# Patient Record
Sex: MALE | Race: BLACK OR AFRICAN AMERICAN | NOT HISPANIC OR LATINO | Employment: UNEMPLOYED | URBAN - METROPOLITAN AREA
[De-identification: names, ages, dates, MRNs, and addresses within clinical notes are randomized per-mention and may not be internally consistent; named-entity substitution may affect disease eponyms.]

---

## 2017-08-20 ENCOUNTER — HOSPITAL ENCOUNTER (EMERGENCY)
Facility: OTHER | Age: 82
Discharge: HOME | End: 2017-08-20
Attending: EMERGENCY MEDICINE
Payer: MEDICARE

## 2017-08-20 VITALS
OXYGEN SATURATION: 100 % | WEIGHT: 191 LBS | HEART RATE: 54 BPM | RESPIRATION RATE: 16 BRPM | TEMPERATURE: 99 F | HEIGHT: 67 IN | SYSTOLIC BLOOD PRESSURE: 157 MMHG | DIASTOLIC BLOOD PRESSURE: 70 MMHG | BODY MASS INDEX: 29.98 KG/M2

## 2017-08-20 DIAGNOSIS — I10 ESSENTIAL HYPERTENSION: ICD-10-CM

## 2017-08-20 DIAGNOSIS — K40.90 REDUCIBLE RIGHT INGUINAL HERNIA: Primary | ICD-10-CM

## 2017-08-20 PROCEDURE — 99282 EMERGENCY DEPT VISIT SF MDM: CPT

## 2017-08-20 RX ORDER — LISINOPRIL 20 MG/1
40 TABLET ORAL DAILY
Status: ON HOLD | COMMUNITY
End: 2017-09-02 | Stop reason: HOSPADM

## 2017-08-20 RX ORDER — CIPROFLOXACIN 500 MG/1
500 TABLET ORAL 2 TIMES DAILY
Status: ON HOLD | COMMUNITY
End: 2017-09-02

## 2017-08-20 RX ORDER — ERGOCALCIFEROL 1.25 MG/1
50000 CAPSULE ORAL
COMMUNITY

## 2017-08-20 NOTE — ED TRIAGE NOTES
"EMS reports pt has a history of hernia; pt reported to EMS and staff that he lifted up on a case of water Monday; pt reports today is the "first day" that he noticed some swelling and pain to right groin area; denies any dysuria/hematuria; denies any falls/injuries to area  "

## 2017-08-20 NOTE — ED NOTES
"Pt reports "I really don't have any pain right now after they gave me that shot." EMS gave 100 mcg of Fentanyl about 30 minutes PTA  "

## 2017-08-20 NOTE — ED NOTES
Pt waiting for family member to take home; pt resting comfortably with no s/s of distress; respirations regular, even and unlabored; call light within reach; continue to monitor for any changes

## 2017-08-20 NOTE — ED PROVIDER NOTES
"Encounter Date: 8/20/2017    SCRIBE #1 NOTE: I, Steve Ritchie, am scribing for, and in the presence of, Dr. El.       History     Chief Complaint   Patient presents with    Groin Pain     Right sided groin pain with swelling, and tenderness.     10:56 AM    Patient is an 83 year old male who presents to the ED with complaint of right sided groin pain. Patient has a known right groin hernia for four or five months. He reports that he stood up this morning and felt a sudden pain, stating "all at once felt something happen down there." Pain has subsided upon arrival to ED, and he is not currently in pain. He states he can "usually massage it away, but not this morning."      The history is provided by the patient.     Review of patient's allergies indicates:   Allergen Reactions    Penicillins Rash     Past Medical History:   Diagnosis Date    Anticoagulant long-term use     Coronary artery disease     GERD (gastroesophageal reflux disease)     Hypertension      Past Surgical History:   Procedure Laterality Date    HERNIA REPAIR      stents       History reviewed. No pertinent family history.  Social History   Substance Use Topics    Smoking status: Former Smoker     Quit date: 12/26/1995    Smokeless tobacco: Former User      Comment: pt reports stopped smoking 30 years ago    Alcohol use No     Review of Systems   Constitutional: Negative for fever.   HENT: Negative for sore throat.    Respiratory: Negative for shortness of breath.    Cardiovascular: Negative for chest pain.   Gastrointestinal:        Positive for right groin hernia.   Genitourinary: Negative for dysuria.   Musculoskeletal: Negative for back pain.   Skin: Negative for rash.   Neurological: Negative for weakness.   Hematological: Does not bruise/bleed easily.       Physical Exam     Initial Vitals [08/20/17 1021]   BP Pulse Resp Temp SpO2   (!) 140/65 (!) 58 16 98.7 °F (37.1 °C) 99 %      MAP       90         Physical Exam    Nursing " note and vitals reviewed.  Constitutional: He appears well-developed and well-nourished. He is not diaphoretic. No distress.   HENT:   Head: Normocephalic and atraumatic.   Mouth/Throat: Oropharynx is clear and moist.   Eyes: Conjunctivae and EOM are normal. Pupils are equal, round, and reactive to light.   Conjunctiva are pink, clear, and intact.   Neck: Normal range of motion. Neck supple.   Cardiovascular: Normal rate, regular rhythm and normal heart sounds. Exam reveals no gallop and no friction rub.    No murmur heard.  Pulmonary/Chest: Breath sounds normal. No respiratory distress. He has no wheezes. He has no rhonchi. He has no rales.   Abdominal: Soft. He exhibits no distension.   Reducible right groin hernia. No tenderness.   Musculoskeletal: Normal range of motion.   Neurological: He is alert and oriented to person, place, and time. He has normal strength.   Skin: Skin is warm and dry. No abscess noted. No erythema. No pallor.   Psychiatric: He has a normal mood and affect. His behavior is normal. Judgment and thought content normal.         ED Course   Procedures  Labs Reviewed - No data to display                      Scribe Attestation:   Scribe #1: I performed the above scribed service and the documentation accurately describes the services I performed. I attest to the accuracy of the note.    Attending Attestation:           Physician Attestation for Scribe:  Physician Attestation Statement for Scribe #1: I, Dr. El, reviewed documentation, as scribed by Steve Ritchie in my presence, and it is both accurate and complete.         Attending ED Notes:   Urgent evaluation of 83-year-old male with past medical history of inguinal hernia presents to ED with chief complaint of right-sided groin pain and bulging.  Patient is afebrile, nontoxic-appearing with stable vital signs except for elevation in blood pressure.  Patient is found to have a easily reducible right inguinal hernia.  No clinical  evidence of incarceration or strangulation.  The patient is extensive the counseled on his diagnosis and treatment, discharged in good condition and directed follow-up with general surgery in the next 24-48 hours.          ED Course     Clinical Impression:     1. Reducible right inguinal hernia    2. Essential hypertension                                 Edward Neal MD  08/20/17 0798

## 2017-08-28 ENCOUNTER — HOSPITAL ENCOUNTER (INPATIENT)
Facility: HOSPITAL | Age: 82
LOS: 5 days | Discharge: HOME | DRG: 871 | End: 2017-09-02
Attending: EMERGENCY MEDICINE | Admitting: INTERNAL MEDICINE
Payer: MEDICARE

## 2017-08-28 DIAGNOSIS — K80.50 CHOLEDOCHOLITHIASIS: ICD-10-CM

## 2017-08-28 DIAGNOSIS — K85.10 ACUTE BILIARY PANCREATITIS, UNSPECIFIED COMPLICATION STATUS: Primary | ICD-10-CM

## 2017-08-28 DIAGNOSIS — K85.10 GALLSTONE PANCREATITIS: ICD-10-CM

## 2017-08-28 LAB
ALBUMIN SERPL BCP-MCNC: 2.4 G/DL
ALP SERPL-CCNC: 397 U/L
ALT SERPL W/O P-5'-P-CCNC: 173 U/L
ANION GAP SERPL CALC-SCNC: 10 MMOL/L
AST SERPL-CCNC: 231 U/L
BACTERIA #/AREA URNS HPF: NORMAL /HPF
BASOPHILS # BLD AUTO: 0.01 K/UL
BASOPHILS NFR BLD: 0.1 %
BILIRUB SERPL-MCNC: 2.7 MG/DL
BILIRUB UR QL STRIP: NEGATIVE
BUN SERPL-MCNC: 22 MG/DL
CALCIUM SERPL-MCNC: 9.1 MG/DL
CHLORIDE SERPL-SCNC: 108 MMOL/L
CLARITY UR: CLEAR
CO2 SERPL-SCNC: 20 MMOL/L
COLOR UR: YELLOW
CREAT SERPL-MCNC: 2 MG/DL
DIFFERENTIAL METHOD: ABNORMAL
EOSINOPHIL # BLD AUTO: 0 K/UL
EOSINOPHIL NFR BLD: 0.1 %
ERYTHROCYTE [DISTWIDTH] IN BLOOD BY AUTOMATED COUNT: 14.1 %
EST. GFR  (AFRICAN AMERICAN): 35 ML/MIN/1.73 M^2
EST. GFR  (NON AFRICAN AMERICAN): 30 ML/MIN/1.73 M^2
GLUCOSE SERPL-MCNC: 128 MG/DL
GLUCOSE UR QL STRIP: NEGATIVE
HCT VFR BLD AUTO: 37.8 %
HGB BLD-MCNC: 12.9 G/DL
HGB UR QL STRIP: ABNORMAL
INR PPP: 1.1
KETONES UR QL STRIP: NEGATIVE
LACTATE SERPL-SCNC: 1.2 MMOL/L
LDH SERPL L TO P-CCNC: 416 U/L
LEUKOCYTE ESTERASE UR QL STRIP: NEGATIVE
LIPASE SERPL-CCNC: >1000 U/L
LYMPHOCYTES # BLD AUTO: 0.5 K/UL
LYMPHOCYTES NFR BLD: 2.6 %
MCH RBC QN AUTO: 30.9 PG
MCHC RBC AUTO-ENTMCNC: 34.1 G/DL
MCV RBC AUTO: 91 FL
MICROSCOPIC COMMENT: NORMAL
MONOCYTES # BLD AUTO: 0.7 K/UL
MONOCYTES NFR BLD: 4 %
NEUTROPHILS # BLD AUTO: 16.9 K/UL
NEUTROPHILS NFR BLD: 93.2 %
NITRITE UR QL STRIP: NEGATIVE
PH UR STRIP: 5 [PH] (ref 5–8)
PLATELET # BLD AUTO: 425 K/UL
PMV BLD AUTO: 10.7 FL
POTASSIUM SERPL-SCNC: 4.4 MMOL/L
PROT SERPL-MCNC: 6.3 G/DL
PROT UR QL STRIP: NEGATIVE
PROTHROMBIN TIME: 11.4 SEC
RBC # BLD AUTO: 4.17 M/UL
RBC #/AREA URNS HPF: 2 /HPF (ref 0–4)
SODIUM SERPL-SCNC: 138 MMOL/L
SP GR UR STRIP: 1.01 (ref 1–1.03)
SQUAMOUS #/AREA URNS HPF: 2 /HPF
URN SPEC COLLECT METH UR: ABNORMAL
UROBILINOGEN UR STRIP-ACNC: ABNORMAL EU/DL
WBC # BLD AUTO: 18.18 K/UL
WBC #/AREA URNS HPF: 1 /HPF (ref 0–5)

## 2017-08-28 PROCEDURE — 83690 ASSAY OF LIPASE: CPT

## 2017-08-28 PROCEDURE — 83615 LACTATE (LD) (LDH) ENZYME: CPT

## 2017-08-28 PROCEDURE — 99285 EMERGENCY DEPT VISIT HI MDM: CPT | Mod: 25

## 2017-08-28 PROCEDURE — 81000 URINALYSIS NONAUTO W/SCOPE: CPT

## 2017-08-28 PROCEDURE — 63600175 PHARM REV CODE 636 W HCPCS: Performed by: EMERGENCY MEDICINE

## 2017-08-28 PROCEDURE — 96367 TX/PROPH/DG ADDL SEQ IV INF: CPT

## 2017-08-28 PROCEDURE — 87040 BLOOD CULTURE FOR BACTERIA: CPT | Mod: 59

## 2017-08-28 PROCEDURE — 87086 URINE CULTURE/COLONY COUNT: CPT

## 2017-08-28 PROCEDURE — 96365 THER/PROPH/DIAG IV INF INIT: CPT

## 2017-08-28 PROCEDURE — 25000003 PHARM REV CODE 250: Performed by: EMERGENCY MEDICINE

## 2017-08-28 PROCEDURE — 87186 SC STD MICRODIL/AGAR DIL: CPT

## 2017-08-28 PROCEDURE — 96366 THER/PROPH/DIAG IV INF ADDON: CPT

## 2017-08-28 PROCEDURE — 85610 PROTHROMBIN TIME: CPT

## 2017-08-28 PROCEDURE — 86900 BLOOD TYPING SEROLOGIC ABO: CPT

## 2017-08-28 PROCEDURE — 86850 RBC ANTIBODY SCREEN: CPT

## 2017-08-28 PROCEDURE — 80053 COMPREHEN METABOLIC PANEL: CPT

## 2017-08-28 PROCEDURE — 83605 ASSAY OF LACTIC ACID: CPT

## 2017-08-28 PROCEDURE — 12000002 HC ACUTE/MED SURGE SEMI-PRIVATE ROOM

## 2017-08-28 PROCEDURE — 87077 CULTURE AEROBIC IDENTIFY: CPT

## 2017-08-28 PROCEDURE — 96375 TX/PRO/DX INJ NEW DRUG ADDON: CPT

## 2017-08-28 PROCEDURE — 85025 COMPLETE CBC W/AUTO DIFF WBC: CPT

## 2017-08-28 RX ORDER — ONDANSETRON 2 MG/ML
4 INJECTION INTRAMUSCULAR; INTRAVENOUS EVERY 12 HOURS PRN
Status: DISCONTINUED | OUTPATIENT
Start: 2017-08-28 | End: 2017-09-02 | Stop reason: HOSPADM

## 2017-08-28 RX ORDER — METRONIDAZOLE 500 MG/100ML
500 INJECTION, SOLUTION INTRAVENOUS
Status: DISCONTINUED | OUTPATIENT
Start: 2017-08-28 | End: 2017-08-29

## 2017-08-28 RX ORDER — MORPHINE SULFATE 10 MG/ML
6 INJECTION INTRAMUSCULAR; INTRAVENOUS; SUBCUTANEOUS
Status: COMPLETED | OUTPATIENT
Start: 2017-08-28 | End: 2017-08-28

## 2017-08-28 RX ORDER — CIPROFLOXACIN 2 MG/ML
400 INJECTION, SOLUTION INTRAVENOUS
Status: DISCONTINUED | OUTPATIENT
Start: 2017-08-29 | End: 2017-08-29

## 2017-08-28 RX ORDER — ONDANSETRON 2 MG/ML
4 INJECTION INTRAMUSCULAR; INTRAVENOUS
Status: COMPLETED | OUTPATIENT
Start: 2017-08-28 | End: 2017-08-28

## 2017-08-28 RX ORDER — CIPROFLOXACIN 2 MG/ML
400 INJECTION, SOLUTION INTRAVENOUS
Status: COMPLETED | OUTPATIENT
Start: 2017-08-28 | End: 2017-08-28

## 2017-08-28 RX ORDER — HYDROMORPHONE HYDROCHLORIDE 2 MG/ML
1 INJECTION, SOLUTION INTRAMUSCULAR; INTRAVENOUS; SUBCUTANEOUS
Status: COMPLETED | OUTPATIENT
Start: 2017-08-28 | End: 2017-08-28

## 2017-08-28 RX ORDER — FAMOTIDINE 10 MG/ML
20 INJECTION INTRAVENOUS DAILY
Status: DISCONTINUED | OUTPATIENT
Start: 2017-08-29 | End: 2017-09-02 | Stop reason: HOSPADM

## 2017-08-28 RX ORDER — SODIUM CHLORIDE 9 MG/ML
1000 INJECTION, SOLUTION INTRAVENOUS
Status: COMPLETED | OUTPATIENT
Start: 2017-08-28 | End: 2017-08-28

## 2017-08-28 RX ORDER — HYDROMORPHONE HYDROCHLORIDE 2 MG/ML
1 INJECTION, SOLUTION INTRAMUSCULAR; INTRAVENOUS; SUBCUTANEOUS EVERY 4 HOURS PRN
Status: DISCONTINUED | OUTPATIENT
Start: 2017-08-28 | End: 2017-09-02 | Stop reason: HOSPADM

## 2017-08-28 RX ORDER — MEROPENEM AND SODIUM CHLORIDE 500 MG/50ML
500 INJECTION, SOLUTION INTRAVENOUS
Status: DISCONTINUED | OUTPATIENT
Start: 2017-08-29 | End: 2017-08-29

## 2017-08-28 RX ORDER — SODIUM CHLORIDE 9 MG/ML
INJECTION, SOLUTION INTRAVENOUS CONTINUOUS
Status: DISCONTINUED | OUTPATIENT
Start: 2017-08-29 | End: 2017-09-02 | Stop reason: HOSPADM

## 2017-08-28 RX ORDER — MORPHINE SULFATE 10 MG/ML
2 INJECTION INTRAMUSCULAR; INTRAVENOUS; SUBCUTANEOUS EVERY 4 HOURS PRN
Status: DISCONTINUED | OUTPATIENT
Start: 2017-08-28 | End: 2017-09-02 | Stop reason: HOSPADM

## 2017-08-28 RX ORDER — ACETAMINOPHEN 325 MG/1
650 TABLET ORAL EVERY 8 HOURS PRN
Status: DISCONTINUED | OUTPATIENT
Start: 2017-08-28 | End: 2017-09-02 | Stop reason: HOSPADM

## 2017-08-28 RX ORDER — ACETAMINOPHEN 500 MG
1000 TABLET ORAL
Status: COMPLETED | OUTPATIENT
Start: 2017-08-28 | End: 2017-08-28

## 2017-08-28 RX ADMIN — MORPHINE SULFATE 6 MG: 10 INJECTION INTRAVENOUS at 06:08

## 2017-08-28 RX ADMIN — HYDROMORPHONE HYDROCHLORIDE 1 MG: 2 INJECTION INTRAMUSCULAR; INTRAVENOUS; SUBCUTANEOUS at 08:08

## 2017-08-28 RX ADMIN — ACETAMINOPHEN 1000 MG: 500 TABLET ORAL at 08:08

## 2017-08-28 RX ADMIN — SODIUM CHLORIDE 1000 ML: 0.9 INJECTION, SOLUTION INTRAVENOUS at 10:08

## 2017-08-28 RX ADMIN — ONDANSETRON 4 MG: 2 INJECTION INTRAMUSCULAR; INTRAVENOUS at 06:08

## 2017-08-28 RX ADMIN — CIPROFLOXACIN 400 MG: 2 INJECTION, SOLUTION INTRAVENOUS at 10:08

## 2017-08-28 RX ADMIN — SODIUM CHLORIDE 1000 ML: 0.9 INJECTION, SOLUTION INTRAVENOUS at 06:08

## 2017-08-28 NOTE — ED PROVIDER NOTES
Encounter Date: 8/28/2017    SCRIBE #1 NOTE: I, Smiley Le, am scribing for, and in the presence of,  Heath Byrnes MD. I have scribed the following portions of the note - Other sections scribed: HPI and ROS.       History     Chief Complaint   Patient presents with    Abdominal Pain     Pt reports increasing abdominal pain today and dificulty urinating. Pt also scheduled for hernia repair today but could not make it to surgery. Temp on arrival 103.3     CC: Abdominal Pain    HPI: The pt is a 83 y.o. M with a PMHx of anticoagulant long-term use, hernia, coronary artery disease, GERD, and HTN and a PSHx of leg stents and hernia repair who presents to the ED c/o constant and exacerbating periumbilical abdominal pain that started yesterday. Pt states that he was schedule for a hernia repair surgery today but could not make it. Pt rates pain as severe (10/10), states that it radiates from periumbilical region to sides and around back, and says that pain exacerbates with movement,. Pt also reports associated nausea and emesis. Pt says that when he vomits, nothing comes up. Pt reports constipation and diarrhea that started 4 days ago as well as dysuria. Pt denies EtOH consumption. No attempted treatments. No alleviating factors. Pt otherwise denies fever, chest pain, penile swelling, and other associated symptoms.         The history is provided by the patient. No  was used.     Review of patient's allergies indicates:   Allergen Reactions    Penicillins Rash     Past Medical History:   Diagnosis Date    Anticoagulant long-term use     Coronary artery disease     GERD (gastroesophageal reflux disease)     Hypertension      Past Surgical History:   Procedure Laterality Date    HERNIA REPAIR      stents       History reviewed. No pertinent family history.  Social History   Substance Use Topics    Smoking status: Former Smoker     Quit date: 12/26/1995    Smokeless tobacco: Former User       Comment: pt reports stopped smoking 30 years ago    Alcohol use No     Review of Systems   Constitutional: Negative for chills, diaphoresis and fever.   HENT: Negative for ear pain and sore throat.    Eyes: Negative for redness.   Respiratory: Negative for cough and shortness of breath.    Cardiovascular: Negative for chest pain.   Gastrointestinal: Positive for abdominal pain (perimubilical radiating to sides and back), constipation, diarrhea, nausea and vomiting.   Genitourinary: Positive for dysuria. Negative for penile swelling.   Musculoskeletal: Negative for back pain.   Skin: Negative for rash.   Neurological: Negative for headaches.       Physical Exam     Initial Vitals [08/28/17 1755]   BP Pulse Resp Temp SpO2   (!) 140/73 70 18 (!) 103.3 °F (39.6 °C) 97 %      MAP       95.33         Physical Exam    Nursing note and vitals reviewed.  Constitutional: He appears well-developed and well-nourished. He appears distressed.   HENT:   Head: Atraumatic.   Eyes: EOM are normal. Pupils are equal, round, and reactive to light.   Neck: Normal range of motion. Neck supple. No JVD present.   Cardiovascular: Normal rate, regular rhythm, normal heart sounds and intact distal pulses. Exam reveals no gallop and no friction rub.    No murmur heard.  Pulmonary/Chest: Breath sounds normal. No respiratory distress. He has no wheezes. He has no rhonchi. He has no rales.   Abdominal: Soft. Bowel sounds are normal. There is tenderness.   Epigastric right upper quadrant and left upper quadrant tenderness.  However patient states all his pain is in the lower abdomen   Musculoskeletal: Normal range of motion.   Lymphadenopathy:     He has no cervical adenopathy.   Neurological: He is alert and oriented to person, place, and time. He has normal strength.   Skin: Skin is warm and dry.   Psychiatric: He has a normal mood and affect. Thought content normal.         ED Course   Procedures  Labs Reviewed   CBC W/ AUTO DIFFERENTIAL -  Abnormal; Notable for the following:        Result Value    WBC 18.18 (*)     RBC 4.17 (*)     Hemoglobin 12.9 (*)     Hematocrit 37.8 (*)     Platelets 425 (*)     Gran # 16.9 (*)     Lymph # 0.5 (*)     Gran% 93.2 (*)     Lymph% 2.6 (*)     All other components within normal limits   COMPREHENSIVE METABOLIC PANEL - Abnormal; Notable for the following:     CO2 20 (*)     Glucose 128 (*)     Creatinine 2.0 (*)     Albumin 2.4 (*)     Total Bilirubin 2.7 (*)     Alkaline Phosphatase 397 (*)      (*)      (*)     eGFR if  35 (*)     eGFR if non  30 (*)     All other components within normal limits    Narrative:     Recoll. [id number] by KTN at 08/28/2017 19:39, reason: SPECIMEN   GROSSLY HEMOLYZED. NOTIFIED ELISSA   LIPASE - Abnormal; Notable for the following:     Lipase >1000 (*)     All other components within normal limits    Narrative:     Recoll. [id number] by KTN at 08/28/2017 19:39, reason: SPECIMEN   GROSSLY HEMOLYZED. NOTIFIED ELISSA   URINALYSIS - Abnormal; Notable for the following:     Occult Blood UA 1+ (*)     Urobilinogen, UA 4.0-6.0 (*)     All other components within normal limits   LACTATE DEHYDROGENASE - Abnormal; Notable for the following:      (*)     All other components within normal limits   CULTURE, URINE   LACTIC ACID, PLASMA   URINALYSIS MICROSCOPIC   PROTIME-INR   TYPE & SCREEN        Lab work consistent with choledocholithiasis and gallstone pancreatitis  Patient also had fever and leukocytosis concerning for cholecystitis or ascending cholangitis.  Imaging confirms these diagnoses.  I discussed the case with Dr. Lund.    We discussed patient's age and comorbidities and potential for worsening clinical outcome. Dr. Lund instructs me to place the patient on MedSurg.  He also requests Invanz as antibiotic.  Will place consult for gastroenterology for ERCP.                Scribe Attestation:   Scribe #1: I performed the above scribed  service and the documentation accurately describes the services I performed. I attest to the accuracy of the note.    Attending Attestation:           Physician Attestation for Scribe:  Physician Attestation Statement for Scribe #1: I, Heath Byrnes MD, reviewed documentation, as scribed by Smiley Hancock in my presence, and it is both accurate and complete.                 ED Course     Clinical Impression:   The primary encounter diagnosis was Choledocholithiasis. A diagnosis of Gallstone pancreatitis was also pertinent to this visit.                           Heath Byrnes MD  08/29/17 0336

## 2017-08-28 NOTE — ED TRIAGE NOTES
"Pt presents to the emergency department complaining of lower abdominal pain that has been off and on for two weeks. Associated with nausea. States he has had no vomiting or diarrhea, and that "nothing will come up". Doesn't know when his last bowel movement was. Denies chest pain or headache.   "

## 2017-08-29 ENCOUNTER — ANESTHESIA (OUTPATIENT)
Dept: ENDOSCOPY | Facility: HOSPITAL | Age: 82
DRG: 871 | End: 2017-08-29
Payer: MEDICARE

## 2017-08-29 ENCOUNTER — ANESTHESIA EVENT (OUTPATIENT)
Dept: ENDOSCOPY | Facility: HOSPITAL | Age: 82
DRG: 871 | End: 2017-08-29
Payer: MEDICARE

## 2017-08-29 PROBLEM — R65.20 SEVERE SEPSIS: Status: ACTIVE | Noted: 2017-08-29

## 2017-08-29 PROBLEM — A41.9 SEVERE SEPSIS: Status: ACTIVE | Noted: 2017-08-29

## 2017-08-29 PROBLEM — I25.10 CAD (CORONARY ARTERY DISEASE): Status: ACTIVE | Noted: 2017-08-29

## 2017-08-29 PROBLEM — R78.81 GRAM-NEGATIVE BACTEREMIA: Status: ACTIVE | Noted: 2017-08-29

## 2017-08-29 PROBLEM — K85.90 ACUTE PANCREATITIS: Status: ACTIVE | Noted: 2017-08-29

## 2017-08-29 PROBLEM — I25.10 CAD (CORONARY ARTERY DISEASE): Chronic | Status: ACTIVE | Noted: 2017-08-29

## 2017-08-29 PROBLEM — I73.9 PAD (PERIPHERAL ARTERY DISEASE): Chronic | Status: ACTIVE | Noted: 2017-08-29

## 2017-08-29 PROBLEM — N17.9 ACUTE RENAL FAILURE: Status: ACTIVE | Noted: 2017-08-29

## 2017-08-29 PROBLEM — I10 ESSENTIAL HYPERTENSION: Chronic | Status: ACTIVE | Noted: 2017-08-29

## 2017-08-29 PROBLEM — K40.90 UNILATERAL INGUINAL HERNIA: Chronic | Status: ACTIVE | Noted: 2017-08-29

## 2017-08-29 LAB
ABO + RH BLD: NORMAL
ALBUMIN SERPL BCP-MCNC: 2.4 G/DL
ALP SERPL-CCNC: 389 U/L
ALT SERPL W/O P-5'-P-CCNC: 241 U/L
ANION GAP SERPL CALC-SCNC: 10 MMOL/L
AST SERPL-CCNC: 285 U/L
BASOPHILS # BLD AUTO: 0.01 K/UL
BASOPHILS NFR BLD: 0 %
BILIRUB SERPL-MCNC: 3.7 MG/DL
BLD GP AB SCN CELLS X3 SERPL QL: NORMAL
BUN SERPL-MCNC: 22 MG/DL
CALCIUM SERPL-MCNC: 9.7 MG/DL
CHLORIDE SERPL-SCNC: 107 MMOL/L
CO2 SERPL-SCNC: 22 MMOL/L
CREAT SERPL-MCNC: 2 MG/DL
DIFFERENTIAL METHOD: ABNORMAL
EOSINOPHIL # BLD AUTO: 0 K/UL
EOSINOPHIL NFR BLD: 0 %
ERYTHROCYTE [DISTWIDTH] IN BLOOD BY AUTOMATED COUNT: 14.2 %
EST. GFR  (AFRICAN AMERICAN): 35 ML/MIN/1.73 M^2
EST. GFR  (NON AFRICAN AMERICAN): 30 ML/MIN/1.73 M^2
GLUCOSE SERPL-MCNC: 99 MG/DL
HCT VFR BLD AUTO: 35.2 %
HGB BLD-MCNC: 11.8 G/DL
LYMPHOCYTES # BLD AUTO: 1.2 K/UL
LYMPHOCYTES NFR BLD: 5.3 %
MCH RBC QN AUTO: 30.6 PG
MCHC RBC AUTO-ENTMCNC: 33.5 G/DL
MCV RBC AUTO: 91 FL
MONOCYTES # BLD AUTO: 1.2 K/UL
MONOCYTES NFR BLD: 5.3 %
NEUTROPHILS # BLD AUTO: 19.7 K/UL
NEUTROPHILS NFR BLD: 88.9 %
PLATELET # BLD AUTO: 390 K/UL
PMV BLD AUTO: 10.8 FL
POTASSIUM SERPL-SCNC: 4.8 MMOL/L
PROT SERPL-MCNC: 6.4 G/DL
RBC # BLD AUTO: 3.85 M/UL
SODIUM SERPL-SCNC: 139 MMOL/L
WBC # BLD AUTO: 22.18 K/UL

## 2017-08-29 PROCEDURE — 43274 ERCP DUCT STENT PLACEMENT: CPT | Performed by: INTERNAL MEDICINE

## 2017-08-29 PROCEDURE — 25000242 PHARM REV CODE 250 ALT 637 W/ HCPCS: Performed by: ANESTHESIOLOGY

## 2017-08-29 PROCEDURE — 25000003 PHARM REV CODE 250: Performed by: ANESTHESIOLOGY

## 2017-08-29 PROCEDURE — 25000003 PHARM REV CODE 250: Performed by: INTERNAL MEDICINE

## 2017-08-29 PROCEDURE — 12000002 HC ACUTE/MED SURGE SEMI-PRIVATE ROOM

## 2017-08-29 PROCEDURE — 63600175 PHARM REV CODE 636 W HCPCS: Performed by: NURSE ANESTHETIST, CERTIFIED REGISTERED

## 2017-08-29 PROCEDURE — 37000008 HC ANESTHESIA 1ST 15 MINUTES: Performed by: INTERNAL MEDICINE

## 2017-08-29 PROCEDURE — 27000221 HC OXYGEN, UP TO 24 HOURS

## 2017-08-29 PROCEDURE — S0030 INJECTION, METRONIDAZOLE: HCPCS | Performed by: EMERGENCY MEDICINE

## 2017-08-29 PROCEDURE — 94660 CPAP INITIATION&MGMT: CPT

## 2017-08-29 PROCEDURE — D9220A PRA ANESTHESIA: Mod: CRNA,,, | Performed by: NURSE ANESTHETIST, CERTIFIED REGISTERED

## 2017-08-29 PROCEDURE — 63600175 PHARM REV CODE 636 W HCPCS: Performed by: INTERNAL MEDICINE

## 2017-08-29 PROCEDURE — 99900035 HC TECH TIME PER 15 MIN (STAT)

## 2017-08-29 PROCEDURE — 37000009 HC ANESTHESIA EA ADD 15 MINS: Performed by: INTERNAL MEDICINE

## 2017-08-29 PROCEDURE — D9220A PRA ANESTHESIA: Mod: ANES,,, | Performed by: ANESTHESIOLOGY

## 2017-08-29 PROCEDURE — 27800788 HC STENT, BILIARY T.T.S. 4-8 NEW: Performed by: INTERNAL MEDICINE

## 2017-08-29 PROCEDURE — 36415 COLL VENOUS BLD VENIPUNCTURE: CPT

## 2017-08-29 PROCEDURE — 80053 COMPREHEN METABOLIC PANEL: CPT

## 2017-08-29 PROCEDURE — 63600175 PHARM REV CODE 636 W HCPCS: Performed by: EMERGENCY MEDICINE

## 2017-08-29 PROCEDURE — 25000003 PHARM REV CODE 250: Performed by: NURSE ANESTHETIST, CERTIFIED REGISTERED

## 2017-08-29 PROCEDURE — 27200949 HC CANNULATOME: Performed by: INTERNAL MEDICINE

## 2017-08-29 PROCEDURE — 25000003 PHARM REV CODE 250: Performed by: EMERGENCY MEDICINE

## 2017-08-29 PROCEDURE — S0028 INJECTION, FAMOTIDINE, 20 MG: HCPCS | Performed by: EMERGENCY MEDICINE

## 2017-08-29 PROCEDURE — 85025 COMPLETE CBC W/AUTO DIFF WBC: CPT

## 2017-08-29 PROCEDURE — 94761 N-INVAS EAR/PLS OXIMETRY MLT: CPT

## 2017-08-29 PROCEDURE — 94640 AIRWAY INHALATION TREATMENT: CPT

## 2017-08-29 PROCEDURE — 0F798DZ DILATION OF COMMON BILE DUCT WITH INTRALUMINAL DEVICE, VIA NATURAL OR ARTIFICIAL OPENING ENDOSCOPIC: ICD-10-PCS | Performed by: INTERNAL MEDICINE

## 2017-08-29 PROCEDURE — 27000190 HC CPAP FULL FACE MASK W/VALVE

## 2017-08-29 RX ORDER — NEOSTIGMINE METHYLSULFATE 1 MG/ML
INJECTION, SOLUTION INTRAVENOUS
Status: DISCONTINUED | OUTPATIENT
Start: 2017-08-29 | End: 2017-08-29

## 2017-08-29 RX ORDER — LABETALOL HYDROCHLORIDE 5 MG/ML
INJECTION, SOLUTION INTRAVENOUS
Status: DISPENSED
Start: 2017-08-29 | End: 2017-08-30

## 2017-08-29 RX ORDER — LIDOCAINE HCL/PF 100 MG/5ML
SYRINGE (ML) INTRAVENOUS
Status: DISCONTINUED | OUTPATIENT
Start: 2017-08-29 | End: 2017-08-29

## 2017-08-29 RX ORDER — HYDRALAZINE HYDROCHLORIDE 20 MG/ML
INJECTION INTRAMUSCULAR; INTRAVENOUS
Status: DISPENSED
Start: 2017-08-29 | End: 2017-08-30

## 2017-08-29 RX ORDER — INDOMETHACIN 50 MG/1
100 SUPPOSITORY RECTAL ONCE
Status: DISCONTINUED | OUTPATIENT
Start: 2017-08-29 | End: 2017-08-29

## 2017-08-29 RX ORDER — SODIUM CHLORIDE, SODIUM LACTATE, POTASSIUM CHLORIDE, CALCIUM CHLORIDE 600; 310; 30; 20 MG/100ML; MG/100ML; MG/100ML; MG/100ML
INJECTION, SOLUTION INTRAVENOUS CONTINUOUS
Status: DISCONTINUED | OUTPATIENT
Start: 2017-08-29 | End: 2017-08-29

## 2017-08-29 RX ORDER — ROCURONIUM BROMIDE 10 MG/ML
INJECTION, SOLUTION INTRAVENOUS
Status: DISCONTINUED | OUTPATIENT
Start: 2017-08-29 | End: 2017-08-29

## 2017-08-29 RX ORDER — INDOMETHACIN 50 MG/1
SUPPOSITORY RECTAL
Status: DISCONTINUED | OUTPATIENT
Start: 2017-08-29 | End: 2017-08-29

## 2017-08-29 RX ORDER — LABETALOL HYDROCHLORIDE 5 MG/ML
INJECTION, SOLUTION INTRAVENOUS
Status: DISCONTINUED | OUTPATIENT
Start: 2017-08-29 | End: 2017-08-29

## 2017-08-29 RX ORDER — GLUCAGON 1 MG
KIT INJECTION
Status: DISCONTINUED
Start: 2017-08-29 | End: 2017-08-29 | Stop reason: WASHOUT

## 2017-08-29 RX ORDER — PROPOFOL 10 MG/ML
VIAL (ML) INTRAVENOUS
Status: DISCONTINUED | OUTPATIENT
Start: 2017-08-29 | End: 2017-08-29

## 2017-08-29 RX ORDER — FENTANYL CITRATE 50 UG/ML
INJECTION, SOLUTION INTRAMUSCULAR; INTRAVENOUS
Status: DISCONTINUED | OUTPATIENT
Start: 2017-08-29 | End: 2017-08-29

## 2017-08-29 RX ORDER — METOPROLOL TARTRATE 1 MG/ML
INJECTION, SOLUTION INTRAVENOUS
Status: DISCONTINUED | OUTPATIENT
Start: 2017-08-29 | End: 2017-08-29

## 2017-08-29 RX ORDER — HYDRALAZINE HYDROCHLORIDE 20 MG/ML
10 INJECTION INTRAMUSCULAR; INTRAVENOUS EVERY 4 HOURS PRN
Status: DISCONTINUED | OUTPATIENT
Start: 2017-08-29 | End: 2017-09-01

## 2017-08-29 RX ORDER — GLYCOPYRROLATE 0.2 MG/ML
INJECTION INTRAMUSCULAR; INTRAVENOUS
Status: DISCONTINUED | OUTPATIENT
Start: 2017-08-29 | End: 2017-08-29

## 2017-08-29 RX ORDER — INDOMETHACIN 50 MG/1
100 SUPPOSITORY RECTAL ONCE
Status: DISCONTINUED | OUTPATIENT
Start: 2017-08-29 | End: 2017-08-29 | Stop reason: SDUPTHER

## 2017-08-29 RX ORDER — WATER FOR INJECTION,STERILE
VIAL (ML) INJECTION
Status: DISCONTINUED
Start: 2017-08-29 | End: 2017-08-29 | Stop reason: WASHOUT

## 2017-08-29 RX ADMIN — FAMOTIDINE 20 MG: 10 INJECTION, SOLUTION INTRAVENOUS at 08:08

## 2017-08-29 RX ADMIN — NEOSTIGMINE METHYLSULFATE 5 MG: 1 INJECTION INTRAVENOUS at 04:08

## 2017-08-29 RX ADMIN — SODIUM CHLORIDE, SODIUM LACTATE, POTASSIUM CHLORIDE, AND CALCIUM CHLORIDE: .6; .31; .03; .02 INJECTION, SOLUTION INTRAVENOUS at 04:08

## 2017-08-29 RX ADMIN — SODIUM CHLORIDE: 0.9 INJECTION, SOLUTION INTRAVENOUS at 05:08

## 2017-08-29 RX ADMIN — PROPOFOL 130 MG: 10 INJECTION, EMULSION INTRAVENOUS at 03:08

## 2017-08-29 RX ADMIN — LIDOCAINE HYDROCHLORIDE 100 MG: 20 INJECTION, SOLUTION INTRAVENOUS at 03:08

## 2017-08-29 RX ADMIN — GLYCOPYRROLATE 0.6 MG: 0.2 INJECTION, SOLUTION INTRAMUSCULAR; INTRAVENOUS at 04:08

## 2017-08-29 RX ADMIN — SODIUM CHLORIDE: 0.9 INJECTION, SOLUTION INTRAVENOUS at 12:08

## 2017-08-29 RX ADMIN — METOPROLOL TARTRATE 1 MG: 5 INJECTION, SOLUTION INTRAVENOUS at 03:08

## 2017-08-29 RX ADMIN — SODIUM CHLORIDE: 0.9 INJECTION, SOLUTION INTRAVENOUS at 11:08

## 2017-08-29 RX ADMIN — ROCURONIUM BROMIDE 50 MG: 10 INJECTION, SOLUTION INTRAVENOUS at 03:08

## 2017-08-29 RX ADMIN — RACEPINEPHRINE HYDROCHLORIDE 0.5 ML: 11.25 SOLUTION RESPIRATORY (INHALATION) at 05:08

## 2017-08-29 RX ADMIN — METOPROLOL TARTRATE 2 MG: 5 INJECTION, SOLUTION INTRAVENOUS at 04:08

## 2017-08-29 RX ADMIN — HYDRALAZINE HYDROCHLORIDE 10 MG: 20 INJECTION INTRAMUSCULAR; INTRAVENOUS at 07:08

## 2017-08-29 RX ADMIN — PROPOFOL 20 MG: 10 INJECTION, EMULSION INTRAVENOUS at 04:08

## 2017-08-29 RX ADMIN — FENTANYL CITRATE 100 MCG: 50 INJECTION INTRAMUSCULAR; INTRAVENOUS at 03:08

## 2017-08-29 RX ADMIN — SODIUM CHLORIDE: 0.9 INJECTION, SOLUTION INTRAVENOUS at 06:08

## 2017-08-29 RX ADMIN — METRONIDAZOLE 500 MG: 500 INJECTION, SOLUTION INTRAVENOUS at 12:08

## 2017-08-29 RX ADMIN — LABETALOL HYDROCHLORIDE 10 MG: 5 INJECTION, SOLUTION INTRAVENOUS at 04:08

## 2017-08-29 RX ADMIN — ERTAPENEM SODIUM 1 G: 1 INJECTION, POWDER, LYOPHILIZED, FOR SOLUTION INTRAMUSCULAR; INTRAVENOUS at 12:08

## 2017-08-29 RX ADMIN — METOPROLOL TARTRATE 1 MG: 5 INJECTION, SOLUTION INTRAVENOUS at 04:08

## 2017-08-29 RX ADMIN — INDOMETHACIN 100 MG: 50 SUPPOSITORY RECTAL at 03:08

## 2017-08-29 RX ADMIN — HYDRALAZINE HYDROCHLORIDE 5 MG: 20 INJECTION INTRAMUSCULAR; INTRAVENOUS at 04:08

## 2017-08-29 RX ADMIN — ONDANSETRON 4 MG: 2 INJECTION INTRAMUSCULAR; INTRAVENOUS at 03:08

## 2017-08-29 RX ADMIN — HYDRALAZINE HYDROCHLORIDE 10 MG: 20 INJECTION INTRAMUSCULAR; INTRAVENOUS at 05:08

## 2017-08-29 RX ADMIN — LABETALOL HYDROCHLORIDE 10 MG: 5 INJECTION, SOLUTION INTRAVENOUS at 05:08

## 2017-08-29 RX ADMIN — SODIUM CHLORIDE, SODIUM LACTATE, POTASSIUM CHLORIDE, AND CALCIUM CHLORIDE: .6; .31; .03; .02 INJECTION, SOLUTION INTRAVENOUS at 02:08

## 2017-08-29 NOTE — PLAN OF CARE
08/29/17 0915   Discharge Assessment   Assessment Type Discharge Planning Assessment   Confirmed/corrected address and phone number on facesheet? Yes   Assessment information obtained from? Patient   Prior to hospitilization cognitive status: Alert/Oriented   Prior to hospitalization functional status: Independent   Current cognitive status: Alert/Oriented   Current Functional Status: Independent   Lives With alone   Able to Return to Prior Arrangements yes   Is patient able to care for self after discharge? Yes   Who are your caregiver(s) and their phone number(s)? Johanna pt daughter    Patient's perception of discharge disposition admitted as an inpatient   Readmission Within The Last 30 Days no previous admission in last 30 days   Patient currently being followed by outpatient case management? No   Patient currently receives any other outside agency services? No   Equipment Currently Used at Home none   Do you have any problems affording any of your prescribed medications? No   Is the patient taking medications as prescribed? yes   Does the patient have transportation home? No   Does the patient receive services at the [facility]? No   Discharge Plan A Home Health;Home with family   Discharge Plan B Home with family;Home Health   Patient/Family In Agreement With Plan yes   Does the patient have transportation to healthcare appointments? Yes     SW to patient's room to discuss Helping the patient manage care at home.  SW roll explained to pt.  Teach back method used.  TN's name and contact info placed on white board.  Pt/family encouraged to call for any problems/concerns with DC.

## 2017-08-29 NOTE — TRANSFER OF CARE
"Anesthesia Transfer of Care Note    Patient: Mandeep Guy    Procedure(s) Performed: Procedure(s) (LRB):  ERCP (N/A)    Patient location: GI    Anesthesia Type: general    Transport from OR: Transported from OR on 100% O2 by closed face mask with adequate spontaneous ventilation    Post pain: adequate analgesia    Post assessment: no apparent anesthetic complications and tolerated procedure well    Post vital signs: stable    Level of consciousness: awake and alert    Nausea/Vomiting: no nausea/vomiting    Complications: respiratory complications, other (see comments), HTN resolved with treatment    Transfer of care protocol was followed      Last vitals:   Visit Vitals  BP (!) 203/92 (BP Location: Left arm, Patient Position: Lying)   Pulse 89   Temp 36.6 °C (97.8 °F) (Oral)   Resp (!) 22   Ht 5' 8" (1.727 m)   Wt 81.2 kg (179 lb)   SpO2 99%   BMI 27.22 kg/m²     "

## 2017-08-29 NOTE — OR NURSING
ERCP AND RECOVERY PROCESS DISCUSSED WITH VENICE BROOKS. ALL QUESTION ADDRESSED. LR 2ND LITER TO RUN UNTIL COMPLETE.     PRN BIPAP PER DR. MELARA.    MONITOR B/P CLOSELY WITH FLUIDS PER DR. MELARA AND NOTIFY DR. ACKERMAN IF B/P BECOMES UNSTABLE.

## 2017-08-29 NOTE — OR NURSING
BREATHING TREATMENT IN PROGRESS; RESPIRATORY THERAPIST AT BEDSIDE WITH DR. MELARA AND ERENDIRA VERA CRNA.

## 2017-08-29 NOTE — NURSING
Notified Dr. Bates of pt's elevated SBP 170s-190s. Orders for PRN med given. Dr. Bates states she spoke with surgeon and there was no need for us to call.

## 2017-08-29 NOTE — ANESTHESIA PREPROCEDURE EVALUATION
08/29/2017  Mandeep Guy is a 83 y.o., male.    Anesthesia Evaluation    I have reviewed the Patient Summary Reports.    I have reviewed the Nursing Notes.   I have reviewed the Medications.     Review of Systems  Anesthesia Hx:  No problems with previous Anesthesia Denies Hx of Anesthetic complications  Neg history of prior surgery. Denies Family Hx of Anesthesia complications.   Denies Personal Hx of Anesthesia complications.   Social:  Non-Smoker, No Alcohol Use    Hematology/Oncology:  Hematology Normal   Oncology Normal     EENT/Dental:EENT/Dental Normal   Cardiovascular:   Exercise tolerance: good Hypertension CAD      Pulmonary:  Pulmonary Normal    Renal/:   Chronic Renal Disease    Hepatic/GI:   GERD    Musculoskeletal:  Musculoskeletal Normal    Neurological:  Neurology Normal    Endocrine:  Endocrine Normal    Dermatological:  Skin Normal    Psych:  Psychiatric Normal           Physical Exam  General:  Well nourished    Airway/Jaw/Neck:  Airway Findings: Mouth Opening: Normal General Airway Assessment: Adult  Mallampati: II  TM Distance: Normal, at least 6 cm         Dental:  DENTAL FINDINGS: Normal   Chest/Lungs:  Chest/Lungs Clear    Heart/Vascular:  Heart Findings: Normal Heart murmur: negative       Mental Status:  Mental Status Findings:  Cooperative, Alert and Oriented         Anesthesia Plan  Type of Anesthesia, risks & benefits discussed:  Anesthesia Type:  spinal  Patient's Preference:   Intra-op Monitoring Plan:   Intra-op Monitoring Plan Comments:   Post Op Pain Control Plan:   Post Op Pain Control Plan Comments:   Induction:   IV  Beta Blocker:  Patient is not currently on a Beta-Blocker (No further documentation required).       Informed Consent: Patient understands risks and agrees with Anesthesia plan.  Questions answered. Anesthesia consent signed with patient.  ASA Score: 3      Day of Surgery Review of History & Physical:            Ready For Surgery From Anesthesia Perspective.

## 2017-08-29 NOTE — SUBJECTIVE & OBJECTIVE
Past Medical History:   Diagnosis Date    Anticoagulant long-term use     Coronary artery disease     GERD (gastroesophageal reflux disease)     Hypertension     PAD (peripheral artery disease)     s/p stent       Past Surgical History:   Procedure Laterality Date    HERNIA REPAIR      stents         Review of patient's allergies indicates:   Allergen Reactions    Penicillins Rash       No current facility-administered medications on file prior to encounter.      Current Outpatient Prescriptions on File Prior to Encounter   Medication Sig    amlodipine (NORVASC) 10 MG tablet Take 10 mg by mouth once daily.    aspirin 81 MG Chew Take 81 mg by mouth once daily.    clopidogrel (PLAVIX) 75 mg tablet Take 75 mg by mouth once daily.    hydrochlorothiazide (HYDRODIURIL) 12.5 MG Tab Take 12.5 mg by mouth once daily.    lisinopril (PRINIVIL,ZESTRIL) 20 MG tablet Take 40 mg by mouth once daily.    pravastatin (PRAVACHOL) 40 MG tablet Take 40 mg by mouth once daily.    carvedilol (COREG) 6.25 MG tablet Take 1 tablet (6.25 mg total) by mouth 2 (two) times daily with meals.    ciprofloxacin HCl (CIPRO) 500 MG tablet Take 500 mg by mouth 2 (two) times daily.    ergocalciferol (ERGOCALCIFEROL) 50,000 unit Cap Take 50,000 Units by mouth every 7 days.    melatonin 3 mg TbDL Take by mouth.    nitroGLYCERIN (NITROSTAT) 0.4 MG SL tablet Place 1 tablet (0.4 mg total) under the tongue every 5 (five) minutes as needed for Chest pain (if more than three doses needed, seek urgent medical attention).    pantoprazole (PROTONIX) 40 MG tablet Take 40 mg by mouth once daily.    polyethylene glycol (GLYCOLAX) 17 gram/dose powder Take 17 g by mouth daily as needed (constipation).     Family History     None        Social History Main Topics    Smoking status: Former Smoker     Quit date: 12/26/1995    Smokeless tobacco: Former User      Comment: pt reports stopped smoking 30 years ago    Alcohol use No    Drug use: No     Sexual activity: No     Review of Systems   Constitutional: Positive for appetite change and fever.   HENT: Negative for congestion and rhinorrhea.    Eyes: Negative for photophobia and visual disturbance.   Respiratory: Negative for cough and shortness of breath.    Cardiovascular: Negative for chest pain and leg swelling.   Gastrointestinal: Positive for abdominal pain. Negative for nausea and vomiting.   Genitourinary: Positive for difficulty urinating. Negative for hematuria.   Musculoskeletal: Negative for gait problem and joint swelling.   Skin: Negative for pallor and rash.   Neurological: Negative for weakness and numbness.   Psychiatric/Behavioral: Negative for confusion and dysphoric mood.     Objective:     Vital Signs (Most Recent):  Temp: 98.6 °F (37 °C) (08/29/17 0715)  Pulse: 64 (08/29/17 0715)  Resp: 20 (08/29/17 0715)  BP: (!) 152/82 (08/29/17 0823)  SpO2: 97 % (08/29/17 0715) Vital Signs (24h Range):  Temp:  [97.5 °F (36.4 °C)-103.3 °F (39.6 °C)] 98.6 °F (37 °C)  Pulse:  [60-82] 64  Resp:  [18-20] 20  SpO2:  [95 %-100 %] 97 %  BP: (130-221)/(61-88) 152/82     Weight: 81.2 kg (179 lb)  Body mass index is 27.22 kg/m².    Physical Exam   Constitutional: He is oriented to person, place, and time. He appears well-developed and well-nourished. No distress.   HENT:   Right Ear: External ear normal.   Left Ear: External ear normal.   Nose: Nose normal.   Eyes: EOM are normal. Pupils are equal, round, and reactive to light. Scleral icterus is present.   Neck: Normal range of motion. Neck supple. No thyromegaly present.   Cardiovascular: Normal rate and normal heart sounds.  Exam reveals no friction rub.    No murmur heard.  Pulmonary/Chest: Effort normal and breath sounds normal. No respiratory distress. He has no wheezes. He has no rales.   Abdominal: Soft. Bowel sounds are normal. He exhibits no distension and no mass. There is tenderness.   Musculoskeletal: Normal range of motion. He exhibits no edema or  deformity.   Neurological: He is alert and oriented to person, place, and time. No cranial nerve deficit.   Skin: Skin is warm and dry. No pallor.   Psychiatric: He has a normal mood and affect. His behavior is normal. Thought content normal.        Significant Labs: All pertinent labs within the past 24 hours have been reviewed.    Significant Imaging: I have reviewed all pertinent imaging results/findings within the past 24 hours.

## 2017-08-29 NOTE — OR NURSING
DR. MELARA IS SPEAKING TO THE PRIMARY CARE NURSE REGARDING MR. POLO'S CURRENT STATUS AND THE ADDITION OF BIPAP POST EXTUBATION AND THE ABILITY TO HAVE CLIENT GO THE FLOOR  WITH THE BIPAP IN PLACE.     DR. MELARA REQUESTED THAT DR. ACKERMAN CALL HER TO DISCUSS MR. POLO'S OUTCOME AND PROPER BED PLACEMENT IN THE FACILITY; SHE DISCUSSED THIS WITH VENICE WATTS.

## 2017-08-29 NOTE — OR NURSING
"DREAM[employer]: Load [employer]: LOT: [id number] USE BY: 2020-05-30    [facility] LOCATED; WIRE UP; DUCT INTUBATED AND 9cc CONTRAST REVEALS "STONE" PER DR. CARR.   "

## 2017-08-29 NOTE — HOSPITAL COURSE
In the ER he was found to have severe sepsis with leukocytosis and fevers, likely source being intra-abdominal infection, as well as ARF. Empiric abx with ertapenam and IV fluids were started. LFTs were elevated consistent with choledocholithiasis, cholangitis, and biliary pancreatitis. US showed cholelithiasis without e/o gallbladder wall thickening but an enlarged CBD to 1.8cm. GI took for ERCP 8/29/2917 and placed a stent. The plan was for GI to repeat ERCP w/ sphincterotomy and stone removal later that week followed by cholecystectomy thereafter. He went for EGD on Friday 9/1/17 but his blood pressure was elevated so anesthesia deferred the case until his blood pressure was lower. Mr. Guy had been temporized, feeling well, and eating for the previous 3 days. Given that his case was non-emergent and it was a holiday weekend, it made more sense to discharge him and have him follow up for EGD and cholecystectomy on an outpatient bases. This was discussed with him and he agreed. He was asked to immediately return to the ER if abdominal pain returned. He was asked to continue to hold his Plavix which he was on for chronic PAD. He was initially prescribed cipro to complete a course of abx after discharge. I realized the E coli that grew was resistant up writing my discharge summary the following day. I called the patient but had to leave a message informing him that he needs to take bactrim not cipro. I will continue to try to call him. Rx was e-scribed to [facility] preferred [facility].  Of note, he was on multiple BP meds, some of which were increased during admission. His BP did not appear to be related to stress or pain, but I do suspect he has atherosclerosis making his blood pressure appear elevated and difficult to treat.

## 2017-08-29 NOTE — ASSESSMENT & PLAN NOTE
Continue broad spectrum antibiotics and fluids. Follow up blood cultures. Likely 2/2 intra-abdominal source.

## 2017-08-29 NOTE — OR NURSING
CLIENT PLACED IN SUPINE POSITION BY ANESTHESIA AND ENDO STAFF. ANESTHESIA STAFF CONTROLLED THE HEAD.

## 2017-08-29 NOTE — ANESTHESIA POSTPROCEDURE EVALUATION
"Anesthesia Post Evaluation    Patient: Mandeep Guy    Procedure(s) Performed: Procedure(s) (LRB):  ERCP (N/A)    Final Anesthesia Type: general  Patient location during evaluation: GI PACU  Patient participation: Yes- Able to Participate  Level of consciousness: awake and alert and oriented  Post-procedure vital signs: reviewed and stable  Pain management: adequate  Airway patency: patent  PONV status at discharge: No PONV  Anesthetic complications: no      Cardiovascular status: blood pressure returned to baseline and hypertensive  Respiratory status: unassisted, spontaneous ventilation and room air  Hydration status: euvolemic  Follow-up not needed.        Visit Vitals  BP (!) 168/81 (BP Location: Left arm)   Pulse 77   Temp 36.5 °C (97.7 °F) (Axillary)   Resp (!) 28   Ht 5' 8" (1.727 m)   Wt 81.2 kg (179 lb)   SpO2 99%   BMI 27.22 kg/m²       Pain/Simona Score: Pain Assessment Performed: Yes (8/29/2017  2:36 PM)  Presence of Pain: denies (8/29/2017  2:36 PM)  Pain Rating Prior to Med Admin: 10 (8/28/2017  8:31 PM)  Pain Rating Post Med Admin: 0 (8/28/2017  9:31 PM)  Simona Score: 7 (8/29/2017  4:58 PM)      "

## 2017-08-29 NOTE — PROGRESS NOTES
Called to endo for respiratory treatment.Treatment givn.Bipap ordered.Pt placed on Bipap 12/5 with 50% fio2 sats 98%the patient has coarse crackles bilaterally.Pt alarms on and functioning.

## 2017-08-29 NOTE — CONSULTS
Reason for Consult:  Abd. Pain, Abnormal imaging    HPI:  Pt is a 83 y.o. male who presents to hospital with complaints of abd. Pain x 1 week.  Sx's severe enough to warrant ER evaluation.  Abd. Pain located in epigastrium, sharp, with radiation diffusely.  + associated early satiety, no N/V.  + associated fever.  Now alleviated with pain meds, Abx.  No dysphagia, wt. Loss, yellowing of eyes/skin.  No diarrhea/constipation.  No blood in his stool nor black/tarry stools.  Pt. Thinks he has had a C-scope in the past.      Past Medical History:   Diagnosis Date    Anticoagulant long-term use     Coronary artery disease     GERD (gastroesophageal reflux disease)     Hypertension        Past Surgical History:   Procedure Laterality Date    HERNIA REPAIR      stents         History reviewed. No pertinent family history.    Social History     Social History    Marital status: Single     Spouse name: N/A    Number of children: N/A    Years of education: N/A     Occupational History    Not on file.     Social History Main Topics    Smoking status: Former Smoker     Quit date: 12/26/1995    Smokeless tobacco: Former User      Comment: pt reports stopped smoking 30 years ago    Alcohol use No    Drug use: No    Sexual activity: No     Other Topics Concern    Not on file     Social History Narrative    No narrative on file       Endoscopic History:  C-scope, in the past    Review of patient's allergies indicates:   Allergen Reactions    Penicillins Rash       No current facility-administered medications on file prior to encounter.      Current Outpatient Prescriptions on File Prior to Encounter   Medication Sig Dispense Refill    amlodipine (NORVASC) 10 MG tablet Take 10 mg by mouth once daily.      aspirin 81 MG Chew Take 81 mg by mouth once daily.      clopidogrel (PLAVIX) 75 mg tablet Take 75 mg by mouth once daily.      hydrochlorothiazide (HYDRODIURIL) 12.5 MG Tab Take 12.5 mg by mouth once daily.       lisinopril (PRINIVIL,ZESTRIL) 20 MG tablet Take 40 mg by mouth once daily.      pravastatin (PRAVACHOL) 40 MG tablet Take 40 mg by mouth once daily.      carvedilol (COREG) 6.25 MG tablet Take 1 tablet (6.25 mg total) by mouth 2 (two) times daily with meals. 60 tablet 0    ciprofloxacin HCl (CIPRO) 500 MG tablet Take 500 mg by mouth 2 (two) times daily.      ergocalciferol (ERGOCALCIFEROL) 50,000 unit Cap Take 50,000 Units by mouth every 7 days.      melatonin 3 mg TbDL Take by mouth.      nitroGLYCERIN (NITROSTAT) 0.4 MG SL tablet Place 1 tablet (0.4 mg total) under the tongue every 5 (five) minutes as needed for Chest pain (if more than three doses needed, seek urgent medical attention). 30 tablet 0    pantoprazole (PROTONIX) 40 MG tablet Take 40 mg by mouth once daily.      polyethylene glycol (GLYCOLAX) 17 gram/dose powder Take 17 g by mouth daily as needed (constipation). 1 Bottle 0     Scheduled Meds:   [START ON 8/30/2017] ertapenem (INVANZ) IVPB  500 mg Intravenous Q24H    famotidine (PF)  20 mg Intravenous Daily     Continuous Infusions:   sodium chloride 0.9% 150 mL/hr at 08/29/17 0553     PRN Meds:.acetaminophen, HYDROmorphone, morphine, omnipaque 300 iohexol, ondansetron, promethazine (PHENERGAN) IVPB    Review of Systems:  CONSTITUTIONAL: Negative for weakness, fever, weight loss, weight gain.  HEENT: Eyes: Negative for double/blurred vision. Ears: Negative pain or loss of hearing. Nose:Negative for nasal congestion. Negative for rhinorrhea Mouth: Negative for dry mouth/pain Throat: Negative for masses or hoarseness.  CARDIOVASCULAR: Negative for chest pain or palpitations.  RESPIRATORY: Negative for SOB, wheezes  GASTROINTESTINAL: See HPI  GENITOURINARY: Negative for dysuria/hematuria.  MUSCULOSKELETAL: Negative for osteoarthritis, muscle pain.  SKIN: Negative for rashes/lesions.  NEUROLOGIC: Negative for headaches, numbness/tingling.  ENDOCRINE: Negative for diabetes or thyroid  "abnormalities.  HEMATOLOGIC: Negative for anemia or blood dyscrasias.    Patient Vitals for the past 24 hrs:   BP Temp Temp src Pulse Resp SpO2 Height Weight   08/29/17 0500 - - - - - - - 81.2 kg (179 lb)   08/29/17 0400 (!) 165/77 98.2 °F (36.8 °C) Oral 60 18 98 % - -   08/28/17 2351 (!) 185/81 97.5 °F (36.4 °C) Oral 69 - 98 % 5' 8" (1.727 m) 81.5 kg (179 lb 10.8 oz)   08/28/17 2234 - 98.3 °F (36.8 °C) Oral - - - - -   08/28/17 2223 130/61 - - 62 - 98 % - -   08/28/17 2112 - 99 °F (37.2 °C) Oral - - - - -   08/28/17 2037 (!) 159/68 - - 78 18 95 % - -   08/28/17 2031 - (!) 101.2 °F (38.4 °C) - - - - - -   08/28/17 2012 - (!) 101.2 °F (38.4 °C) Oral - - - - -   08/28/17 2011 (!) 158/68 - - 80 18 95 % - -   08/28/17 1915 (!) 192/81 - - 82 - 96 % - -   08/28/17 1843 (!) 221/88 (!) 102.7 °F (39.3 °C) Oral 78 - 100 % - -   08/28/17 1755 (!) 140/73 (!) 103.3 °F (39.6 °C) Oral 70 18 97 % 5' 7" (1.702 m) 86.6 kg (191 lb)       Gen: Well developed, well nourished, no apparent distress, cooperative  HEENT: Anicteric, PERRLA, normocephalic, neck symmetrical  CV: S1, S2, no murmers/rubs, non-displaced PMI  Lungs: CTA-B, normal excursion  Abd: Soft, NT, ND, normal BS's, no HSM  Ext: No c/c/e, 1+ DP pulses to BLE's  Neuro: CN II-XII grossly intact, no asterixis.  Skin: No rashes/lesions, normal texture  Psych: AA&O x 4, normal affect    Labs:    Recent Labs  Lab 08/28/17 1948   CALCIUM 9.1   PROT 6.3      K 4.4   CO2 20*      BUN 22   CREATININE 2.0*   ALKPHOS 397*   *   *   BILITOT 2.7*     Recent Results (from the past 336 hour(s))   CBC auto differential    Collection Time: 08/29/17  4:50 AM   Result Value Ref Range    WBC 22.18 (H) 3.90 - 12.70 K/uL    Hemoglobin 11.8 (L) 14.0 - 18.0 g/dL    Hematocrit 35.2 (L) 40.0 - 54.0 %    Platelets 390 (H) 150 - 350 K/uL   CBC W/ AUTO DIFFERENTIAL    Collection Time: 08/28/17  6:07 PM   Result Value Ref Range    WBC 18.18 (H) 3.90 - 12.70 K/uL    Hemoglobin " 12.9 (L) 14.0 - 18.0 g/dL    Hematocrit 37.8 (L) 40.0 - 54.0 %    Platelets 425 (H) 150 - 350 K/uL       Recent Labs  Lab 08/28/17 1948   INR 1.1     Lipase > 1000    Imaging:  U/S:  1.  Cholelithiasis without evidence of gallbladder wall thickening.  Negative sonographic Mendez's.  The findings findings are indeterminate for acute cholecystitis.  A HIDA scan may be obtained for further evaluation.    2.  Enlarged CBD measuring up to 1.8 cm the soft tissue within the CBD.  ERCP is suggested for further evaluation.    3.  Hepatic and renal cysts as described above.    CT:  1.  Distention of the gallbladder with interval increase in diameter of the common duct measuring up to 2 cm.  There is concern for distal choledocholithiasis as noted on previous exam however evaluation is somewhat limited given the presence of a duodenal diverticulum in the region.  There is questionable mild inflammation about the distal common duct and duodenal C-loop, correlation with pancreatic enzymes is advised to exclude pancreatitis.  Inflammation about the duct may be present, with developing cholecystitis a consideration.  Correlation advised, ERCP as warranted.    2.  Hepatic cysts, noting 1 low attenuating lesion is of attenuation higher than a simple cyst.  MRI can be performed on a non-emergent basis for full characterization of the hepatic parenchyma noting larger low attenuating lesion within the right hepatic dome has resolved.    3.  Hiatal hernia.      Assessment:  Pt. Is a 83 y.o. male with:  1. Choledocholithiasis, per imaging - Concerning for cholangitis, as has fever and elevated WBC, + blood Cx's.  On Abx.  2. Acute pancreatitis, biliary  3. Abnormal LFT's - secondary to #1/2  4. HTN, CAD (On Plavix), PAD.....    Recommendations:  1. Monitor abd. Exam.  2. Follow LFT's.  3. Abx.  4. NPO.  5. ERCP with stent placement today, with anesthesia.  Will have to repeat ERCP once Plavix is on hold for 5 days for definitive  sphincterotomy and stone extraction.  6. Gen Surg evaluation for cholecystectomy.      I would like to take this opportunity to thank you for this consult.  If you have any questions or concerns, please do not hesitate to contact me.

## 2017-08-29 NOTE — ASSESSMENT & PLAN NOTE
Reportedly going to have a repair by Dr. Ashby on the day of presentation. CT without apparent issue at this time. Follow up when acute issue resolved.

## 2017-08-29 NOTE — NURSING
"Pt arrived back to room via stretcher. Assisted pt from stretcher to bed. Pt states " I can't breathe, I cant breathe!''. Pt appears very SOB. Found RT in [place] and he immediately came and placed pt on BIPAP. Notified Dr. Bates of pt's elevated BP(200s systolic) and current respiratory status. MD ordered to leave pt on BIPAP and recheck BP in 30 min or when pt seems calm; if BP is greater than 170 systolic, give pt PRN dose of hydralizine early. Safety maintained. Will continue to monitor pt.   "

## 2017-08-29 NOTE — PLAN OF CARE
Problem: Fall Risk (Adult)  Goal: Absence of Falls  Patient will demonstrate the desired outcomes by discharge/transition of care.   Outcome: Ongoing (interventions implemented as appropriate)   08/29/17 0428   Fall Risk (Adult)   Absence of Falls making progress toward outcome       Problem: Patient Care Overview  Goal: Plan of Care Review  Outcome: Ongoing (interventions implemented as appropriate)   08/29/17 0428   Coping/Psychosocial   Plan Of Care Reviewed With patient       Problem: Pain, Acute (Adult)  Goal: Acceptable Pain Control/Comfort Level  Patient will demonstrate the desired outcomes by discharge/transition of care.  Outcome: Ongoing (interventions implemented as appropriate)   08/29/17 0428   Pain, Acute (Adult)   Acceptable Pain Control/Comfort Level making progress toward outcome       Problem: Nausea/Vomiting (Adult)  Goal: Symptom Relief  Patient will demonstrate the desired outcomes by discharge/transition of care.  Outcome: Ongoing (interventions implemented as appropriate)   08/29/17 0428   Nausea/Vomiting (Adult)   Symptom Relief achieves outcome     Goal: Adequate Hydration  Patient will demonstrate the desired outcomes by discharge/transition of care.  Outcome: Ongoing (interventions implemented as appropriate)   08/29/17 0428   Nausea/Vomiting (Adult)   Adequate Hydration making progress toward outcome

## 2017-08-29 NOTE — OR NURSING
VITAL SIGNS, AIRWAY AND SEDATION PROCESS MONITORED AND MAINTAINED BY ANESTHESIA STAFF THROUGHOUT THE PROCEDURE.     BITE BLOCK IN POSITION.    CLIENT PLACED IN PRONE, INTUBATED PRIOR TO POSITION CHANGE BY ANESTHESIA STAFF.

## 2017-08-29 NOTE — HPI
Mr. Guy is a pleasant 84 yo man with HTN, CAD, PAD s/p stent on chronic anticoagulation, and a right inguinal hernia who presented for 5 days of intermittent abdominal pain. The pain was diffuse but he reported mostly in the lower abdomen. He described it as sharp. Eating made it worse. He denied nausea and vomiting. He also reported difficulty urinating and fevers.

## 2017-08-29 NOTE — H&P
[facility] Hospital Medicine  History & Physical    Patient Name: Mandeep Guy  MRN: [id number]  Admission Date: 8/28/2017  Attending Physician: Ina Bates MD  Primary Care Provider: Giancarlo Teran MD         Patient information was obtained from patient, past medical records and ER records.     Subjective:     Principal Problem:Severe sepsis    Chief Complaint:   Chief Complaint   Patient presents with    Abdominal Pain     Pt reports increasing abdominal pain today and dificulty urinating. Pt also scheduled for hernia repair today but could not make it to surgery. Temp on arrival 103.3        HPI: Mr. Guy is a pleasant 84 yo man with HTN, CAD, PAD s/p stent on chronic anticoagulation, and a right inguinal hernia who presented for 5 days of intermittent abdominal pain. The pain was diffuse but he reported mostly in the lower abdomen. He described it as sharp. Eating made it worse. He denied nausea and vomiting. He also reported difficulty urinating and fevers.    In the ER he was found to have severe sepsis with leukocytosis and fevers, likely source being intra-abdominal infection, as well as ARF. Empiric abx with ertapenam and IV fluids were started. LFTs were elevated consistent with choledocholithiasis. US showed cholelithiasis without e/o gallbladder wall thickening but an enlarged CBD to 1.8cm. GI was consulted for ERCP. CT abdomen/pelvia showed distention of the gallbladder with interval increase of CBD to 2.0cm. Inflammation of the CD and duodenum was also noted and lipase was elevated consistent with pancreatitis.    Past Medical History:   Diagnosis Date    Anticoagulant long-term use     Coronary artery disease     GERD (gastroesophageal reflux disease)     Hypertension     PAD (peripheral artery disease)     s/p stent       Past Surgical History:   Procedure Laterality Date    HERNIA REPAIR      stents         Review of patient's allergies indicates:   Allergen  Reactions    Penicillins Rash       No current facility-administered medications on file prior to encounter.      Current Outpatient Prescriptions on File Prior to Encounter   Medication Sig    amlodipine (NORVASC) 10 MG tablet Take 10 mg by mouth once daily.    aspirin 81 MG Chew Take 81 mg by mouth once daily.    clopidogrel (PLAVIX) 75 mg tablet Take 75 mg by mouth once daily.    hydrochlorothiazide (HYDRODIURIL) 12.5 MG Tab Take 12.5 mg by mouth once daily.    lisinopril (PRINIVIL,ZESTRIL) 20 MG tablet Take 40 mg by mouth once daily.    pravastatin (PRAVACHOL) 40 MG tablet Take 40 mg by mouth once daily.    carvedilol (COREG) 6.25 MG tablet Take 1 tablet (6.25 mg total) by mouth 2 (two) times daily with meals.    ciprofloxacin HCl (CIPRO) 500 MG tablet Take 500 mg by mouth 2 (two) times daily.    ergocalciferol (ERGOCALCIFEROL) 50,000 unit Cap Take 50,000 Units by mouth every 7 days.    melatonin 3 mg TbDL Take by mouth.    nitroGLYCERIN (NITROSTAT) 0.4 MG SL tablet Place 1 tablet (0.4 mg total) under the tongue every 5 (five) minutes as needed for Chest pain (if more than three doses needed, seek urgent medical attention).    pantoprazole (PROTONIX) 40 MG tablet Take 40 mg by mouth once daily.    polyethylene glycol (GLYCOLAX) 17 gram/dose powder Take 17 g by mouth daily as needed (constipation).     Family History     None        Social History Main Topics    Smoking status: Former Smoker     Quit date: 12/26/1995    Smokeless tobacco: Former User      Comment: pt reports stopped smoking 30 years ago    Alcohol use No    Drug use: No    Sexual activity: No     Review of Systems   Constitutional: Positive for appetite change and fever.   HENT: Negative for congestion and rhinorrhea.    Eyes: Negative for photophobia and visual disturbance.   Respiratory: Negative for cough and shortness of breath.    Cardiovascular: Negative for chest pain and leg swelling.   Gastrointestinal: Positive for  abdominal pain. Negative for nausea and vomiting.   Genitourinary: Positive for difficulty urinating. Negative for hematuria.   Musculoskeletal: Negative for gait problem and joint swelling.   Skin: Negative for pallor and rash.   Neurological: Negative for weakness and numbness.   Psychiatric/Behavioral: Negative for confusion and dysphoric mood.     Objective:     Vital Signs (Most Recent):  Temp: 98.6 °F (37 °C) (08/29/17 0715)  Pulse: 64 (08/29/17 0715)  Resp: 20 (08/29/17 0715)  BP: (!) 152/82 (08/29/17 0823)  SpO2: 97 % (08/29/17 0715) Vital Signs (24h Range):  Temp:  [97.5 °F (36.4 °C)-103.3 °F (39.6 °C)] 98.6 °F (37 °C)  Pulse:  [60-82] 64  Resp:  [18-20] 20  SpO2:  [95 %-100 %] 97 %  BP: (130-221)/(61-88) 152/82     Weight: 81.2 kg (179 lb)  Body mass index is 27.22 kg/m².    Physical Exam   Constitutional: He is oriented to person, place, and time. He appears well-developed and well-nourished. No distress.   HENT:   Right Ear: External ear normal.   Left Ear: External ear normal.   Nose: Nose normal.   Eyes: EOM are normal. Pupils are equal, round, and reactive to light. Scleral icterus is present.   Neck: Normal range of motion. Neck supple. No thyromegaly present.   Cardiovascular: Normal rate and normal heart sounds.  Exam reveals no friction rub.    No murmur heard.  Pulmonary/Chest: Effort normal and breath sounds normal. No respiratory distress. He has no wheezes. He has no rales.   Abdominal: Soft. Bowel sounds are normal. He exhibits no distension and no mass. There is tenderness.   Musculoskeletal: Normal range of motion. He exhibits no edema or deformity.   Neurological: He is alert and oriented to person, place, and time. No cranial nerve deficit.   Skin: Skin is warm and dry. No pallor.   Psychiatric: He has a normal mood and affect. His behavior is normal. Thought content normal.        Significant Labs: All pertinent labs within the past 24 hours have been reviewed.    Significant Imaging: I  have reviewed all pertinent imaging results/findings within the past 24 hours.    Assessment/Plan:     * Severe sepsis    Continue broad spectrum antibiotics and fluids. Follow up blood cultures. Likely 2/2 intra-abdominal source.        Gram-negative bacteremia    As above        Choledocholithiasis    Plan for ERCP.        Acute pancreatitis    NPO. IV fluids. Pain improved.        Acute renal failure    Fluids and monitor. 2/2 sepsis.        Unilateral inguinal hernia    Reportedly going to have a repair by Dr. Ashby on the day of presentation. CT without apparent issue at this time. Follow up when acute issue resolved.        PAD (peripheral artery disease)    Hold home meds as NPO. No acute issue.        Essential hypertension    Hold home meds as NPO. No acute issue. PRN hydralazine.        CAD (coronary artery disease)    Hold home meds as NPO. No acute issue.          VTE Risk Mitigation         Ordered     Medium Risk of VTE  Once      08/28/17 2351     Place sequential compression device  Until discontinued      08/28/17 2351     Place PRATEEK hose  Until discontinued      08/28/17 2351     Reason for No Pharmacological VTE Prophylaxis  Once      08/28/17 2351             Ina Bates MD  Department of Hospital Medicine   [facility]

## 2017-08-30 PROBLEM — K85.10 ACUTE BILIARY PANCREATITIS: Status: ACTIVE | Noted: 2017-08-29

## 2017-08-30 LAB
ALBUMIN SERPL BCP-MCNC: 1.7 G/DL
ALBUMIN SERPL BCP-MCNC: 1.7 G/DL
ALP SERPL-CCNC: 273 U/L
ALP SERPL-CCNC: 273 U/L
ALT SERPL W/O P-5'-P-CCNC: 131 U/L
ALT SERPL W/O P-5'-P-CCNC: 131 U/L
ANION GAP SERPL CALC-SCNC: 9 MMOL/L
AST SERPL-CCNC: 63 U/L
AST SERPL-CCNC: 63 U/L
BACTERIA BLD CULT: NORMAL
BACTERIA UR CULT: NO GROWTH
BASOPHILS # BLD AUTO: 0.01 K/UL
BASOPHILS NFR BLD: 0.1 %
BILIRUB DIRECT SERPL-MCNC: 0.6 MG/DL
BILIRUB SERPL-MCNC: 0.8 MG/DL
BILIRUB SERPL-MCNC: 0.8 MG/DL
BUN SERPL-MCNC: 20 MG/DL
CALCIUM SERPL-MCNC: 7.2 MG/DL
CHLORIDE SERPL-SCNC: 117 MMOL/L
CO2 SERPL-SCNC: 17 MMOL/L
CREAT SERPL-MCNC: 1.4 MG/DL
DIFFERENTIAL METHOD: ABNORMAL
EOSINOPHIL # BLD AUTO: 0 K/UL
EOSINOPHIL NFR BLD: 0.2 %
ERYTHROCYTE [DISTWIDTH] IN BLOOD BY AUTOMATED COUNT: 14.1 %
EST. GFR  (AFRICAN AMERICAN): 53 ML/MIN/1.73 M^2
EST. GFR  (NON AFRICAN AMERICAN): 46 ML/MIN/1.73 M^2
GLUCOSE SERPL-MCNC: 81 MG/DL
HCT VFR BLD AUTO: 32 %
HGB BLD-MCNC: 11 G/DL
LYMPHOCYTES # BLD AUTO: 1 K/UL
LYMPHOCYTES NFR BLD: 7.9 %
MCH RBC QN AUTO: 30.6 PG
MCHC RBC AUTO-ENTMCNC: 34.4 G/DL
MCV RBC AUTO: 89 FL
MONOCYTES # BLD AUTO: 1 K/UL
MONOCYTES NFR BLD: 7.3 %
NEUTROPHILS # BLD AUTO: 11.1 K/UL
NEUTROPHILS NFR BLD: 84.5 %
PLATELET # BLD AUTO: 349 K/UL
PMV BLD AUTO: 10.4 FL
POTASSIUM SERPL-SCNC: 3.8 MMOL/L
PROT SERPL-MCNC: 4.4 G/DL
PROT SERPL-MCNC: 4.4 G/DL
RBC # BLD AUTO: 3.59 M/UL
SODIUM SERPL-SCNC: 143 MMOL/L
WBC # BLD AUTO: 13.12 K/UL

## 2017-08-30 PROCEDURE — 80053 COMPREHEN METABOLIC PANEL: CPT

## 2017-08-30 PROCEDURE — 36415 COLL VENOUS BLD VENIPUNCTURE: CPT

## 2017-08-30 PROCEDURE — 85025 COMPLETE CBC W/AUTO DIFF WBC: CPT

## 2017-08-30 PROCEDURE — 25000003 PHARM REV CODE 250: Performed by: INTERNAL MEDICINE

## 2017-08-30 PROCEDURE — 12000002 HC ACUTE/MED SURGE SEMI-PRIVATE ROOM

## 2017-08-30 PROCEDURE — 63600175 PHARM REV CODE 636 W HCPCS: Performed by: INTERNAL MEDICINE

## 2017-08-30 PROCEDURE — 25000003 PHARM REV CODE 250: Performed by: EMERGENCY MEDICINE

## 2017-08-30 PROCEDURE — S0028 INJECTION, FAMOTIDINE, 20 MG: HCPCS | Performed by: EMERGENCY MEDICINE

## 2017-08-30 RX ORDER — HYDROCHLOROTHIAZIDE 12.5 MG/1
12.5 TABLET ORAL DAILY
Status: DISCONTINUED | OUTPATIENT
Start: 2017-08-30 | End: 2017-09-01

## 2017-08-30 RX ORDER — AMLODIPINE BESYLATE 5 MG/1
10 TABLET ORAL DAILY
Status: DISCONTINUED | OUTPATIENT
Start: 2017-08-30 | End: 2017-09-02 | Stop reason: HOSPADM

## 2017-08-30 RX ORDER — LISINOPRIL 20 MG/1
20 TABLET ORAL DAILY
Status: DISCONTINUED | OUTPATIENT
Start: 2017-08-30 | End: 2017-09-02 | Stop reason: HOSPADM

## 2017-08-30 RX ORDER — CARVEDILOL 6.25 MG/1
6.25 TABLET ORAL 2 TIMES DAILY
Status: DISCONTINUED | OUTPATIENT
Start: 2017-08-30 | End: 2017-09-01

## 2017-08-30 RX ADMIN — SODIUM CHLORIDE 0.5 G: 9 INJECTION, SOLUTION INTRAVENOUS at 12:08

## 2017-08-30 RX ADMIN — CARVEDILOL 6.25 MG: 6.25 TABLET, FILM COATED ORAL at 11:08

## 2017-08-30 RX ADMIN — AMLODIPINE BESYLATE 10 MG: 5 TABLET ORAL at 11:08

## 2017-08-30 RX ADMIN — SODIUM CHLORIDE: 0.9 INJECTION, SOLUTION INTRAVENOUS at 07:08

## 2017-08-30 RX ADMIN — FAMOTIDINE 20 MG: 10 INJECTION, SOLUTION INTRAVENOUS at 09:08

## 2017-08-30 RX ADMIN — SODIUM CHLORIDE: 0.9 INJECTION, SOLUTION INTRAVENOUS at 09:08

## 2017-08-30 RX ADMIN — CEFTRIAXONE 2 G: 2 INJECTION, SOLUTION INTRAVENOUS at 10:08

## 2017-08-30 RX ADMIN — HYDRALAZINE HYDROCHLORIDE 10 MG: 20 INJECTION INTRAMUSCULAR; INTRAVENOUS at 11:08

## 2017-08-30 RX ADMIN — CARVEDILOL 6.25 MG: 6.25 TABLET, FILM COATED ORAL at 09:08

## 2017-08-30 RX ADMIN — SODIUM CHLORIDE: 0.9 INJECTION, SOLUTION INTRAVENOUS at 04:08

## 2017-08-30 RX ADMIN — HYDROCHLOROTHIAZIDE 12.5 MG: 12.5 TABLET ORAL at 11:08

## 2017-08-30 RX ADMIN — LISINOPRIL 20 MG: 20 TABLET ORAL at 11:08

## 2017-08-30 NOTE — PROGRESS NOTES
[facility] Hospital Medicine  Progress Note    Patient Name: Mandeep Guy  MRN: [id number]  Patient Class: IP- Inpatient   Admission Date: 8/28/2017  Length of Stay: 2 days  Attending Physician: Ina Bates MD  Primary Care Provider: Giancarlo Teran MD        Subjective:     Principal Problem:Severe sepsis    HPI:  Mr. Guy is a pleasant 84 yo man with HTN, CAD, PAD s/p stent on chronic anticoagulation, and a right inguinal hernia who presented for 5 days of intermittent abdominal pain. The pain was diffuse but he reported mostly in the lower abdomen. He described it as sharp. Eating made it worse. He denied nausea and vomiting. He also reported difficulty urinating and fevers.    Hospital Course:  In the ER he was found to have severe sepsis with leukocytosis and fevers, likely source being intra-abdominal infection, as well as ARF. Empiric abx with ertapenam and IV fluids were started. LFTs were elevated consistent with choledocholithiasis, cholangitis, and biliary pancreatitis. US showed cholelithiasis without e/o gallbladder wall thickening but an enlarged CBD to 1.8cm. GI took for ERCP 8/29/2917 and placed a stent. Plan for GI for repeat ERCP, sphincterotomy and stone removal later this week and then surgery for cholecystectomy thereafter. Continue abx. Plavix held since Monday.    Review of Systems   Constitutional: Negative for appetite change and fever.   HENT: Negative for congestion and rhinorrhea.    Eyes: Negative for photophobia and visual disturbance.   Respiratory: Negative for cough and shortness of breath.    Cardiovascular: Negative for chest pain and leg swelling.   Gastrointestinal: Negative for abdominal pain, nausea and vomiting.   Musculoskeletal: Negative for gait problem and joint swelling.   Skin: Negative for pallor and rash.   Neurological: Negative for weakness and numbness.   Psychiatric/Behavioral: Negative for confusion and dysphoric mood.     Objective:      Vital Signs (Most Recent):  Temp: 98.4 °F (36.9 °C) (08/30/17 1152)  Pulse: 69 (08/30/17 1152)  Resp: 18 (08/30/17 1152)  BP: (!) 149/64 (08/30/17 1152)  SpO2: 100 % (08/30/17 1152) Vital Signs (24h Range):  Temp:  [97.3 °F (36.3 °C)-98.8 °F (37.1 °C)] 98.4 °F (36.9 °C)  Pulse:  [68-89] 69  Resp:  [18-28] 18  SpO2:  [97 %-100 %] 100 %  BP: (142-203)/() 149/64     Weight: 80.8 kg (178 lb 3.2 oz)  Body mass index is 27.1 kg/m².    Physical Exam   Constitutional: He is oriented to person, place, and time. He appears well-developed and well-nourished. No distress.   HENT:   Right Ear: External ear normal.   Left Ear: External ear normal.   Nose: Nose normal.   Eyes: EOM are normal. Pupils are equal, round, and reactive to light. No scleral icterus.   Neck: Normal range of motion. Neck supple. No thyromegaly present.   Cardiovascular: Normal rate and normal heart sounds.  Exam reveals no friction rub.    No murmur heard.  Pulmonary/Chest: Effort normal and breath sounds normal. No respiratory distress. He has no wheezes. He has no rales.   Abdominal: Soft. Bowel sounds are normal. He exhibits no distension and no mass. There is no tenderness.   Musculoskeletal: Normal range of motion. He exhibits no edema or deformity.   Neurological: He is alert and oriented to person, place, and time. No cranial nerve deficit.   Skin: Skin is warm and dry. No pallor.   Psychiatric: He has a normal mood and affect. His behavior is normal. Thought content normal.        Significant Labs: All pertinent labs within the past 24 hours have been reviewed.    Significant Imaging: I have reviewed all pertinent imaging results/findings within the past 24 hours.    Assessment/Plan:      * Severe sepsis    Blood Cx with E coli sensitive to ceftriaxone and ertapenam. Likely 2/2 intra-abdominal source. Improved.        Gram-negative bacteremia    As above        Choledocholithiasis    Plan for ERCP.        Acute biliary pancreatitis    2/2  above. Diet advanced. Pain resolved.        Acute renal failure    Fluids and monitor. 2/2 sepsis. Improved.        Unilateral inguinal hernia    Reportedly going to have a repair by Dr. Ashby on the day of presentation. CT without apparent issue at this time. Follow up when acute issue resolved.        PAD (peripheral artery disease)    Plavix on hold until after procedures. No acute issue.        Essential hypertension    Restarted lisinopril, coreg, and amlodipine. No acute issue. PRN hydralazine.        CAD (coronary artery disease)    Cont BB and ACEi. Hold statin in the setting of elevated LFTs. OAC on hold until post-procedures. No acute issue.          VTE Risk Mitigation         Ordered     Medium Risk of VTE  Once      08/28/17 2351     Place sequential compression device  Until discontinued      08/28/17 2351     Place PRATEEK hose  Until discontinued      08/28/17 2351     Reason for No Pharmacological VTE Prophylaxis  Once      08/28/17 2351              Ina Bates MD  Department of Hospital Medicine   [facility]

## 2017-08-30 NOTE — NURSING
Bedside report given to VENICE Pennington. Pt states he feels like he is breathing better. Pt appears calm and denies any needs. Safety maintained.

## 2017-08-30 NOTE — PLAN OF CARE
Problem: Fall Risk (Adult)  Intervention: Review Medications/Identify Contributors to Fall Risk   08/30/17 1513   Safety Interventions   Medication Review/Management medications reviewed     Intervention: Patient Rounds   08/30/17 1513   Safety Interventions   Patient Rounds bed in low position;bed wheels locked;call light in reach;clutter free environment maintained;ID band on;visualized patient;toileting offered;placement of personal items at bedside     Intervention: Safety Promotion/Fall Prevention   08/30/17 1513   Safety Interventions   Safety Promotion/Fall Prevention side rails raised x 2;bed alarm set;nonskid shoes/socks when out of bed       Goal: Absence of Falls  Patient will demonstrate the desired outcomes by discharge/transition of care.    08/30/17 1513   Fall Risk (Adult)   Absence of Falls making progress toward outcome       Problem: Patient Care Overview  Goal: Plan of Care Review   08/30/17 1513   Coping/Psychosocial   Plan Of Care Reviewed With patient     Goal: Interdisciplinary Rounds/Family Conf   08/30/17 1513   Interdisciplinary Rounds/Family Conf   Participants nursing;patient;physician       Problem: Pain, Acute (Adult)  Intervention: Monitor/Manage Analgesia   08/30/17 1513   Manage Acute Burn Pain   Bowel Intervention adequate fluid intake promoted;privacy promoted     Intervention: Mutually Develop/Implement Acute Pain Management Plan   08/30/17 1513   Cognitive Interventions   Sensory Stimulation Regulation music/television provided for relaxation     Intervention: Support/Optimize Psychosocial Response to Acute Pain   08/30/17 1513   Psychosocial Support   Trust Relationship/Rapport care explained;choices provided;emotional support provided;questions answered;questions encouraged;reassurance provided;thoughts/feelings acknowledged;empathic listening provided       Goal: Identify Related Risk Factors and Signs and Symptoms  Related risk factors and signs and symptoms are identified  upon initiation of Human Response Clinical Practice Guideline (CPG)    08/30/17 1513   Pain, Acute   Signs and Symptoms (Acute Pain) fatigue/weakness     Goal: Acceptable Pain Control/Comfort Level  Patient will demonstrate the desired outcomes by discharge/transition of care.    08/30/17 1513   Pain, Acute (Adult)   Acceptable Pain Control/Comfort Level making progress toward outcome

## 2017-08-30 NOTE — ADDENDUM NOTE
Addendum  created 08/30/17 0719 by Ronal Mendenhall, CRNA    Anesthesia Event edited, Anesthesia Intra Meds edited

## 2017-08-30 NOTE — ASSESSMENT & PLAN NOTE
Blood Cx with E coli sensitive to ceftriaxone and ertapenam. Likely 2/2 intra-abdominal source. Improved.

## 2017-08-30 NOTE — PROGRESS NOTES
"[facility]    CC: Elevated LFTs with pain    HPI 83 y.o. male with no pain this AM.  No bleeding.  No N/V.  Feels great.      Past Medical History:   Diagnosis Date    Anticoagulant long-term use     Coronary artery disease     GERD (gastroesophageal reflux disease)     Hypertension     PAD (peripheral artery disease)     s/p stent         Review of Systems  General ROS: negative for chills, fever   Cardiovascular ROS: no chest pain or dyspnea     Physical Examination  BP (!) 166/77   Pulse 68   Temp 98 °F (36.7 °C) (Oral)   Resp 18   Ht 5' 8" (1.727 m)   Wt 80.8 kg (178 lb 3.2 oz)   SpO2 98%   BMI 27.10 kg/m²   General appearance: alert, cooperative, no distress  HENT: Normocephalic, atraumatic, neck symmetrical, no nasal discharge   Eyes: conjunctivae/corneas clear, no icterus, EOM's intact  Lungs: resp non-labored  Heart: regular rate   Abdomen: soft, non-tender; ND  Extremities: extremities symmetric; no clubbing, cyanosis  Neurologic: Alert and oriented X 3, MAEW    Assessment:     Cholangitis secondary to choledocolithiasis s/p stent placement yest.  Clinically improved.     Plan:   - Cardiac diet  - Plavix on hold - last dose Monday  - Will need repeat ERCP with stent removal and stone extraction later this week/early next week      "

## 2017-08-30 NOTE — PLAN OF CARE
Problem: Breathing Pattern Ineffective (Adult)  Intervention: Optimize Oxygenation/Ventilation/Perfusion   08/30/17 0325   Respiratory Interventions   Airway/Ventilation Management calming measures promoted   Breathing Techniques/Airway Clearance deep/controlled cough encouraged   Positioning   Head of Bed (HOB) HOB elevated     Intervention: Minimize Oxygen Consumption/Demand   08/30/17 0325   Activity   Activity Type activity adjusted per tolerance   Coping/Psychosocial Interventions   Environmental Support calm environment promoted     Intervention: Monitor/Manage Contributing Psychosocial Factors   08/30/17 0325   Coping/Psychosocial Interventions   Supportive Measures active listening utilized;verbalization of feelings encouraged         Comments: Pt was on BiPap at beginning of shift due to severe shortness of breath shortly after returning to the floor from having ERCP procedure; patient is now on Room Air, O2 sat in upper 90s, no s/s distress noted; no complaints of pain or discomfort; will assess resp status on patient rounds and PRN

## 2017-08-30 NOTE — ASSESSMENT & PLAN NOTE
Cont BB and ACEi. Hold statin in the setting of elevated LFTs. OAC on hold until post-procedures. No acute issue.

## 2017-08-30 NOTE — CONSULTS
[facility]  General Surgery  Consult Note    Consults  Subjective:     Chief Complaint/Reason for Admission: cholangitis, cholecdocholithiasis    History of Present Illness: 83M admitted with right upper quadrant abdominal pain, fever, found to have choledocholithiasis, cholangitis, and gallstone pancreatitis.   He is on plavix, last taken on 8/28. ERCP and stent placement was performed 8/29/17, with plans for stent removal, sphincterotomy and stone extraction this week.  Surgery consulted for interval cholecystectomy.     No current facility-administered medications on file prior to encounter.      Current Outpatient Prescriptions on File Prior to Encounter   Medication Sig    amlodipine (NORVASC) 10 MG tablet Take 10 mg by mouth once daily.    aspirin 81 MG Chew Take 81 mg by mouth once daily.    clopidogrel (PLAVIX) 75 mg tablet Take 75 mg by mouth once daily.    hydrochlorothiazide (HYDRODIURIL) 12.5 MG Tab Take 12.5 mg by mouth once daily.    lisinopril (PRINIVIL,ZESTRIL) 20 MG tablet Take 40 mg by mouth once daily.    pravastatin (PRAVACHOL) 40 MG tablet Take 40 mg by mouth once daily.    carvedilol (COREG) 6.25 MG tablet Take 1 tablet (6.25 mg total) by mouth 2 (two) times daily with meals.    ciprofloxacin HCl (CIPRO) 500 MG tablet Take 500 mg by mouth 2 (two) times daily.    ergocalciferol (ERGOCALCIFEROL) 50,000 unit Cap Take 50,000 Units by mouth every 7 days.    melatonin 3 mg TbDL Take by mouth.    nitroGLYCERIN (NITROSTAT) 0.4 MG SL tablet Place 1 tablet (0.4 mg total) under the tongue every 5 (five) minutes as needed for Chest pain (if more than three doses needed, seek urgent medical attention).    pantoprazole (PROTONIX) 40 MG tablet Take 40 mg by mouth once daily.    polyethylene glycol (GLYCOLAX) 17 gram/dose powder Take 17 g by mouth daily as needed (constipation).       Review of patient's allergies indicates:   Allergen Reactions    Penicillins Rash       Past  Medical History:   Diagnosis Date    Anticoagulant long-term use     Coronary artery disease     GERD (gastroesophageal reflux disease)     Hypertension     PAD (peripheral artery disease)     s/p stent     Past Surgical History:   Procedure Laterality Date    HERNIA REPAIR      stents       Family History     None        Social History Main Topics    Smoking status: Former Smoker     Quit date: 12/26/1995    Smokeless tobacco: Former User      Comment: pt reports stopped smoking 30 years ago    Alcohol use No    Drug use: No    Sexual activity: No     Review of Systems   Pertinent per HPI  Objective:     Vital Signs (Most Recent):  Temp: 98.6 °F (37 °C) (08/30/17 0815)  Pulse: 72 (08/30/17 0815)  Resp: 18 (08/30/17 0815)  BP: (!) 185/82 (08/30/17 0815)  SpO2: 98 % (08/30/17 0815) Vital Signs (24h Range):  Temp:  [97.3 °F (36.3 °C)-99.1 °F (37.3 °C)] 98.6 °F (37 °C)  Pulse:  [59-89] 72  Resp:  [18-28] 18  SpO2:  [97 %-100 %] 98 %  BP: (142-203)/() 185/82     Weight: 80.8 kg (178 lb 3.2 oz)  Body mass index is 27.1 kg/m².      Intake/Output Summary (Last 24 hours) at 08/30/17 0840  Last data filed at 08/30/17 0600   Gross per 24 hour   Intake             2330 ml   Output             1000 ml   Net             1330 ml       Physical Exam  No distress, very pleasant  NCAT  EOMI  Moist mucous membranes  No increased work of breathing  Abdomen soft, mildly tender RUQ, no guarding, no distension  No MSK deformity  No rash  No evidence of bleeding or bruising  Normal affect  Alert and oriented    Significant Labs:  CBC:   Recent Labs  Lab 08/30/17  0607   WBC 13.12*   RBC 3.59*   HGB 11.0*   HCT 32.0*      MCV 89   MCH 30.6   MCHC 34.4     CMP:   Recent Labs  Lab 08/29/17  0450   GLU 99   CALCIUM 9.7   ALBUMIN 2.4*   PROT 6.4      K 4.8   CO2 22*      BUN 22   CREATININE 2.0*   ALKPHOS 389*   *   *   BILITOT 3.7*       Significant Diagnostics:  I have reviewed all pertinent  imaging results/findings within the past 24 hours.    Assessment/Plan:   83M with cholangitis, choledocholithiasis, biliary pancreatitis s/p ERCP and stent placement 8/29. Plan per GI for repeat ERCP, sphincterotomy and stone removal later this week.  Plan for surgery to cholecystectomy thereafter, potentially this Friday if ERCP performed prior.  Continue to hold plavix  Continue ABX  Active Diagnoses:    Diagnosis Date Noted POA    PRINCIPAL PROBLEM:  Severe sepsis [A41.9, R65.20] 08/29/2017 Yes    Acute pancreatitis [K85.90] 08/29/2017 Yes    Acute renal failure [N17.9] 08/29/2017 Yes    Gram-negative bacteremia [R78.81] 08/29/2017 Yes    CAD (coronary artery disease) [I25.10] 08/29/2017 Yes     Chronic    Essential hypertension [I10] 08/29/2017 Yes     Chronic    PAD (peripheral artery disease) [I73.9] 08/29/2017 Yes     Chronic    Unilateral inguinal hernia [K40.90] 08/29/2017 Yes     Chronic    Choledocholithiasis [K80.50] 08/28/2017 Yes      Problems Resolved During this Admission:    Diagnosis Date Noted Date Resolved POA       Thank you for your consult. I will follow-up with patient. Please contact us if you have any additional questions.    Marah Rodríguez MD  General Surgery  [facility]

## 2017-08-30 NOTE — NURSING
"Report received; pt awake and alert, on BiPap at this time; pt states that he's "breathing better now"; denies any pain or discomfort at this time; no needs voiced  "

## 2017-08-31 LAB
ALBUMIN SERPL BCP-MCNC: 2.3 G/DL
ALP SERPL-CCNC: 333 U/L
ALT SERPL W/O P-5'-P-CCNC: 121 U/L
ANION GAP SERPL CALC-SCNC: 11 MMOL/L
AST SERPL-CCNC: 33 U/L
BASOPHILS # BLD AUTO: 0.02 K/UL
BASOPHILS NFR BLD: 0.2 %
BILIRUB SERPL-MCNC: 0.8 MG/DL
BUN SERPL-MCNC: 23 MG/DL
CALCIUM SERPL-MCNC: 9.5 MG/DL
CHLORIDE SERPL-SCNC: 113 MMOL/L
CO2 SERPL-SCNC: 19 MMOL/L
CREAT SERPL-MCNC: 1.8 MG/DL
DIFFERENTIAL METHOD: ABNORMAL
EOSINOPHIL # BLD AUTO: 0.1 K/UL
EOSINOPHIL NFR BLD: 1.1 %
ERYTHROCYTE [DISTWIDTH] IN BLOOD BY AUTOMATED COUNT: 14.4 %
EST. GFR  (AFRICAN AMERICAN): 39 ML/MIN/1.73 M^2
EST. GFR  (NON AFRICAN AMERICAN): 34 ML/MIN/1.73 M^2
GLUCOSE SERPL-MCNC: 88 MG/DL
HCT VFR BLD AUTO: 35.8 %
HGB BLD-MCNC: 12.1 G/DL
LYMPHOCYTES # BLD AUTO: 1.4 K/UL
LYMPHOCYTES NFR BLD: 17.1 %
MCH RBC QN AUTO: 30.6 PG
MCHC RBC AUTO-ENTMCNC: 33.8 G/DL
MCV RBC AUTO: 90 FL
MONOCYTES # BLD AUTO: 0.8 K/UL
MONOCYTES NFR BLD: 10 %
NEUTROPHILS # BLD AUTO: 5.8 K/UL
NEUTROPHILS NFR BLD: 71.6 %
PLATELET # BLD AUTO: 399 K/UL
PMV BLD AUTO: 10.4 FL
POTASSIUM SERPL-SCNC: 4.6 MMOL/L
PROT SERPL-MCNC: 6.2 G/DL
RBC # BLD AUTO: 3.96 M/UL
SODIUM SERPL-SCNC: 143 MMOL/L
WBC # BLD AUTO: 8.11 K/UL

## 2017-08-31 PROCEDURE — 25000003 PHARM REV CODE 250: Performed by: EMERGENCY MEDICINE

## 2017-08-31 PROCEDURE — S0028 INJECTION, FAMOTIDINE, 20 MG: HCPCS | Performed by: EMERGENCY MEDICINE

## 2017-08-31 PROCEDURE — 36415 COLL VENOUS BLD VENIPUNCTURE: CPT

## 2017-08-31 PROCEDURE — 12000002 HC ACUTE/MED SURGE SEMI-PRIVATE ROOM

## 2017-08-31 PROCEDURE — 99900035 HC TECH TIME PER 15 MIN (STAT)

## 2017-08-31 PROCEDURE — 63600175 PHARM REV CODE 636 W HCPCS: Performed by: INTERNAL MEDICINE

## 2017-08-31 PROCEDURE — 25000003 PHARM REV CODE 250: Performed by: INTERNAL MEDICINE

## 2017-08-31 PROCEDURE — 80053 COMPREHEN METABOLIC PANEL: CPT

## 2017-08-31 PROCEDURE — 85025 COMPLETE CBC W/AUTO DIFF WBC: CPT

## 2017-08-31 RX ADMIN — LISINOPRIL 20 MG: 20 TABLET ORAL at 07:08

## 2017-08-31 RX ADMIN — SODIUM CHLORIDE: 0.9 INJECTION, SOLUTION INTRAVENOUS at 10:08

## 2017-08-31 RX ADMIN — SODIUM CHLORIDE: 0.9 INJECTION, SOLUTION INTRAVENOUS at 05:08

## 2017-08-31 RX ADMIN — HYDROCHLOROTHIAZIDE 12.5 MG: 12.5 TABLET ORAL at 07:08

## 2017-08-31 RX ADMIN — CARVEDILOL 6.25 MG: 6.25 TABLET, FILM COATED ORAL at 08:08

## 2017-08-31 RX ADMIN — SODIUM CHLORIDE: 0.9 INJECTION, SOLUTION INTRAVENOUS at 02:08

## 2017-08-31 RX ADMIN — AMLODIPINE BESYLATE 10 MG: 5 TABLET ORAL at 07:08

## 2017-08-31 RX ADMIN — CEFTRIAXONE 2 G: 2 INJECTION, SOLUTION INTRAVENOUS at 10:08

## 2017-08-31 RX ADMIN — FAMOTIDINE 20 MG: 10 INJECTION, SOLUTION INTRAVENOUS at 09:08

## 2017-08-31 RX ADMIN — CARVEDILOL 6.25 MG: 6.25 TABLET, FILM COATED ORAL at 07:08

## 2017-08-31 NOTE — SUBJECTIVE & OBJECTIVE
Review of Systems   Constitutional: Negative for appetite change and fever.   HENT: Negative for congestion and rhinorrhea.    Eyes: Negative for photophobia and visual disturbance.   Respiratory: Negative for cough and shortness of breath.    Cardiovascular: Negative for chest pain and leg swelling.   Gastrointestinal: Negative for abdominal pain, nausea and vomiting.   Musculoskeletal: Negative for gait problem and joint swelling.   Skin: Negative for pallor and rash.   Neurological: Negative for weakness and numbness.   Psychiatric/Behavioral: Negative for confusion and dysphoric mood.     Objective:     Vital Signs (Most Recent):  Temp: 98.1 °F (36.7 °C) (08/31/17 1100)  Pulse: 68 (08/31/17 1100)  Resp: 17 (08/31/17 1100)  BP: (!) 140/64 (08/31/17 1100)  SpO2: 97 % (08/31/17 1100) Vital Signs (24h Range):  Temp:  [96.8 °F (36 °C)-98.7 °F (37.1 °C)] 98.1 °F (36.7 °C)  Pulse:  [61-76] 68  Resp:  [17-21] 17  SpO2:  [97 %-98 %] 97 %  BP: (135-198)/(62-97) 140/64     Weight: 79.1 kg (174 lb 6.1 oz)  Body mass index is 26.52 kg/m².    Physical Exam   Constitutional: He is oriented to person, place, and time. He appears well-developed and well-nourished. No distress.   HENT:   Right Ear: External ear normal.   Left Ear: External ear normal.   Nose: Nose normal.   Eyes: EOM are normal. Pupils are equal, round, and reactive to light. No scleral icterus.   Neck: Normal range of motion. Neck supple. No thyromegaly present.   Cardiovascular: Normal rate and normal heart sounds.  Exam reveals no friction rub.    No murmur heard.  Pulmonary/Chest: Effort normal and breath sounds normal. No respiratory distress. He has no wheezes. He has no rales.   Abdominal: Soft. Bowel sounds are normal. He exhibits no distension and no mass. There is no tenderness.   Musculoskeletal: Normal range of motion. He exhibits no edema or deformity.   Neurological: He is alert and oriented to person, place, and time. No cranial nerve deficit.    Skin: Skin is warm and dry. No pallor.   Psychiatric: He has a normal mood and affect. His behavior is normal. Thought content normal.        Significant Labs: All pertinent labs within the past 24 hours have been reviewed.    Significant Imaging: I have reviewed all pertinent imaging results/findings within the past 24 hours.

## 2017-08-31 NOTE — NURSING
Report received; patient is awake and alert resp even and unlabored no acute distress noted. Denies any pain, shortness of breath or discomfort; no needs voiced

## 2017-08-31 NOTE — PROGRESS NOTES
Note that patient inquired about possible GI surgery today.  He was awaiting decision from endoscopy prior to eating. I called Dr. Bates. Also, I left messages for endoscopy x 2. Awaiting communication form endoscopy. Will continue to f/u.

## 2017-08-31 NOTE — PLAN OF CARE
Problem: Infection, Risk/Actual (Adult)  Intervention: Manage Suspected/Actual Infection   08/31/17 0516   Safety Interventions   Isolation Precautions environmental surveillance   Infection Management aseptic technique maintained     Intervention: Prevent Infection/Maximize Resistance   08/31/17 0516   Pain/Comfort/Sleep Interventions   Sleep/Rest Enhancement regular sleep/rest pattern promoted         Comments: Pt admitted for pancreatitis/gallstones; biliary stent placed via ERCP on 8/29; no fever noted this shift; last WBC 13.12; currently receiving Rocephin Iv; denies any pain or discomfort; vital signs checked q 4 hours; s/s infection assessed with patient rounds and PRN

## 2017-08-31 NOTE — PROGRESS NOTES
"Chief Complaint / Reason for Consult: Abd. Pain, Abnormal Imaging    No Abd. Pain, F/C    ROS: No CP, SOB, N/V    Patient Vitals for the past 24 hrs:   BP Temp Temp src Pulse Resp SpO2 Height Weight   08/31/17 1100 (!) 140/64 98.1 °F (36.7 °C) Oral 68 17 97 % - -   08/31/17 0959 (!) 149/67 - - 65 - 98 % - -   08/31/17 0803 (!) 198/97 96.8 °F (36 °C) Oral 67 19 97 % - -   08/31/17 0751 (!) 198/97 - - - - - - -   08/31/17 0715 (!) 198/88 96.8 °F (36 °C) Oral 67 19 97 % 5' 7.99" (1.727 m) 79.1 kg (174 lb 6.1 oz)   08/31/17 0610 (!) 170/80 - - - - - - -   08/31/17 0400 (!) 195/77 98.6 °F (37 °C) Oral 62 (!) 21 - - 79.1 kg (174 lb 6.1 oz)   08/30/17 2334 (!) 187/89 98.5 °F (36.9 °C) Oral 66 19 - - -   08/30/17 2000 (!) 183/77 98.7 °F (37.1 °C) Oral 62 18 97 % - -   08/30/17 1920 - - - 61 - - - -   08/30/17 1658 135/62 97 °F (36.1 °C) Oral 76 18 97 % - -       Physical Exam:  Gen - Well developed, well nourished, no apparent distress  HEENT - Anicteric  CV - S1, S2, no murmurs/rubs  Lungs - CTA-B, normal excursion  Abd - Soft, NT, ND, normal BS's, no HSM.  Ext - No c/c/e  Neuro - No asterixis    Labs:    Recent Labs  Lab 08/31/17  0442   WBC 8.11   RBC 3.96*   HGB 12.1*   HCT 35.8*   *   MCV 90   MCH 30.6   MCHC 33.8       Recent Labs  Lab 08/31/17 0442   CALCIUM 9.5   PROT 6.2      K 4.6   CO2 19*   *   BUN 23   CREATININE 1.8*   ALKPHOS 333*   *   AST 33   BILITOT 0.8       Imaging:  Reviewed CT    Assessment:  This patient is a 83 y.o. male with:   1. Cholangitis / Choledocholithiasis - s/p ERCP with stent placement (On Plavix), improved.    2. Acute pancreatitis, biliary - Clinically improved  3. Abnormal LFT's - secondary to #1/2, improved  4. HTN, CAD (Plavix on hold), PAD.....    Recommendations:  1. Monitor for sx's.  2. NPO p MN.  3. ERCP tomorrow for stent removal, sphincterotomy, stone extraction, balloon sweep.  4. Gen Surg following, to evaluate for cholecystectomy.    "

## 2017-08-31 NOTE — PROGRESS NOTES
Note that patient given BP meds prior to scheduled time as BP elevated at 198/97. Patient denies HA/CP. He is concerned about whether he will have surgery today. Will call M. D.

## 2017-08-31 NOTE — PROGRESS NOTES
[facility] Hospital Medicine  Progress Note    Patient Name: Mandeep Guy  MRN: [id number]  Patient Class: IP- Inpatient   Admission Date: 8/28/2017  Length of Stay: 3 days  Attending Physician: Ian Bates MD  Primary Care Provider: Giancarlo Teran MD        Subjective:     Principal Problem:Severe sepsis    HPI:  Mr. Guy is a pleasant 84 yo man with HTN, CAD, PAD s/p stent on chronic anticoagulation, and a right inguinal hernia who presented for 5 days of intermittent abdominal pain. The pain was diffuse but he reported mostly in the lower abdomen. He described it as sharp. Eating made it worse. He denied nausea and vomiting. He also reported difficulty urinating and fevers.    Hospital Course:  In the ER he was found to have severe sepsis with leukocytosis and fevers, likely source being intra-abdominal infection, as well as ARF. Empiric abx with ertapenam and IV fluids were started. LFTs were elevated consistent with choledocholithiasis, cholangitis, and biliary pancreatitis. US showed cholelithiasis without e/o gallbladder wall thickening but an enlarged CBD to 1.8cm. GI took for ERCP 8/29/2917 and placed a stent. Plan for GI for repeat ERCP, sphincterotomy and stone removal later this week and then surgery for cholecystectomy thereafter. Continue abx. Plavix held since Monday.    Review of Systems   Constitutional: Negative for appetite change and fever.   HENT: Negative for congestion and rhinorrhea.    Eyes: Negative for photophobia and visual disturbance.   Respiratory: Negative for cough and shortness of breath.    Cardiovascular: Negative for chest pain and leg swelling.   Gastrointestinal: Negative for abdominal pain, nausea and vomiting.   Musculoskeletal: Negative for gait problem and joint swelling.   Skin: Negative for pallor and rash.   Neurological: Negative for weakness and numbness.   Psychiatric/Behavioral: Negative for confusion and dysphoric mood.     Objective:      Vital Signs (Most Recent):  Temp: 98.1 °F (36.7 °C) (08/31/17 1100)  Pulse: 68 (08/31/17 1100)  Resp: 17 (08/31/17 1100)  BP: (!) 140/64 (08/31/17 1100)  SpO2: 97 % (08/31/17 1100) Vital Signs (24h Range):  Temp:  [96.8 °F (36 °C)-98.7 °F (37.1 °C)] 98.1 °F (36.7 °C)  Pulse:  [61-76] 68  Resp:  [17-21] 17  SpO2:  [97 %-98 %] 97 %  BP: (135-198)/(62-97) 140/64     Weight: 79.1 kg (174 lb 6.1 oz)  Body mass index is 26.52 kg/m².    Physical Exam   Constitutional: He is oriented to person, place, and time. He appears well-developed and well-nourished. No distress.   HENT:   Right Ear: External ear normal.   Left Ear: External ear normal.   Nose: Nose normal.   Eyes: EOM are normal. Pupils are equal, round, and reactive to light. No scleral icterus.   Neck: Normal range of motion. Neck supple. No thyromegaly present.   Cardiovascular: Normal rate and normal heart sounds.  Exam reveals no friction rub.    No murmur heard.  Pulmonary/Chest: Effort normal and breath sounds normal. No respiratory distress. He has no wheezes. He has no rales.   Abdominal: Soft. Bowel sounds are normal. He exhibits no distension and no mass. There is no tenderness.   Musculoskeletal: Normal range of motion. He exhibits no edema or deformity.   Neurological: He is alert and oriented to person, place, and time. No cranial nerve deficit.   Skin: Skin is warm and dry. No pallor.   Psychiatric: He has a normal mood and affect. His behavior is normal. Thought content normal.        Significant Labs: All pertinent labs within the past 24 hours have been reviewed.    Significant Imaging: I have reviewed all pertinent imaging results/findings within the past 24 hours.    Assessment/Plan:      * Severe sepsis    Blood Cx with E coli sensitive to ceftriaxone and ertapenam. Likely 2/2 intra-abdominal source. Improved.        Gram-negative bacteremia    As above        Choledocholithiasis    US showed cholelithiasis without e/o gallbladder wall  thickening but an enlarged CBD to 1.8cm. GI took for ERCP 8/29/2917 and placed a stent. Plan for GI for repeat ERCP, sphincterotomy and stone removal later this week and then surgery for cholecystectomy thereafter. Plavix held since Monday.        Acute biliary pancreatitis    2/2 above. Diet advanced. Pain resolved.        Acute renal failure    Fluids and monitor. 2/2 sepsis. Improved.        Unilateral inguinal hernia    Reportedly going to have a repair by Dr. Ashby on the day of presentation. CT without apparent issue at this time. Follow up when acute issue resolved.        PAD (peripheral artery disease)    Plavix on hold until after procedures. No acute issue.        Essential hypertension    Restarted lisinopril, coreg, and amlodipine. No acute issue. PRN hydralazine.        CAD (coronary artery disease)    Cont BB and ACEi. Hold statin in the setting of elevated LFTs. OAC on hold until post-procedures. No acute issue.          VTE Risk Mitigation         Ordered     Medium Risk of VTE  Once      08/28/17 2351     Place sequential compression device  Until discontinued      08/28/17 2351     Place PRATEEK hose  Until discontinued      08/28/17 2351     Reason for No Pharmacological VTE Prophylaxis  Once      08/28/17 2351              Ina Bates MD  Department of Hospital Medicine   [facility]

## 2017-08-31 NOTE — PHYSICIAN QUERY
PT Name: Mandeep Guy  MR #: [id number]    Physician Query Form - Cause and Effect Relationship Clarification      CDS/: Sandy Osorio               Contact information: [email or web address]    This form is a permanent document in the medical record.     Query Date: August 31, 2017    By submitting this query, we are merely seeking further clarification of documentation. Please utilize your independent clinical judgment when addressing the question(s) below.    The Medical record contains the following:  Supporting Clinical Findings   Location in record    Found to have severe sepsis with leukocytosis and fevers, likely source being intra-abdominal infection      Severe sepsis Blood Cx with E coli    Gram-negative bacteremia                          Escherichia coli                                                                                                                                                                    [facility] Medicine PN 08/30                Blood Cultures 08/28                                                                                                                                                                                                       Provider, please clarify if there is any correlation between __Severe Sepsis____ and ___Escherichia coli__.           Are the conditions:     [ x ] Due to or associated with each other     [  ] Unrelated to each other     [  ] Other (Please Specify): _________________________     [  ] Clinically Undetermined

## 2017-08-31 NOTE — ASSESSMENT & PLAN NOTE
US showed cholelithiasis without e/o gallbladder wall thickening but an enlarged CBD to 1.8cm. GI took for ERCP 8/29/2917 and placed a stent. Plan for GI for repeat ERCP, sphincterotomy and stone removal later this week and then surgery for cholecystectomy thereafter. Plavix held since Monday.

## 2017-08-31 NOTE — PROGRESS NOTES
[facility]  General Surgery  Progress Note    Subjective:     Interval History: s/p ERCP and stent. Tolerating diet, denies pain, feeling well.     Post-Op Info:  Procedure(s) (LRB):  ERCP (N/A)   2 Days Post-Op      Medications:  Continuous Infusions:   sodium chloride 0.9% 150 mL/hr at 08/31/17 1009     Scheduled Meds:   amlodipine  10 mg Oral Daily    carvedilol  6.25 mg Oral BID    cefTRIAXone (ROCEPHIN) IVPB  2 g Intravenous Q24H    famotidine (PF)  20 mg Intravenous Daily    hydrochlorothiazide  12.5 mg Oral Daily    lisinopril  20 mg Oral Daily     PRN Meds:acetaminophen, hydrALAZINE, HYDROmorphone, morphine, omnipaque 300 iohexol, ondansetron, promethazine (PHENERGAN) IVPB, racepinephrine     Objective:     Vital Signs (Most Recent):  Temp: 98.1 °F (36.7 °C) (08/31/17 1100)  Pulse: 68 (08/31/17 1100)  Resp: 17 (08/31/17 1100)  BP: (!) 140/64 (08/31/17 1100)  SpO2: 97 % (08/31/17 1100) Vital Signs (24h Range):  Temp:  [96.8 °F (36 °C)-98.7 °F (37.1 °C)] 98.1 °F (36.7 °C)  Pulse:  [61-76] 68  Resp:  [17-21] 17  SpO2:  [97 %-98 %] 97 %  BP: (135-198)/(62-97) 140/64       Intake/Output Summary (Last 24 hours) at 08/31/17 1539  Last data filed at 08/31/17 1220   Gross per 24 hour   Intake             1737 ml   Output              726 ml   Net             1011 ml       Physical Exam  No distress  Abdomen soft, non tender, non distended    Significant Labs:  CBC:   Recent Labs  Lab 08/31/17  0442   WBC 8.11   RBC 3.96*   HGB 12.1*   HCT 35.8*   *   MCV 90   MCH 30.6   MCHC 33.8     CMP:   Recent Labs  Lab 08/31/17  0442   GLU 88   CALCIUM 9.5   ALBUMIN 2.3*   PROT 6.2      K 4.6   CO2 19*   *   BUN 23   CREATININE 1.8*   ALKPHOS 333*   *   AST 33   BILITOT 0.8       Significant Diagnostics:  I have reviewed all pertinent imaging results/findings within the past 24 hours.    Assessment/Plan:   83M with cholangitis, choledocholithiasis, biliary pancreatitis s/p ERCP  and stent placement 8/29. Plan per GI for repeat ERCP, sphincterotomy and stone removal tomorrow  Continue to hold plavix  Continue ABX  Possible lap kaushal tomorrow, continue NPO post ERCP, will consent  Active Diagnoses:    Diagnosis Date Noted POA    PRINCIPAL PROBLEM:  Severe sepsis [A41.9, R65.20] 08/29/2017 Yes    Acute biliary pancreatitis [K85.10] 08/29/2017 Yes    Acute renal failure [N17.9] 08/29/2017 Yes    Gram-negative bacteremia [R78.81] 08/29/2017 Yes    CAD (coronary artery disease) [I25.10] 08/29/2017 Yes     Chronic    Essential hypertension [I10] 08/29/2017 Yes     Chronic    PAD (peripheral artery disease) [I73.9] 08/29/2017 Yes     Chronic    Unilateral inguinal hernia [K40.90] 08/29/2017 Yes     Chronic    Choledocholithiasis [K80.50] 08/28/2017 Yes      Problems Resolved During this Admission:    Diagnosis Date Noted Date Resolved POA         Marah Rodríguez MD  General Surgery  [facility]

## 2017-09-01 ENCOUNTER — ANESTHESIA EVENT (OUTPATIENT)
Dept: ENDOSCOPY | Facility: HOSPITAL | Age: 82
DRG: 871 | End: 2017-09-01
Payer: MEDICARE

## 2017-09-01 ENCOUNTER — ANESTHESIA (OUTPATIENT)
Dept: ENDOSCOPY | Facility: HOSPITAL | Age: 82
DRG: 871 | End: 2017-09-01
Payer: MEDICARE

## 2017-09-01 LAB
ALBUMIN SERPL BCP-MCNC: 2.3 G/DL
ALP SERPL-CCNC: 256 U/L
ALT SERPL W/O P-5'-P-CCNC: 74 U/L
ANION GAP SERPL CALC-SCNC: 11 MMOL/L
AST SERPL-CCNC: 21 U/L
BASOPHILS # BLD AUTO: 0.02 K/UL
BASOPHILS NFR BLD: 0.3 %
BILIRUB SERPL-MCNC: 0.7 MG/DL
BUN SERPL-MCNC: 20 MG/DL
CALCIUM SERPL-MCNC: 8.8 MG/DL
CHLORIDE SERPL-SCNC: 117 MMOL/L
CO2 SERPL-SCNC: 15 MMOL/L
CREAT SERPL-MCNC: 1.5 MG/DL
DIFFERENTIAL METHOD: ABNORMAL
EOSINOPHIL # BLD AUTO: 0.1 K/UL
EOSINOPHIL NFR BLD: 1.7 %
ERYTHROCYTE [DISTWIDTH] IN BLOOD BY AUTOMATED COUNT: 14.3 %
EST. GFR  (AFRICAN AMERICAN): 49 ML/MIN/1.73 M^2
EST. GFR  (NON AFRICAN AMERICAN): 42 ML/MIN/1.73 M^2
GLUCOSE SERPL-MCNC: 78 MG/DL
HCT VFR BLD AUTO: 31.1 %
HGB BLD-MCNC: 10.8 G/DL
LYMPHOCYTES # BLD AUTO: 1.3 K/UL
LYMPHOCYTES NFR BLD: 18.4 %
MCH RBC QN AUTO: 30.6 PG
MCHC RBC AUTO-ENTMCNC: 34.7 G/DL
MCV RBC AUTO: 88 FL
MONOCYTES # BLD AUTO: 0.8 K/UL
MONOCYTES NFR BLD: 10.5 %
NEUTROPHILS # BLD AUTO: 5 K/UL
NEUTROPHILS NFR BLD: 69.1 %
PLATELET # BLD AUTO: 353 K/UL
PMV BLD AUTO: 10.5 FL
POTASSIUM SERPL-SCNC: 4.5 MMOL/L
PROT SERPL-MCNC: 5.3 G/DL
RBC # BLD AUTO: 3.53 M/UL
SODIUM SERPL-SCNC: 143 MMOL/L
WBC # BLD AUTO: 7.21 K/UL

## 2017-09-01 PROCEDURE — 25000003 PHARM REV CODE 250: Performed by: EMERGENCY MEDICINE

## 2017-09-01 PROCEDURE — 37000009 HC ANESTHESIA EA ADD 15 MINS: Performed by: INTERNAL MEDICINE

## 2017-09-01 PROCEDURE — 25000003 PHARM REV CODE 250: Performed by: INTERNAL MEDICINE

## 2017-09-01 PROCEDURE — 94761 N-INVAS EAR/PLS OXIMETRY MLT: CPT

## 2017-09-01 PROCEDURE — G8989 SELF CARE D/C STATUS: HCPCS | Mod: CI

## 2017-09-01 PROCEDURE — 37000008 HC ANESTHESIA 1ST 15 MINUTES: Performed by: INTERNAL MEDICINE

## 2017-09-01 PROCEDURE — S0028 INJECTION, FAMOTIDINE, 20 MG: HCPCS | Performed by: EMERGENCY MEDICINE

## 2017-09-01 PROCEDURE — 36415 COLL VENOUS BLD VENIPUNCTURE: CPT

## 2017-09-01 PROCEDURE — 12000002 HC ACUTE/MED SURGE SEMI-PRIVATE ROOM

## 2017-09-01 PROCEDURE — 63600175 PHARM REV CODE 636 W HCPCS: Performed by: EMERGENCY MEDICINE

## 2017-09-01 PROCEDURE — 85025 COMPLETE CBC W/AUTO DIFF WBC: CPT

## 2017-09-01 PROCEDURE — 63600175 PHARM REV CODE 636 W HCPCS: Performed by: INTERNAL MEDICINE

## 2017-09-01 PROCEDURE — 97165 OT EVAL LOW COMPLEX 30 MIN: CPT

## 2017-09-01 PROCEDURE — 63600175 PHARM REV CODE 636 W HCPCS: Performed by: NURSE ANESTHETIST, CERTIFIED REGISTERED

## 2017-09-01 PROCEDURE — G8987 SELF CARE CURRENT STATUS: HCPCS | Mod: CI

## 2017-09-01 PROCEDURE — 80053 COMPREHEN METABOLIC PANEL: CPT

## 2017-09-01 PROCEDURE — G8988 SELF CARE GOAL STATUS: HCPCS | Mod: CI

## 2017-09-01 PROCEDURE — 97535 SELF CARE MNGMENT TRAINING: CPT

## 2017-09-01 RX ORDER — GLUCAGON 1 MG
KIT INJECTION
Status: DISCONTINUED
Start: 2017-09-01 | End: 2017-09-01 | Stop reason: WASHOUT

## 2017-09-01 RX ORDER — HYDROCHLOROTHIAZIDE 25 MG/1
25 TABLET ORAL DAILY
Status: DISCONTINUED | OUTPATIENT
Start: 2017-09-02 | End: 2017-09-02 | Stop reason: HOSPADM

## 2017-09-01 RX ORDER — WATER FOR INJECTION,STERILE
VIAL (ML) INJECTION
Status: DISCONTINUED
Start: 2017-09-01 | End: 2017-09-01 | Stop reason: WASHOUT

## 2017-09-01 RX ORDER — CARVEDILOL 6.25 MG/1
12.5 TABLET ORAL 2 TIMES DAILY
Status: DISCONTINUED | OUTPATIENT
Start: 2017-09-01 | End: 2017-09-02 | Stop reason: HOSPADM

## 2017-09-01 RX ORDER — SUCCINYLCHOLINE CHLORIDE 20 MG/ML
INJECTION INTRAMUSCULAR; INTRAVENOUS
Status: DISCONTINUED
Start: 2017-09-01 | End: 2017-09-01 | Stop reason: WASHOUT

## 2017-09-01 RX ORDER — INDOMETHACIN 50 MG/1
100 SUPPOSITORY RECTAL EVERY 12 HOURS PRN
Status: DISCONTINUED | OUTPATIENT
Start: 2017-09-01 | End: 2017-09-01 | Stop reason: HOSPADM

## 2017-09-01 RX ORDER — PROPOFOL 10 MG/ML
VIAL (ML) INTRAVENOUS
Status: DISCONTINUED
Start: 2017-09-01 | End: 2017-09-01 | Stop reason: WASHOUT

## 2017-09-01 RX ORDER — HYDRALAZINE HYDROCHLORIDE 20 MG/ML
INJECTION INTRAMUSCULAR; INTRAVENOUS
Status: DISCONTINUED
Start: 2017-09-01 | End: 2017-09-01 | Stop reason: WASHOUT

## 2017-09-01 RX ORDER — METOPROLOL TARTRATE 1 MG/ML
INJECTION, SOLUTION INTRAVENOUS
Status: DISCONTINUED
Start: 2017-09-01 | End: 2017-09-01 | Stop reason: WASHOUT

## 2017-09-01 RX ORDER — SODIUM CHLORIDE, SODIUM LACTATE, POTASSIUM CHLORIDE, CALCIUM CHLORIDE 600; 310; 30; 20 MG/100ML; MG/100ML; MG/100ML; MG/100ML
INJECTION, SOLUTION INTRAVENOUS
Status: COMPLETED | OUTPATIENT
Start: 2017-09-01 | End: 2017-09-01

## 2017-09-01 RX ORDER — NEOSTIGMINE METHYLSULFATE 1 MG/ML
INJECTION, SOLUTION INTRAVENOUS
Status: DISCONTINUED
Start: 2017-09-01 | End: 2017-09-01 | Stop reason: WASHOUT

## 2017-09-01 RX ORDER — GLYCOPYRROLATE 0.2 MG/ML
INJECTION INTRAMUSCULAR; INTRAVENOUS
Status: DISCONTINUED
Start: 2017-09-01 | End: 2017-09-01 | Stop reason: WASHOUT

## 2017-09-01 RX ORDER — LIDOCAINE HYDROCHLORIDE 20 MG/ML
INJECTION, SOLUTION EPIDURAL; INFILTRATION; INTRACAUDAL; PERINEURAL
Status: DISCONTINUED
Start: 2017-09-01 | End: 2017-09-01 | Stop reason: WASHOUT

## 2017-09-01 RX ORDER — ROCURONIUM BROMIDE 10 MG/ML
INJECTION, SOLUTION INTRAVENOUS
Status: DISCONTINUED
Start: 2017-09-01 | End: 2017-09-01 | Stop reason: WASHOUT

## 2017-09-01 RX ORDER — ONDANSETRON 2 MG/ML
INJECTION INTRAMUSCULAR; INTRAVENOUS
Status: DISCONTINUED
Start: 2017-09-01 | End: 2017-09-01 | Stop reason: WASHOUT

## 2017-09-01 RX ORDER — LABETALOL HYDROCHLORIDE 5 MG/ML
INJECTION, SOLUTION INTRAVENOUS
Status: DISCONTINUED
Start: 2017-09-01 | End: 2017-09-01 | Stop reason: WASHOUT

## 2017-09-01 RX ORDER — FENTANYL CITRATE 50 UG/ML
INJECTION, SOLUTION INTRAMUSCULAR; INTRAVENOUS
Status: DISCONTINUED | OUTPATIENT
Start: 2017-09-01 | End: 2017-09-01

## 2017-09-01 RX ORDER — HYDRALAZINE HYDROCHLORIDE 20 MG/ML
10 INJECTION INTRAMUSCULAR; INTRAVENOUS EVERY 4 HOURS PRN
Status: DISCONTINUED | OUTPATIENT
Start: 2017-09-01 | End: 2017-09-02 | Stop reason: HOSPADM

## 2017-09-01 RX ORDER — ESMOLOL HYDROCHLORIDE 10 MG/ML
INJECTION INTRAVENOUS
Status: DISCONTINUED
Start: 2017-09-01 | End: 2017-09-01 | Stop reason: WASHOUT

## 2017-09-01 RX ORDER — FENTANYL CITRATE 50 UG/ML
INJECTION, SOLUTION INTRAMUSCULAR; INTRAVENOUS
Status: DISPENSED
Start: 2017-09-01 | End: 2017-09-02

## 2017-09-01 RX ADMIN — SODIUM CHLORIDE, SODIUM LACTATE, POTASSIUM CHLORIDE, AND CALCIUM CHLORIDE: .6; .31; .03; .02 INJECTION, SOLUTION INTRAVENOUS at 02:09

## 2017-09-01 RX ADMIN — CARVEDILOL 12.5 MG: 6.25 TABLET, FILM COATED ORAL at 10:09

## 2017-09-01 RX ADMIN — ONDANSETRON 4 MG: 2 INJECTION INTRAMUSCULAR; INTRAVENOUS at 07:09

## 2017-09-01 RX ADMIN — AMLODIPINE BESYLATE 10 MG: 5 TABLET ORAL at 07:09

## 2017-09-01 RX ADMIN — SODIUM CHLORIDE: 0.9 INJECTION, SOLUTION INTRAVENOUS at 05:09

## 2017-09-01 RX ADMIN — HYDRALAZINE HYDROCHLORIDE 10 MG: 20 INJECTION INTRAMUSCULAR; INTRAVENOUS at 08:09

## 2017-09-01 RX ADMIN — FAMOTIDINE 20 MG: 10 INJECTION, SOLUTION INTRAVENOUS at 07:09

## 2017-09-01 RX ADMIN — SODIUM CHLORIDE: 0.9 INJECTION, SOLUTION INTRAVENOUS at 11:09

## 2017-09-01 RX ADMIN — LISINOPRIL 20 MG: 20 TABLET ORAL at 07:09

## 2017-09-01 RX ADMIN — FENTANYL CITRATE 100 MCG: 50 INJECTION INTRAMUSCULAR; INTRAVENOUS at 03:09

## 2017-09-01 RX ADMIN — HYDROCHLOROTHIAZIDE 12.5 MG: 12.5 TABLET ORAL at 07:09

## 2017-09-01 RX ADMIN — CEFTRIAXONE 2 G: 2 INJECTION, SOLUTION INTRAVENOUS at 09:09

## 2017-09-01 RX ADMIN — SODIUM CHLORIDE: 0.9 INJECTION, SOLUTION INTRAVENOUS at 08:09

## 2017-09-01 RX ADMIN — CARVEDILOL 6.25 MG: 6.25 TABLET, FILM COATED ORAL at 07:09

## 2017-09-01 NOTE — PROGRESS NOTES
"Chief Complaint / Reason for Consult: Abd. Pain, Abnormal Imaging    No acute events overnight    ROS: No CP, SOB, F/C    Patient Vitals for the past 24 hrs:   BP Temp Temp src Pulse Resp SpO2 Height Weight   09/01/17 1433 117/82 - - - - - - -   09/01/17 1430 (!) 177/82 - - - - - - -   09/01/17 1423 (!) 177/82 98.1 °F (36.7 °C) Oral 63 18 99 % - -   09/01/17 1313 136/65 - - 63 - - - -   09/01/17 1100 (!) 188/82 98.2 °F (36.8 °C) Oral 68 20 98 % - -   09/01/17 1000 - - - 65 18 98 % - -   09/01/17 0945 (!) 177/81 - - 63 - - - -   09/01/17 0915 (!) 169/77 - - 65 - - - -   09/01/17 0839 (!) 220/91 - - - - - - -   09/01/17 0736 (!) 207/95 - - 66 - - - -   09/01/17 0715 (!) 204/92 - - - - - - -   09/01/17 0700 (!) 207/95 98.1 °F (36.7 °C) Oral 71 18 99 % 5' 7.99" (1.727 m) 79.1 kg (174 lb 6.1 oz)   09/01/17 0400 (!) 162/86 98.7 °F (37.1 °C) Oral 61 18 - - 79.1 kg (174 lb 6.1 oz)   09/01/17 0000 (!) 158/94 97.8 °F (36.6 °C) Oral 62 18 - - -   08/31/17 2056 - - - 60 - - - -   08/31/17 2007 - - - 68 - 98 % - -   08/31/17 2000 (!) 172/74 98.5 °F (36.9 °C) Oral (!) 58 19 97 % - -   08/31/17 1608 (!) 168/74 97.9 °F (36.6 °C) Oral 62 18 97 % - -       Physical Exam:  Gen - Well developed, well nourished, no apparent distress  HEENT - Anicteric  CV - S1, S2, no murmurs/rubs  Lungs - CTA-B, normal excursion  Abd - Soft, NT, ND, normal BS's, no HSM.  Ext - No c/c/e  Neuro - No asterixis    Labs:    Recent Labs  Lab 09/01/17  0507   WBC 7.21   RBC 3.53*   HGB 10.8*   HCT 31.1*   *   MCV 88   MCH 30.6   MCHC 34.7       Recent Labs  Lab 09/01/17  0507   CALCIUM 8.8   PROT 5.3*      K 4.5   CO2 15*   *   BUN 20   CREATININE 1.5*   ALKPHOS 256*   ALT 74*   AST 21   BILITOT 0.7       Imaging:  Reviewed CT    Assessment:  This patient is a 83 y.o. male with:   1. Cholangitis / Choledocholithiasis - s/p ERCP with stent placement (On Plavix), improved.    2. Acute pancreatitis, biliary - Clinically improved  3. Abnormal " LFT's - secondary to #1/2, improved  4. HTN, CAD (Plavix on hold), PAD.....    Recommendations:  1. Monitor for sx's.  2. Follow LFT's.  3. ERCP for stent removal, sphincterotomy, stone extraction, balloon sweep, once BP is stable.  4. Gen Surg following, to evaluate for cholecystectomy.

## 2017-09-01 NOTE — ASSESSMENT & PLAN NOTE
US showed cholelithiasis without e/o gallbladder wall thickening but an enlarged CBD to 1.8cm. GI took for ERCP 8/29/2917 and placed a stent. Plan for GI for repeat ERCP, sphincterotomy and stone removal 9/1/2017 and then surgery for cholecystectomy thereafter. Plavix held since Monday.

## 2017-09-01 NOTE — PROGRESS NOTES
[facility]  General Surgery  Progress Note    Subjective:     Interval History: plan for ERCP today.     Post-Op Info:  Procedure(s) (LRB):  ERCP (N/A)   3 Days Post-Op      Medications:  Continuous Infusions:   sodium chloride 0.9% 150 mL/hr at 09/01/17 0842     Scheduled Meds:   amlodipine  10 mg Oral Daily    carvedilol  6.25 mg Oral BID    cefTRIAXone (ROCEPHIN) IVPB  2 g Intravenous Q24H    famotidine (PF)  20 mg Intravenous Daily    glucagon (human recombinant)        hydrochlorothiazide  12.5 mg Oral Daily    lisinopril  20 mg Oral Daily    omnipaque 300 iohexol         PRN Meds:acetaminophen, hydrALAZINE, HYDROmorphone, morphine, omnipaque 300 iohexol, ondansetron, promethazine (PHENERGAN) IVPB, racepinephrine     Objective:     Vital Signs (Most Recent):  Temp: 98.2 °F (36.8 °C) (09/01/17 1100)  Pulse: 63 (09/01/17 1313)  Resp: 20 (09/01/17 1100)  BP: 136/65 (09/01/17 1313)  SpO2: 98 % (09/01/17 1100) Vital Signs (24h Range):  Temp:  [97.8 °F (36.6 °C)-98.7 °F (37.1 °C)] 98.2 °F (36.8 °C)  Pulse:  [58-71] 63  Resp:  [18-20] 20  SpO2:  [97 %-99 %] 98 %  BP: (136-220)/(65-95) 136/65       Intake/Output Summary (Last 24 hours) at 09/01/17 1357  Last data filed at 09/01/17 1238   Gross per 24 hour   Intake           3517.5 ml   Output             3175 ml   Net            342.5 ml       Physical Exam  No distress  Abdomen soft  Significant Labs:  CBC:   Recent Labs  Lab 09/01/17  0507   WBC 7.21   RBC 3.53*   HGB 10.8*   HCT 31.1*   *   MCV 88   MCH 30.6   MCHC 34.7     CMP:   Recent Labs  Lab 09/01/17  0507   GLU 78   CALCIUM 8.8   ALBUMIN 2.3*   PROT 5.3*      K 4.5   CO2 15*   *   BUN 20   CREATININE 1.5*   ALKPHOS 256*   ALT 74*   AST 21   BILITOT 0.7       Significant Diagnostics:  None    Assessment/Plan:   83M with cholangitis, choledocholithiasis, biliary pancreatitis s/p ERCP and stent placement 8/29. Plan per GI for repeat ERCP, sphincterotomy and stone  removal today  Continue to hold plavix  Continue ABX  Possible lap kaushal today vs this weekend, continue NPO post ERCP today  Active Diagnoses:    Diagnosis Date Noted POA    PRINCIPAL PROBLEM:  Severe sepsis [A41.9, R65.20] 08/29/2017 Yes    Acute biliary pancreatitis [K85.10] 08/29/2017 Yes    Acute renal failure [N17.9] 08/29/2017 Yes    Gram-negative bacteremia [R78.81] 08/29/2017 Yes    CAD (coronary artery disease) [I25.10] 08/29/2017 Yes     Chronic    Essential hypertension [I10] 08/29/2017 Yes     Chronic    PAD (peripheral artery disease) [I73.9] 08/29/2017 Yes     Chronic    Unilateral inguinal hernia [K40.90] 08/29/2017 Yes     Chronic    Choledocholithiasis [K80.50] 08/28/2017 Yes      Problems Resolved During this Admission:    Diagnosis Date Noted Date Resolved POA         Marah Rodríguez MD  General Surgery  [facility]

## 2017-09-01 NOTE — PLAN OF CARE
08/31/17 2007   Patient Assessment/Suction   Level of Consciousness (AVPU) alert   Respiratory Effort Normal;Unlabored   Expansion/Accessory Muscles/Retractions no use of accessory muscles   All Lung Fields Breath Sounds clear;equal bilaterally   PRE-TX-O2-ETCO2   O2 Device (Oxygen Therapy) room air   SpO2 98 %   Pulse 68   Aerosol Therapy   $ Aerosol Therapy Charges PRN treatment not required   Respiratory Treatment Status PRN treatment not required   Pt tolerates RA well. No PRN treatment required at this time.

## 2017-09-01 NOTE — PT/OT/SLP PROGRESS
Physical Therapy      Mandeep Guy  MRN: [id number]    Patient not seen today for PT eval secondary to Unavailable (per nurse Valorie, pt off unit for ERCP). Will follow-up tomorrow.    Shandra Castellon, PT

## 2017-09-01 NOTE — PROGRESS NOTES
Note that I received contact from Theodora in endoscopy stating that ERCP had been cancelled due to elevated Bp.  Patient sent back to the his room.   I called Dr. Rubin ([phone]) to determine if patient could resume his diet. Order for patient to resume cardiac diet. Will continue to f/u.

## 2017-09-01 NOTE — PT/OT/SLP EVAL
Occupational Therapy  Evaluation/Discharge    Mandeep Guy   MRN: [id number]   Admitting Diagnosis: Severe sepsis    OT Date of Treatment: 09/01/17   OT Start Time: 1100  OT Stop Time: 1127  OT Total Time (min): 27 min    Billable Minutes:  Evaluation 15  Self Care/Home Management 13    Diagnosis: Severe sepsis       Past Medical History:   Diagnosis Date    Anticoagulant long-term use     Coronary artery disease     GERD (gastroesophageal reflux disease)     Hypertension     PAD (peripheral artery disease)     s/p stent      Past Surgical History:   Procedure Laterality Date    HERNIA REPAIR      stents         Referring physician: Jana  Date referred to OT: 9/1/17    General Precautions: Standard, fall  Orthopedic Precautions: N/A  Braces: N/A          Patient History:  Living Environment  Lives With: alone  Living Arrangements: apartment, independent living facility  Home Accessibility:  (Pt has access to elevator secodary to apt being on 4th floopr)  Transportation Available: car, family or friend will provide  Living Environment Comment:  (Pt states that he has been able to take care of self. Pt goes to grocery and cooks food but has been forgetful with food cooking on stove)  Equipment Currently Used at Home: none    Prior level of function:   Bed Mobility/Transfers: independent  Grooming: independent  Bathing: independent  Upper Body Dressing: independent  Lower Body Dressing: independent  Toileting: independent  Home Management Skills: independent  Homemaking Responsibilities: Yes  Driving License: Yes  Occupation: Retired     Dominant hand: right    Subjective:  Communicated with nurse Eugene prior to session.    Chief Complaint: to get procedure done  Patient/Family stated goals: to get help with meals (has been going to [place])    Pain/Comfort  Pain Rating 1: 0/10  Pain Rating Post-Intervention 2: 0/10    Objective:  Patient found with: peripheral IV    Cognitive Exam:  Oriented to: Person,  Place, Time and Situation  Follows Commands/attention: Follows multistep  commands  Communication: clear/fluent  Memory:  No Deficits noted  Safety awareness/insight to disability: intact  Coping skills/emotional control: Appropriate to situation    Visual/perceptual:  Intact      Physical Exam:  Postural examination/scapula alignment: No postural abnormalities identified  Skin integrity: Visible skin intact  Edema: None noted     Sensation:   WFL    Upper Extremity Range of Motion:  Right Upper Extremity: WFL  Left Upper Extremity: WFL    Upper Extremity Strength:  Right Upper Extremity: WFL  Left Upper Extremity: WFL   Strength: good    Fine motor coordination:   Intact    Gross motor coordination: WFL    Functional Mobility:  Bed Mobility:  Rolling/Turning to Left: Independent  Rolling/Turning Right: Independent  Scooting/Bridging: Independent  Supine to Sit: Independent  Sit to Supine: Independent    Transfers:  Sit <> Stand Assistance: Independent  Sit <> Stand Assistive Device: No Assistive Device  Bed <> Chair Technique: Stand Pivot  Bed <> Chair Transfer Assistance: Independent  Bed <> Chair Assistive Device: No Assistive Device    Functional Ambulation: Pt able to ambulate to bedside with IV pole with no assistance (independent)    Activities of Daily Living:    UE dressing with Level of assistance Independent  LE Dressing Level of Assistance: Independent  Grooming Position: Standing  Toileting Where Assessed:  (Pty and nurse state that he has gone to bathroom with asssitive)          Balance:   Static Sit: NORMAL: No deviations seen in posture held statically  Dynamic Sit: NORMAL: No deviations seen in posture held dynamically  Static Stand: NORMAL: No deviations seen in posture held statically  Dynamic stand: NORMAL: No deviations seen in posture held dynamically    Therapeutic Activities and Exercises:  No issues noted with balance as pt is able to reach to floor to simeon socks    AM-[facility] 6 CLICK  "ADL  How much help from another person does this patient currently need?  1 = Unable, Total/Dependent Assistance  2 = A lot, Maximum/Moderate Assistance  3 = A little, Minimum/Contact Guard/Supervision  4 = None, Modified [place]/Independent    Putting on and taking off regular lower body clothing? : 4  Bathing (including washing, rinsing, drying)?: 3  Toileting, which includes using toilet, bedpan, or urinal? : 4  Putting on and taking off regular upper body clothing?: 4  Taking care of personal grooming such as brushing teeth?: 4  Eating meals?: 4  Total Score: 23    AM-PAC Raw Score CMS "G-Code Modifier Level of Impairment Assistance   6 % Total / Unable   7 - 9 CM 80 - 100% Maximal Assist   10-14 CL 60 - 80% Moderate Assist   15 - 19 CK 40 - 60% Moderate Assist   20 - 22 CJ 20 - 40% Minimal Assist   23 CI 1-20% SBA / CGA   24 CH 0% Independent/ Mod I       Patient left up in chair with all lines intact, bed alarm on and nurse notified    Assessment:  Mandeep Guy is a 83 y.o. male with a medical diagnosis of Severe sepsis and presents with good mobility. There are no issues noted at this from a mobility standpoint. Pt would benefit from assistance regarding meal prep and cooking as this is becoming a safety concerns. Pt able to ambulate in room without any concerns    Rehab identified problem list/impairments: Rehab identified problem list/impairments:  (none noted)    Rehab potential is excellent.    Activity tolerance: Excellent    Discharge recommendations: Discharge Facility/Level Of Care Needs: home     Barriers to discharge:  none    Equipment recommendations: none     GOALS:    Occupational Therapy Goals     Not on file          Multidisciplinary Problems (Resolved)        Problem: Occupational Therapy Goal    Goal Priority Disciplines Outcome Interventions   Occupational Therapy Goal   (Resolved)     OT, PT/OT Outcome(s) achieved                    PLAN:  OT complete with no needs at this " time.  Plan of Care reviewed with: patient    OT G-codes  Functional Assessment Tool Used: [facility]  Score: 23  Functional Limitation: Self care  Self Care Current Status (): CI  Self Care Goal Status (): CI  Self Care Discharge Status (): SETH Giang OT  09/01/2017

## 2017-09-01 NOTE — PROGRESS NOTES
Shift-change report given to RN Will. Patient in bed awake, alert and fully oriented. He denies pain/distress at this time. Will continue to f/u.

## 2017-09-01 NOTE — ANESTHESIA PREPROCEDURE EVALUATION
09/01/2017  Mandeep Guy is a 83 y.o., male.    Anesthesia Evaluation    I have reviewed the Patient Summary Reports.    I have reviewed the Nursing Notes.   I have reviewed the Medications.     Review of Systems  Anesthesia Hx:  No problems with previous Anesthesia Denies Hx of Anesthetic complications  Neg history of prior surgery. Denies Family Hx of Anesthesia complications.   Denies Personal Hx of Anesthesia complications.   Social:  Non-Smoker, No Alcohol Use    Hematology/Oncology:  Hematology Normal   Oncology Normal     EENT/Dental:EENT/Dental Normal   Cardiovascular:   Exercise tolerance: good Hypertension CAD      Pulmonary:  Pulmonary Normal    Renal/:   Chronic Renal Disease    Hepatic/GI:   GERD    Musculoskeletal:  Musculoskeletal Normal    Neurological:  Neurology Normal    Endocrine:  Endocrine Normal    Dermatological:  Skin Normal    Psych:  Psychiatric Normal           Physical Exam  General:  Well nourished    Airway/Jaw/Neck:  Airway Findings: Mouth Opening: Normal General Airway Assessment: Adult  Mallampati: II  TM Distance: Normal, at least 6 cm         Dental:  Dental Findings: Upper Dentures   Chest/Lungs:  Chest/Lungs Clear    Heart/Vascular:  Heart Findings: Normal Heart murmur: negative       Mental Status:  Mental Status Findings:  Cooperative, Alert and Oriented         Anesthesia Plan  Type of Anesthesia, risks & benefits discussed:  Anesthesia Type:  general  Patient's Preference:   Intra-op Monitoring Plan: standard ASA monitors  Intra-op Monitoring Plan Comments:   Post Op Pain Control Plan: multimodal analgesia, per primary service following discharge from PACU and IV/PO Opioids PRN  Post Op Pain Control Plan Comments:   Induction:   IV  Beta Blocker:  Patient is on a Beta-Blocker and has received one dose within the past 24 hours (No further documentation required).        Informed Consent: Patient understands risks and agrees with Anesthesia plan.  Questions answered. Anesthesia consent signed with patient.  ASA Score: 3     Day of Surgery Review of History & Physical:    H&P update referred to the surgeon.         Ready For Surgery From Anesthesia Perspective.

## 2017-09-01 NOTE — PROGRESS NOTES
Patient taken to endoscopy for endoscopic retrograde cholangiopancreatography (ERCP) at 13:59. Continuous 0.9% NS at 150 ml on wheelchair. Will continue to f/u.

## 2017-09-01 NOTE — PLAN OF CARE
Problem: Patient Care Overview  Goal: Plan of Care Review  Outcome: Ongoing (interventions implemented as appropriate)   09/01/17 0106   Coping/Psychosocial   Plan Of Care Reviewed With patient

## 2017-09-01 NOTE — PLAN OF CARE
Problem: Fall Risk (Adult)  Goal: Identify Related Risk Factors and Signs and Symptoms  Related risk factors and signs and symptoms are identified upon initiation of Human Response Clinical Practice Guideline (CPG)    08/31/17 1957   Fall Risk   Related Risk Factors (Fall Risk) age-related changes;depression/anxiety;fatigue/slow reaction;fear of falling;gait/mobility problems;polypharmacy;environment unfamiliar

## 2017-09-01 NOTE — PLAN OF CARE
09/01/17 1104   Discharge Reassessment   Assessment Type Discharge Planning Reassessment   Discharge Plan A Home with family;Home Health   Discharge Plan B Home with family;Home Health   Can the patient answer the patient profile reliably? Yes, cognitively intact   Describe the patient's ability to walk at the present time. No restrictions     Pt awaiting for ERCP procedure and gallbladder removal surgery. Pt will be here through the weekend. Pt live alone and independent. The goal for pt is discharge next week with HH.

## 2017-09-01 NOTE — PROGRESS NOTES
[facility] Hospital Medicine  Progress Note    Patient Name: Mandeep Guy  MRN: [id number]  Patient Class: IP- Inpatient   Admission Date: 8/28/2017  Length of Stay: 4 days  Attending Physician: Ina Bates MD  Primary Care Provider: Giancarlo Teran MD        Subjective:     Principal Problem:Severe sepsis    HPI:  Mr. Guy is a pleasant 84 yo man with HTN, CAD, PAD s/p stent on chronic anticoagulation, and a right inguinal hernia who presented for 5 days of intermittent abdominal pain. The pain was diffuse but he reported mostly in the lower abdomen. He described it as sharp. Eating made it worse. He denied nausea and vomiting. He also reported difficulty urinating and fevers.    Hospital Course:  In the ER he was found to have severe sepsis with leukocytosis and fevers, likely source being intra-abdominal infection, as well as ARF. Empiric abx with ertapenam and IV fluids were started. LFTs were elevated consistent with choledocholithiasis, cholangitis, and biliary pancreatitis. US showed cholelithiasis without e/o gallbladder wall thickening but an enlarged CBD to 1.8cm. GI took for ERCP 8/29/2917 and placed a stent. Plan for GI for repeat ERCP, sphincterotomy and stone removal later this week and then surgery for cholecystectomy thereafter. Continue abx. Plavix held since Monday.    Review of Systems   Constitutional: Negative for appetite change and fever.   HENT: Negative for congestion and rhinorrhea.    Eyes: Negative for photophobia and visual disturbance.   Respiratory: Negative for cough and shortness of breath.    Cardiovascular: Negative for chest pain and leg swelling.   Gastrointestinal: Negative for abdominal pain, nausea and vomiting.   Musculoskeletal: Negative for gait problem and joint swelling.   Skin: Negative for pallor and rash.   Neurological: Negative for weakness and numbness.   Psychiatric/Behavioral: Negative for confusion and dysphoric mood.     Objective:      Vital Signs (Most Recent):  Temp: 98.2 °F (36.8 °C) (09/01/17 1100)  Pulse: 63 (09/01/17 1313)  Resp: 20 (09/01/17 1100)  BP: 136/65 (09/01/17 1313)  SpO2: 98 % (09/01/17 1100) Vital Signs (24h Range):  Temp:  [97.8 °F (36.6 °C)-98.7 °F (37.1 °C)] 98.2 °F (36.8 °C)  Pulse:  [58-71] 63  Resp:  [18-20] 20  SpO2:  [97 %-99 %] 98 %  BP: (136-220)/(65-95) 136/65     Weight: 79.1 kg (174 lb 6.1 oz)  Body mass index is 26.52 kg/m².    Physical Exam   Constitutional: He is oriented to person, place, and time. He appears well-developed and well-nourished. No distress.   HENT:   Right Ear: External ear normal.   Left Ear: External ear normal.   Nose: Nose normal.   Eyes: EOM are normal. Pupils are equal, round, and reactive to light. No scleral icterus.   Neck: Normal range of motion. Neck supple. No thyromegaly present.   Cardiovascular: Normal rate and normal heart sounds.  Exam reveals no friction rub.    No murmur heard.  Pulmonary/Chest: Effort normal and breath sounds normal. No respiratory distress. He has no wheezes. He has no rales.   Abdominal: Soft. Bowel sounds are normal. He exhibits no distension and no mass. There is no tenderness.   Musculoskeletal: Normal range of motion. He exhibits no edema or deformity.   Neurological: He is alert and oriented to person, place, and time. No cranial nerve deficit.   Skin: Skin is warm and dry. No pallor.   Psychiatric: He has a normal mood and affect. His behavior is normal. Thought content normal.        Significant Labs: All pertinent labs within the past 24 hours have been reviewed.    Significant Imaging: I have reviewed all pertinent imaging results/findings within the past 24 hours.    Assessment/Plan:      * Severe sepsis    Blood Cx with E coli sensitive to ceftriaxone and ertapenam. Likely 2/2 intra-abdominal source. Improved.        Gram-negative bacteremia    As above        Choledocholithiasis    US showed cholelithiasis without e/o gallbladder wall  thickening but an enlarged CBD to 1.8cm. GI took for ERCP 8/29/2917 and placed a stent. Plan for GI for repeat ERCP, sphincterotomy and stone removal 9/1/2017 and then surgery for cholecystectomy thereafter. Plavix held since Monday.        Acute biliary pancreatitis    2/2 above. Diet advanced. Pain resolved.        Acute renal failure    Fluids and monitor. 2/2 sepsis. Improved.        Unilateral inguinal hernia    Reportedly going to have a repair by Dr. Ashby on the day of presentation. CT without apparent issue at this time. Follow up when acute issue resolved.        PAD (peripheral artery disease)    Plavix on hold until after procedures. No acute issue.        Essential hypertension    Restarted lisinopril, coreg, and amlodipine. No acute issue. PRN hydralazine.        CAD (coronary artery disease)    Cont BB and ACEi. Hold statin in the setting of elevated LFTs. OAC on hold until post-procedures. No acute issue.          VTE Risk Mitigation         Ordered     Medium Risk of VTE  Once      08/28/17 2351     Place sequential compression device  Until discontinued      08/28/17 2351     Place PRATEEK hose  Until discontinued      08/28/17 2351     Reason for No Pharmacological VTE Prophylaxis  Once      08/28/17 2351              Ina Bates MD  Department of Hospital Medicine   [facility]

## 2017-09-01 NOTE — SUBJECTIVE & OBJECTIVE
Review of Systems   Constitutional: Negative for appetite change and fever.   HENT: Negative for congestion and rhinorrhea.    Eyes: Negative for photophobia and visual disturbance.   Respiratory: Negative for cough and shortness of breath.    Cardiovascular: Negative for chest pain and leg swelling.   Gastrointestinal: Negative for abdominal pain, nausea and vomiting.   Musculoskeletal: Negative for gait problem and joint swelling.   Skin: Negative for pallor and rash.   Neurological: Negative for weakness and numbness.   Psychiatric/Behavioral: Negative for confusion and dysphoric mood.     Objective:     Vital Signs (Most Recent):  Temp: 98.2 °F (36.8 °C) (09/01/17 1100)  Pulse: 63 (09/01/17 1313)  Resp: 20 (09/01/17 1100)  BP: 136/65 (09/01/17 1313)  SpO2: 98 % (09/01/17 1100) Vital Signs (24h Range):  Temp:  [97.8 °F (36.6 °C)-98.7 °F (37.1 °C)] 98.2 °F (36.8 °C)  Pulse:  [58-71] 63  Resp:  [18-20] 20  SpO2:  [97 %-99 %] 98 %  BP: (136-220)/(65-95) 136/65     Weight: 79.1 kg (174 lb 6.1 oz)  Body mass index is 26.52 kg/m².    Physical Exam   Constitutional: He is oriented to person, place, and time. He appears well-developed and well-nourished. No distress.   HENT:   Right Ear: External ear normal.   Left Ear: External ear normal.   Nose: Nose normal.   Eyes: EOM are normal. Pupils are equal, round, and reactive to light. No scleral icterus.   Neck: Normal range of motion. Neck supple. No thyromegaly present.   Cardiovascular: Normal rate and normal heart sounds.  Exam reveals no friction rub.    No murmur heard.  Pulmonary/Chest: Effort normal and breath sounds normal. No respiratory distress. He has no wheezes. He has no rales.   Abdominal: Soft. Bowel sounds are normal. He exhibits no distension and no mass. There is no tenderness.   Musculoskeletal: Normal range of motion. He exhibits no edema or deformity.   Neurological: He is alert and oriented to person, place, and time. No cranial nerve deficit.    Skin: Skin is warm and dry. No pallor.   Psychiatric: He has a normal mood and affect. His behavior is normal. Thought content normal.        Significant Labs: All pertinent labs within the past 24 hours have been reviewed.    Significant Imaging: I have reviewed all pertinent imaging results/findings within the past 24 hours.

## 2017-09-01 NOTE — PLAN OF CARE
Problem: Occupational Therapy Goal  Goal: Occupational Therapy Goal  Outcome: Outcome(s) achieved Date Met: 09/01/17  OT completed evaluation with no needs at this time.    Recommendations: None

## 2017-09-01 NOTE — PLAN OF CARE
Problem: Fall Risk (Adult)  Goal: Identify Related Risk Factors and Signs and Symptoms  Related risk factors and signs and symptoms are identified upon initiation of Human Response Clinical Practice Guideline (CPG)   Outcome: Ongoing (interventions implemented as appropriate)   09/01/17 4223   Fall Risk   Related Risk Factors (Fall Risk) age-related changes;depression/anxiety;fatigue/slow reaction;fear of falling;polypharmacy;environment unfamiliar       Comments: Will continue to reinforce that patient use his call-light for ambulating out of bed. Also, patient wearing non-skid socks and urinal is at his bed-side. Will continue to f/u.

## 2017-09-02 VITALS
WEIGHT: 174.38 LBS | DIASTOLIC BLOOD PRESSURE: 55 MMHG | HEART RATE: 59 BPM | TEMPERATURE: 98 F | OXYGEN SATURATION: 99 % | HEIGHT: 68 IN | SYSTOLIC BLOOD PRESSURE: 113 MMHG | BODY MASS INDEX: 26.43 KG/M2 | RESPIRATION RATE: 16 BRPM

## 2017-09-02 LAB
ALBUMIN SERPL BCP-MCNC: 2.3 G/DL
ALP SERPL-CCNC: 214 U/L
ALT SERPL W/O P-5'-P-CCNC: 54 U/L
ANION GAP SERPL CALC-SCNC: 6 MMOL/L
AST SERPL-CCNC: 15 U/L
BASOPHILS # BLD AUTO: 0.01 K/UL
BASOPHILS NFR BLD: 0.2 %
BILIRUB SERPL-MCNC: 0.6 MG/DL
BUN SERPL-MCNC: 17 MG/DL
CALCIUM SERPL-MCNC: 9 MG/DL
CHLORIDE SERPL-SCNC: 112 MMOL/L
CO2 SERPL-SCNC: 22 MMOL/L
CREAT SERPL-MCNC: 1.5 MG/DL
DIFFERENTIAL METHOD: ABNORMAL
EOSINOPHIL # BLD AUTO: 0.1 K/UL
EOSINOPHIL NFR BLD: 1.7 %
ERYTHROCYTE [DISTWIDTH] IN BLOOD BY AUTOMATED COUNT: 14.5 %
EST. GFR  (AFRICAN AMERICAN): 49 ML/MIN/1.73 M^2
EST. GFR  (NON AFRICAN AMERICAN): 42 ML/MIN/1.73 M^2
GLUCOSE SERPL-MCNC: 89 MG/DL
HCT VFR BLD AUTO: 33 %
HGB BLD-MCNC: 10.9 G/DL
LYMPHOCYTES # BLD AUTO: 1.4 K/UL
LYMPHOCYTES NFR BLD: 21.2 %
MCH RBC QN AUTO: 30.2 PG
MCHC RBC AUTO-ENTMCNC: 33 G/DL
MCV RBC AUTO: 91 FL
MONOCYTES # BLD AUTO: 0.9 K/UL
MONOCYTES NFR BLD: 13.2 %
NEUTROPHILS # BLD AUTO: 4.1 K/UL
NEUTROPHILS NFR BLD: 62 %
PLATELET # BLD AUTO: 365 K/UL
PMV BLD AUTO: 10.6 FL
POTASSIUM SERPL-SCNC: 4.7 MMOL/L
PROT SERPL-MCNC: 5.7 G/DL
RBC # BLD AUTO: 3.61 M/UL
SODIUM SERPL-SCNC: 140 MMOL/L
WBC # BLD AUTO: 6.57 K/UL

## 2017-09-02 PROCEDURE — 25000003 PHARM REV CODE 250: Performed by: EMERGENCY MEDICINE

## 2017-09-02 PROCEDURE — G8979 MOBILITY GOAL STATUS: HCPCS | Mod: CI | Performed by: PHYSICAL THERAPIST

## 2017-09-02 PROCEDURE — 94761 N-INVAS EAR/PLS OXIMETRY MLT: CPT

## 2017-09-02 PROCEDURE — 97162 PT EVAL MOD COMPLEX 30 MIN: CPT | Performed by: PHYSICAL THERAPIST

## 2017-09-02 PROCEDURE — 63600175 PHARM REV CODE 636 W HCPCS: Performed by: INTERNAL MEDICINE

## 2017-09-02 PROCEDURE — G8978 MOBILITY CURRENT STATUS: HCPCS | Mod: CJ | Performed by: PHYSICAL THERAPIST

## 2017-09-02 PROCEDURE — 36415 COLL VENOUS BLD VENIPUNCTURE: CPT

## 2017-09-02 PROCEDURE — G8980 MOBILITY D/C STATUS: HCPCS | Mod: CJ

## 2017-09-02 PROCEDURE — 85025 COMPLETE CBC W/AUTO DIFF WBC: CPT

## 2017-09-02 PROCEDURE — 25000003 PHARM REV CODE 250: Performed by: INTERNAL MEDICINE

## 2017-09-02 PROCEDURE — 25000003 PHARM REV CODE 250: Performed by: STUDENT IN AN ORGANIZED HEALTH CARE EDUCATION/TRAINING PROGRAM

## 2017-09-02 PROCEDURE — 80053 COMPREHEN METABOLIC PANEL: CPT

## 2017-09-02 PROCEDURE — S0028 INJECTION, FAMOTIDINE, 20 MG: HCPCS | Performed by: EMERGENCY MEDICINE

## 2017-09-02 RX ORDER — CIPROFLOXACIN 500 MG/1
500 TABLET ORAL 2 TIMES DAILY
Qty: 16 TABLET | Refills: 0 | Status: SHIPPED | OUTPATIENT
Start: 2017-09-02 | End: 2017-09-03

## 2017-09-02 RX ORDER — DOCUSATE SODIUM 100 MG/1
100 CAPSULE, LIQUID FILLED ORAL 2 TIMES DAILY
Status: DISCONTINUED | OUTPATIENT
Start: 2017-09-02 | End: 2017-09-02 | Stop reason: HOSPADM

## 2017-09-02 RX ORDER — HYDROCHLOROTHIAZIDE 25 MG/1
25 TABLET ORAL DAILY
Qty: 30 TABLET | Refills: 11 | Status: SHIPPED | OUTPATIENT
Start: 2017-09-03 | End: 2018-09-03

## 2017-09-02 RX ORDER — LISINOPRIL 20 MG/1
20 TABLET ORAL DAILY
Qty: 90 TABLET | Refills: 3 | Status: SHIPPED | OUTPATIENT
Start: 2017-09-03 | End: 2017-09-14

## 2017-09-02 RX ORDER — CARVEDILOL 12.5 MG/1
12.5 TABLET ORAL 2 TIMES DAILY
Qty: 60 TABLET | Refills: 11 | Status: SHIPPED | OUTPATIENT
Start: 2017-09-02 | End: 2018-09-02

## 2017-09-02 RX ADMIN — DOCUSATE SODIUM 100 MG: 100 CAPSULE, LIQUID FILLED ORAL at 10:09

## 2017-09-02 RX ADMIN — HYDRALAZINE HYDROCHLORIDE 10 MG: 20 INJECTION INTRAMUSCULAR; INTRAVENOUS at 06:09

## 2017-09-02 RX ADMIN — FAMOTIDINE 20 MG: 10 INJECTION, SOLUTION INTRAVENOUS at 10:09

## 2017-09-02 RX ADMIN — AMLODIPINE BESYLATE 10 MG: 5 TABLET ORAL at 10:09

## 2017-09-02 RX ADMIN — CARVEDILOL 12.5 MG: 6.25 TABLET, FILM COATED ORAL at 10:09

## 2017-09-02 RX ADMIN — SODIUM CHLORIDE: 0.9 INJECTION, SOLUTION INTRAVENOUS at 06:09

## 2017-09-02 RX ADMIN — HYDROCHLOROTHIAZIDE 25 MG: 25 TABLET ORAL at 10:09

## 2017-09-02 RX ADMIN — LISINOPRIL 20 MG: 20 TABLET ORAL at 10:09

## 2017-09-02 RX ADMIN — CEFTRIAXONE 2 G: 2 INJECTION, SOLUTION INTRAVENOUS at 10:09

## 2017-09-02 NOTE — PROGRESS NOTES
Loly called back to say pt has [facility] home [facility].Gardenia Wong RN, BSN, STN [facility]  9/2/2017

## 2017-09-02 NOTE — PROGRESS NOTES
Shift-change report given to VENICE Knox. Patient sitting up in chair. He denies pain/discomfort at this time. Will continue to f/u.

## 2017-09-02 NOTE — PT/OT/SLP EVAL
Physical Therapy  Evaluation    Mandeep Guy   MRN: [id number]   Admitting Diagnosis: Severe sepsis    PT Received On: 09/02/17  PT Start Time: 0900     PT Stop Time: 0930    PT Total Time (min): 30 min       Billable Minutes:  Evaluation 30 min     Diagnosis: Severe sepsis    Past Medical History:   Diagnosis Date    Anticoagulant long-term use     Coronary artery disease     GERD (gastroesophageal reflux disease)     Hypertension     PAD (peripheral artery disease)     s/p stent      Past Surgical History:   Procedure Laterality Date    HERNIA REPAIR      stents         Referring physician: MD Jana  Date referred to PT: 09/01/2017    General Precautions: Standard, fall  Orthopedic Precautions: Full weight bearing   Braces: N/A       Patient History:  Lives With: alone  Living Arrangements: apartment, independent living facility  Home Accessibility: other (see comments) (- pt utilizes elevation for access to 4th floor apartment. )  Stair Railings at Home: none  Transportation Available: car, family or friend will provide  Equipment Currently Used at Home: none  DME owned (not currently used): none    Previous Level of Function:  Ambulation Skills: independent  Transfer Skills: independent  ADL Skills: independent    Subjective:  Communicated with Nurse Diallo prior to session.    Chief Complaint: none stated  Patient goals: to return to PLOF    Pain/Comfort  Pain Rating 1: 0/10      Objective:   Patient found with: peripheral IV     Cognitive Exam:  Oriented to: Person, Place and Situation    Follows Commands/attention: Follows one-step commands  Communication: clear/fluent  Safety awareness/insight to disability: impaired; kept lift RW off of floor requiring VC's to correct.     Physical Exam:  Postural examination/scapula alignment: Rounded shoulder and Head forward    Skin integrity: Visible skin intact  Edema: None noted     Lower Extremity Range of Motion:  Right Lower Extremity: WFL  Left Lower  Extremity: WFL    Lower Extremity Strength:  Right Lower Extremity: WFL  Left Lower Extremity: WFL     Gross motor coordination: WFL    Functional Mobility:  Bed Mobility:  Rolling/Turning to Left: Stand by assistance, With side rail  Rolling/Turning Right: Stand by assistance, With side rail  Supine to Sit: Contact Guard Assistance, With side rail  Sit to Supine: Contact Guard Assistance, With siderail    Transfers:  Sit <> Stand Assistance: Contact Guard Assistance  Sit <> Stand Assistive Device: Rolling Walker    Gait:   Gait Distance: 100' with continuous VC's to stop picking up RW off the floor.    Assistance 1: Contact Guard Assistance  Gait Assistive Device: Rolling walker  Gait Pattern: 3-point gait  Gait Deviation(s): decreased colten, increased time in double stance, decreased step length    Balance:   Static Sit: FAIR+: Able to take MINIMAL challenges from all directions  Dynamic Sit: FAIR+: Maintains balance through MINIMAL excursions of active trunk motion  Static Stand: FAIR: Maintains without assist but unable to take challenges  Dynamic stand: FAIR: Needs CONTACT GUARD during gait    AM-PAC 6 CLICK MOBILITY  How much help from another person does this patient currently need?   1 = Unable, Total/Dependent Assistance  2 = A lot, Maximum/Moderate Assistance  3 = A little, Minimum/Contact Guard/Supervision  4 = None, Modified [place]/Independent    Turning over in bed (including adjusting bedclothes, sheets and blankets)?: 4  Sitting down on and standing up from a chair with arms (e.g., wheelchair, bedside commode, etc.): 4  Moving from lying on back to sitting on the side of the bed?: 4  Moving to and from a bed to a chair (including a wheelchair)?: 4  Need to walk in hospital room?: 3  Climbing 3-5 steps with a railing?: 3  Total Score: 22     AM-PAC Raw Score CMS G-Code Modifier Level of Impairment Assistance   6 % Total / Unable   7 - 9 CM 80 - 100% Maximal Assist   10 - 14 CL 60 - 80%  Moderate Assist   15 - 19 CK 40 - 60% Moderate Assist   20 - 22 CJ 20 - 40% Minimal Assist   23 CI 1-20% SBA / CGA   24 CH 0% Independent/ Mod I     Patient left up in chair with all lines intact and call button in reach.    Assessment:   Mandeep Guy is a 83 y.o. male with a medical diagnosis of Severe sepsis and presents with decrease functional independence with ADLs and ambulation distances.  Pt would benefit from skilled PT services to address established deficits in POC to return to PLOF and QOL within previous living environment.    Rehab identified problem list/impairments: Rehab identified problem list/impairments: impaired functional mobilty, decreased safety awareness, impaired endurance    Rehab potential is good.    Activity tolerance: Good    Discharge recommendations: Discharge Facility/Level Of Care Needs: home health PT     Barriers to discharge: Barriers to Discharge: Inaccessible home environment    Equipment recommendations: Equipment Needed After Discharge: none     GOALS:    Physical Therapy Goals        Problem: Physical Therapy Goal    Goal Priority Disciplines Outcome Goal Variances Interventions   Physical Therapy Goal     PT/OT, PT      Description:  Goals to be met by: 09/15/2017    Patient will increase functional independence with mobility by performin. Supine to sit with [place]  2. Sit to supine with [place]  3. Sit to stand transfer with [place]  4. Gait  x 350 feet with Modified [place] using Rolling Walker.    Recommend: HHPT at time of discharge.                        PLAN:    Patient to be seen 3 x/week to address the above listed problems via gait training, therapeutic activities, therapeutic exercises  Plan of Care expires: 09/15/17  Plan of Care reviewed with: patient    Functional Assessment Tool Used: AM PAC  Score: 22  Functional Limitation: Mobility: Walking and moving around  Mobility: Walking and Moving Around Current Status ():  CJ  Mobility: Walking and Moving Around Goal Status (): CI     Joe Alberts, PT  09/02/2017

## 2017-09-02 NOTE — PROGRESS NOTES
SW met with patient to discuss HH. Patient stated he has not had services before.  He is okay with PHN selecting provider, and signed the Patient Choice Form.  [facility] faxed orders, face sheet, h&p, and progress notes to [facility] [phone].

## 2017-09-02 NOTE — PROGRESS NOTES
TN called [phone], 3742to [facility] to check on Home health...Gardenia Wong RN, BSN, STN [facility]  9/2/2017    TN awaiting call back..

## 2017-09-02 NOTE — PROGRESS NOTES
[facility]  General Surgery  Progress Note    Subjective:     Interval History: ERCP cancelled yesterday due to high BP.      Medications:  Continuous Infusions:   sodium chloride 0.9% 150 mL/hr at 09/02/17 0619     Scheduled Meds:   amlodipine  10 mg Oral Daily    carvedilol  12.5 mg Oral BID    cefTRIAXone (ROCEPHIN) IVPB  2 g Intravenous Q24H    famotidine (PF)  20 mg Intravenous Daily    hydrochlorothiazide  25 mg Oral Daily    lisinopril  20 mg Oral Daily     PRN Meds:acetaminophen, hydrALAZINE, HYDROmorphone, morphine, omnipaque 300 iohexol, ondansetron, promethazine (PHENERGAN) IVPB, racepinephrine     Objective:     Vital Signs (Most Recent):  Temp: 98.1 °F (36.7 °C) (09/02/17 0900)  Pulse: 73 (09/02/17 0900)  Resp: 17 (09/02/17 0900)  BP: (!) 191/77 (09/02/17 0900)  SpO2: 99 % (09/02/17 0900) Vital Signs (24h Range):  Temp:  [97.4 °F (36.3 °C)-98.2 °F (36.8 °C)] 98.1 °F (36.7 °C)  Pulse:  [58-73] 73  Resp:  [17-20] 17  SpO2:  [97 %-99 %] 99 %  BP: (117-197)/(64-84) 191/77       Intake/Output Summary (Last 24 hours) at 09/02/17 1024  Last data filed at 09/02/17 0900   Gross per 24 hour   Intake             1770 ml   Output             2775 ml   Net            -1005 ml       Physical Exam  No distress  Abdomen soft  Significant Labs:  CBC:     Recent Labs  Lab 09/02/17  0456   WBC 6.57   RBC 3.61*   HGB 10.9*   HCT 33.0*   *   MCV 91   MCH 30.2   MCHC 33.0     CMP:     Recent Labs  Lab 09/02/17  0456   GLU 89   CALCIUM 9.0   ALBUMIN 2.3*   PROT 5.7*      K 4.7   CO2 22*   *   BUN 17   CREATININE 1.5*   ALKPHOS 214*   ALT 54*   AST 15   BILITOT 0.6       Significant Diagnostics:  None    Assessment/Plan:   83M with cholangitis, choledocholithiasis, biliary pancreatitis s/p ERCP and stent placement 8/29. Plan per GI for repeat ERCP, sphincterotomy and stone removal   Continue to hold plavix  Continue ABX  Possible lap kaushal today vs this weekend,if gets ERCP  Active  Diagnoses:    Diagnosis Date Noted POA    PRINCIPAL PROBLEM:  Severe sepsis [A41.9, R65.20] 08/29/2017 Yes    Acute biliary pancreatitis [K85.10] 08/29/2017 Yes    Acute renal failure [N17.9] 08/29/2017 Yes    Gram-negative bacteremia [R78.81] 08/29/2017 Yes    CAD (coronary artery disease) [I25.10] 08/29/2017 Yes     Chronic    Essential hypertension [I10] 08/29/2017 Yes     Chronic    PAD (peripheral artery disease) [I73.9] 08/29/2017 Yes     Chronic    Unilateral inguinal hernia [K40.90] 08/29/2017 Yes     Chronic    Choledocholithiasis [K80.50] 08/28/2017 Yes      Problems Resolved During this Admission:    Diagnosis Date Noted Date Resolved POA         Nadja Mccracken MD  General Surgery  [facility]

## 2017-09-02 NOTE — NURSING
In preparation for discharge, d/c'ed patient's saline lock, applied pressure to site, secured site with gauze and tape, no redness or swelling noted. D/C'ed patient's tele, SR, prior to removal. Patient is sitting in room waiting for home health to be set up discharge instructions. NAD noted.

## 2017-09-02 NOTE — NURSING
Provided patient with d/c instructions and patient verbalized understanding. Patient was ambulated off unit to car for discharge. NAD noted.

## 2017-09-02 NOTE — PROGRESS NOTES
Loly from [facility] called back says she will review orders for home health and call TN back..Gardenia Wong RN, BSN, STN CCM  9/2/2017

## 2017-09-02 NOTE — PLAN OF CARE
Problem: Fall Risk (Adult)  Goal: Absence of Falls  Patient will demonstrate the desired outcomes by discharge/transition of care.   Outcome: Ongoing (interventions implemented as appropriate)   09/02/17 0437   Fall Risk (Adult)   Absence of Falls making progress toward outcome       Problem: Patient Care Overview  Goal: Plan of Care Review  Outcome: Ongoing (interventions implemented as appropriate)   09/02/17 0437   Coping/Psychosocial   Plan Of Care Reviewed With patient       Problem: Pain, Acute (Adult)  Goal: Acceptable Pain Control/Comfort Level  Patient will demonstrate the desired outcomes by discharge/transition of care.   Outcome: Ongoing (interventions implemented as appropriate)   09/02/17 0437   Pain, Acute (Adult)   Acceptable Pain Control/Comfort Level making progress toward outcome       Problem: Nausea/Vomiting (Adult)  Goal: Adequate Hydration  Patient will demonstrate the desired outcomes by discharge/transition of care.   Outcome: Ongoing (interventions implemented as appropriate)   09/02/17 0437   Nausea/Vomiting (Adult)   Adequate Hydration making progress toward outcome       Problem: Infection, Risk/Actual (Adult)  Goal: Infection Prevention/Resolution  Patient will demonstrate the desired outcomes by discharge/transition of care.   Outcome: Ongoing (interventions implemented as appropriate)   09/02/17 0437   Infection, Risk/Actual (Adult)   Infection Prevention/Resolution making progress toward outcome       Problem: Breathing Pattern Ineffective (Adult)  Goal: Anxiety/Fear Reduction  Patient will demonstrate the desired outcomes by discharge/transition of care.   Outcome: Ongoing (interventions implemented as appropriate)   09/02/17 0437   Breathing Pattern Ineffective (Adult)   Anxiety/Fear Reduction making progress toward outcome

## 2017-09-02 NOTE — NURSING
Report received from VENICE Knox. Visualized patient and assessed patient's overall condition and appearance. NAD noted. Will continue to monitor.

## 2017-09-02 NOTE — PLAN OF CARE
[facility]  HOME  HEALTH ORDERS     09/02/2017    Admit to Home Health    Diagnoses:  Active Hospital Problems    Diagnosis  POA    *Severe sepsis [A41.9, R65.20]  Yes     Priority: 1 - High    Gram-negative bacteremia [R78.81]  Yes     Priority: 2     Choledocholithiasis [K80.50]  Yes     Priority: 3      ERCP with stent placement 8/29. Plan for repeat ERCP and then cholecystectomy.      Acute biliary pancreatitis [K85.10]  Yes     Priority: 4     Acute renal failure [N17.9]  Yes     Priority: 5     CAD (coronary artery disease) [I25.10]  Yes     Chronic    Essential hypertension [I10]  Yes     Chronic    PAD (peripheral artery disease) [I73.9]  Yes     Chronic    Unilateral inguinal hernia [K40.90]  Yes     Chronic      Resolved Hospital Problems    Diagnosis Date Resolved POA   No resolved problems to display.       Patient is homebound due to:  Severe sepsis    Allergies:  Review of patient's allergies indicates:   Allergen Reactions    Penicillins Rash        Diet: Heart healthy    Acitivities: As tolerated    Nursing:   SN to complete comprehensive assessment including routine vital signs. Instruct on disease process and s/s of complications to report to MD. Review/verify medication list sent home with the patient at time of discharge  and instruct patient/caregiver as needed. Frequency may be adjusted depending on start of care date.    Notify MD if SBP > 160 or < 90; DBP > 90 or < 50; HR > 120 or < 50; Temp > 101    CONSULTS:      PT to evaluate and treat. Evaluate for home safety and equipment needs; Establish/upgrade home exercise program. Perform / instruct on therapeutic exercises, gait training, transfer training, and Range of Motion.    OT to evaluate and treat. Evaluate home environment for safety and equipment needs. Perform/Instruct on transfers, ADL training, ROM, and therapeutic exercises.    Aide to provide assistance with personal care, ADLs, and vital  signs      Medications: Review discharge medications with patient and family and provide education.       Mandeep Guy   Home Medication Instructions GALILEO:[id number]    Printed on:09/02/17 1232   Medication Information                      amlodipine (NORVASC) 10 MG tablet  Take 10 mg by mouth once daily.             aspirin 81 MG Chew  Take 81 mg by mouth once daily.             carvedilol (COREG) 12.5 MG tablet  Take 1 tablet (12.5 mg total) by mouth 2 (two) times daily.             ciprofloxacin HCl (CIPRO) 500 MG tablet  Take 1 tablet (500 mg total) by mouth 2 (two) times daily.             ergocalciferol (ERGOCALCIFEROL) 50,000 unit Cap  Take 50,000 Units by mouth every 7 days.             hydrochlorothiazide (HYDRODIURIL) 25 MG tablet  Take 1 tablet (25 mg total) by mouth once daily.             lisinopril (PRINIVIL,ZESTRIL) 20 MG tablet  Take 1 tablet (20 mg total) by mouth once daily.             melatonin 3 mg TbDL  Take by mouth.             nitroGLYCERIN (NITROSTAT) 0.4 MG SL tablet  Place 1 tablet (0.4 mg total) under the tongue every 5 (five) minutes as needed for Chest pain (if more than three doses needed, seek urgent medical attention).             pantoprazole (PROTONIX) 40 MG tablet  Take 40 mg by mouth once daily.             polyethylene glycol (GLYCOLAX) 17 gram/dose powder  Take 17 g by mouth daily as needed (constipation).             pravastatin (PRAVACHOL) 40 MG tablet  Take 40 mg by mouth once daily.                       _________________________________  Ina Bates MD  09/02/2017

## 2017-09-02 NOTE — PROGRESS NOTES
GI    Discussed with Dr. Bates.  ERCP cancelled by Anesthesia yest.  Bili normal with stent in place.  WBC normal.  No fever.  From our perspective, he can be discharged with plans for repeat ERCP next week or week after.

## 2017-09-02 NOTE — PLAN OF CARE
Problem: Physical Therapy Goal  Goal: Physical Therapy Goal  Goals to be met by: 09/15/2017    Patient will increase functional independence with mobility by performin. Supine to sit with [place]  2. Sit to supine with [place]  3. Sit to stand transfer with [place]  4. Gait  x 350 feet with Modified [place] using Rolling Walker.    Recommend: HHPT at time of discharge.      Joe Alberts, PT  2017

## 2017-09-02 NOTE — PROGRESS NOTES
WRITTEN DISCHARGE INFORMATION:     Follow-up Information     Giancarlo Teran MD On 9/7/2017.    Specialty:  Internal Medicine  Why:  @11:30am for hospital follow up, Outpatient services  Contact information:  [address] [facility]  [place] [address]  [phone]             Adonis Wallace MD In 3 days.    Specialty:  Gastroenterology  Why:  call with appointment [phone]  Contact information:  [address] [place] [address]  [address]  [facility]  [place] [address]  [phone]             Ted Ashby MD In 3 days.    Specialties:  General Surgery, Bariatrics  Why:  call with appointment [phone]  Contact information:  [address] [facility]  [address]  [facility]  [place] [address]  [phone]             [facility] On 9/3/2017.    Why:  Home Health  Contact information:  [address]  [address]  [facility] [address]  [phone]               Things that YOU are responsible for to Manage Your Care At Home:  1. Getting your prescriptions filled.  2. Taking you medications as directed. DO NOT MISS ANY DOSES!  3. Going to your follow-up doctor appointments. This is important because it allows the doctor to monitor your progress and to determine if any changes need to be made to your treatment plan.                                                 Help at Home  After discharge for assistance [facility] On [facility] Line 24/7 assistance  [phone]     Thank you for choosing [facility] for your care.  Please answer any calls you may receive from [facility] we want to continue to support you as you manage your healthcare needs.  Sincerely, Your [facility] Healthcare Manager is,  Gardenia Wong RN [facility] [phone]

## 2017-09-03 ENCOUNTER — TELEPHONE (OUTPATIENT)
Dept: HEPATOLOGY | Facility: HOSPITAL | Age: 82
End: 2017-09-03

## 2017-09-03 RX ORDER — SULFAMETHOXAZOLE AND TRIMETHOPRIM 400; 80 MG/1; MG/1
2 TABLET ORAL 2 TIMES DAILY
Qty: 24 TABLET | Refills: 0 | Status: SHIPPED | OUTPATIENT
Start: 2017-09-03 | End: 2017-09-09

## 2017-09-03 NOTE — TELEPHONE ENCOUNTER
Message left to inform him to stop cipro and get Bactrim from his pharmacy. [facility].

## 2017-09-04 ENCOUNTER — TELEPHONE (OUTPATIENT)
Dept: HEPATOLOGY | Facility: HOSPITAL | Age: 82
End: 2017-09-04

## 2017-09-04 NOTE — TELEPHONE ENCOUNTER
Contacted by phone. He has picked up his bactrim and started taking it. He will take the ciprofloxacin out of his medication bag and discard it.

## 2017-09-04 NOTE — DISCHARGE SUMMARY
[facility] Hospital Medicine  Discharge Summary      Patient Name: Mandeep Guy  MRN: [id number]  Admission Date: 8/28/2017  Hospital Length of Stay: 5 days  Discharge Date and Time: 9/2/2017  3:56 PM  Attending Physician: No att. providers found   Discharging Provider: Ina Bates MD  Primary Care Provider: Giancarlo Teran MD      HPI:   Mr. Guy is a pleasant 84 yo man with HTN, CAD, PAD s/p stent on chronic anticoagulation, and a right inguinal hernia who presented for 5 days of intermittent abdominal pain. The pain was diffuse but he reported mostly in the lower abdomen. He described it as sharp. Eating made it worse. He denied nausea and vomiting. He also reported difficulty urinating and fevers.    Procedure(s) (LRB):  ERCP (N/A)      Hospital Course:   In the ER he was found to have severe sepsis with leukocytosis and fevers, likely source being intra-abdominal infection, as well as ARF. Empiric abx with ertapenam and IV fluids were started. LFTs were elevated consistent with choledocholithiasis, cholangitis, and biliary pancreatitis. US showed cholelithiasis without e/o gallbladder wall thickening but an enlarged CBD to 1.8cm. GI took for ERCP 8/29/2917 and placed a stent. The plan was for GI to repeat ERCP w/ sphincterotomy and stone removal later that week followed by cholecystectomy thereafter. He went for EGD on Friday 9/1/17 but his blood pressure was elevated so anesthesia deferred the case until his blood pressure was lower. Mr. Guy had been temporized, feeling well, and eating for the previous 3 days. Given that his case was non-emergent and it was a holiday weekend, it made more sense to discharge him and have him follow up for EGD and cholecystectomy on an outpatient bases. This was discussed with him and he agreed. He was asked to immediately return to the ER if abdominal pain returned. He was asked to continue to hold his Plavix which he was on for chronic PAD. He  was initially prescribed cipro to complete a course of abx after discharge. I realized the E coli that grew was resistant up writing my discharge summary the following day. I called the patient but had to leave a message informing him that he needs to take bactrim not cipro. I will continue to try to call him. [facility]  Of note, he was on multiple BP meds, some of which were increased during admission. His BP did not appear to be related to stress or pain, but I do suspect he has atherosclerosis making his blood pressure appear elevated and difficult to treat.     Consults:   Consults         Status Ordering Provider     Inpatient consult to Gastroenterology  Once     Provider:  Giancarlo Rubin MD    Completed ANDREE MISHRA     Inpatient consult to General Surgery  Once     Provider:  Ted Ashby MD    Completed BERNIE ACKERMAN        Final Active Diagnoses:    Diagnosis Date Noted POA    PRINCIPAL PROBLEM:  Severe sepsis [A41.9, R65.20] 08/29/2017 Yes    Gram-negative bacteremia [R78.81] 08/29/2017 Yes    Choledocholithiasis [K80.50] 08/28/2017 Yes    Acute biliary pancreatitis [K85.10] 08/29/2017 Yes    Acute renal failure [N17.9] 08/29/2017 Yes    CAD (coronary artery disease) [I25.10] 08/29/2017 Yes     Chronic    Essential hypertension [I10] 08/29/2017 Yes     Chronic    PAD (peripheral artery disease) [I73.9] 08/29/2017 Yes     Chronic    Unilateral inguinal hernia [K40.90] 08/29/2017 Yes     Chronic      Problems Resolved During this Admission:    Diagnosis Date Noted Date Resolved POA      Discharged Condition: stable    Disposition: Home or Self Care    Follow Up:  Follow-up Information     Giancarlo Teran MD On 9/7/2017.    Specialty:  Internal Medicine  Why:  @11:30am for hospital follow up, Outpatient services  Contact information:  [address] [place] [address]  [phone]             Adonis Wallace MD In 3 days.     Specialty:  Gastroenterology  Why:  call with appointment [phone]  Contact information:  [address] [place] [address]  [address]  [facility]  [place] [id number] [phone]             Ted Ashby MD In 3 days.    Specialties:  General Surgery, Bariatrics  Why:  call with appointment [phone]  Contact information:  [address]  [address]  [facility]  [place] [address]  [phone]             [facility] On 9/3/2017.    Why:  Home Health  Contact information:  [address]  [address]  [place] [address]  [phone]                 Patient Instructions:     Diet Cardiac     Activity as tolerated     Call MD for:  temperature >100.4     Call MD for:  persistent nausea and vomiting or diarrhea     Call MD for:  severe uncontrolled pain     Call MD for:  redness, tenderness, or signs of infection (pain, swelling, redness, odor or green/yellow discharge around incision site)     Call MD for:  difficulty breathing or increased cough     Call MD for:  severe persistent headache     Call MD for:  worsening rash     Call MD for:  persistent dizziness, light-headedness, or visual disturbances     Call MD for:  increased confusion or weakness       Medications:  Reconciled Home Medications:   Discharge Medication List as of 9/2/2017  3:43 PM      CONTINUE these medications which have CHANGED    Details   carvedilol (COREG) 12.5 MG tablet Take 1 tablet (12.5 mg total) by mouth 2 (two) times daily., Starting Sat 9/2/2017, Until Sun 9/2/2018, Normal      hydrochlorothiazide (HYDRODIURIL) 25 MG tablet Take 1 tablet (25 mg total) by mouth once daily., Starting Sun 9/3/2017, Until Mon 9/3/2018, Normal      lisinopril (PRINIVIL,ZESTRIL) 20 MG tablet Take 1 tablet (20 mg total) by mouth once daily., Starting Sun 9/3/2017, Until Mon 9/3/2018, Normal      ciprofloxacin HCl (CIPRO) 500 MG tablet Take 1 tablet (500 mg total) by mouth 2  (two) times daily., Starting Sat 9/2/2017, Until Sun 9/10/2017, Normal         CONTINUE these medications which have NOT CHANGED    Details   amlodipine (NORVASC) 10 MG tablet Take 10 mg by mouth once daily., Until Discontinued, Historical Med      aspirin 81 MG Chew Take 81 mg by mouth once daily., Until Discontinued, Historical Med      ergocalciferol (ERGOCALCIFEROL) 50,000 unit Cap Take 50,000 Units by mouth every 7 days., Historical Med      melatonin 3 mg TbDL Take by mouth., Until Discontinued, Historical Med      nitroGLYCERIN (NITROSTAT) 0.4 MG SL tablet Place 1 tablet (0.4 mg total) under the tongue every 5 (five) minutes as needed for Chest pain (if more than three doses needed, seek urgent medical attention)., Starting 1/1/2016, Until Sun 1/31/16, Print      pantoprazole (PROTONIX) 40 MG tablet Take 40 mg by mouth once daily., Until Discontinued, Historical Med      polyethylene glycol (GLYCOLAX) 17 gram/dose powder Take 17 g by mouth daily as needed (constipation)., Starting 1/1/2016, Until Discontinued, Print      pravastatin (PRAVACHOL) 40 MG tablet Take 40 mg by mouth once daily., Until Discontinued, Historical Med         STOP taking these medications       clopidogrel (PLAVIX) 75 mg tablet Comments:   Reason for Stopping:             Time spent on the discharge of patient: 60 minutes    HOS POC IP DISCHARGE SUMMARY    Ina Bates MD  Department of Hospital Medicine  [facility]

## 2017-09-05 NOTE — PT/OT/SLP DISCHARGE
Physical Therapy Discharge Summary    Mandeep Guy  MRN: [id number]   Severe sepsis   Patient Discharged from acute Physical Therapy on 17.  Please refer to prior PT notes for functional status.     Assessment:   Patient has not met goals.  GOALS:    Physical Therapy Goals     Not on file          Multidisciplinary Problems (Resolved)        Problem: Physical Therapy Goal    Goal Priority Disciplines Outcome Goal Variances Interventions   Physical Therapy Goal   (Resolved)     PT/OT, PT Ongoing     Description:  Goals to be met by: 09/15/2017    Patient will increase functional independence with mobility by performin. Supine to sit with [place]  2. Sit to supine with [place]  3. Sit to stand transfer with [place]  4. Gait  x 350 feet with Modified [place] using Rolling Walker.    Recommend: HHPT at time of discharge.                      Reasons for Discontinuation of Therapy Services  Transfer to alternate level of care.      Plan:  Patient Discharged to: Home with Home Health Service.

## 2017-09-18 ENCOUNTER — HOSPITAL ENCOUNTER (OUTPATIENT)
Dept: PREADMISSION TESTING | Facility: HOSPITAL | Age: 82
Discharge: HOME | End: 2017-09-18
Attending: SURGERY
Payer: MEDICARE

## 2017-09-18 VITALS
HEART RATE: 62 BPM | RESPIRATION RATE: 16 BRPM | DIASTOLIC BLOOD PRESSURE: 75 MMHG | TEMPERATURE: 98 F | SYSTOLIC BLOOD PRESSURE: 165 MMHG | BODY MASS INDEX: 27.96 KG/M2 | WEIGHT: 178.13 LBS | OXYGEN SATURATION: 99 % | HEIGHT: 67 IN

## 2017-09-18 DIAGNOSIS — Z01.818 PRE-OP TESTING: Primary | ICD-10-CM

## 2017-09-18 LAB
ANION GAP SERPL CALC-SCNC: 5 MMOL/L
BASOPHILS # BLD AUTO: 0.01 K/UL
BASOPHILS NFR BLD: 0.2 %
BUN SERPL-MCNC: 19 MG/DL
CALCIUM SERPL-MCNC: 9.9 MG/DL
CHLORIDE SERPL-SCNC: 105 MMOL/L
CO2 SERPL-SCNC: 24 MMOL/L
CREAT SERPL-MCNC: 1.6 MG/DL
DIFFERENTIAL METHOD: ABNORMAL
EOSINOPHIL # BLD AUTO: 0.2 K/UL
EOSINOPHIL NFR BLD: 4 %
ERYTHROCYTE [DISTWIDTH] IN BLOOD BY AUTOMATED COUNT: 14.8 %
EST. GFR  (AFRICAN AMERICAN): 45 ML/MIN/1.73 M^2
EST. GFR  (NON AFRICAN AMERICAN): 39 ML/MIN/1.73 M^2
GLUCOSE SERPL-MCNC: 98 MG/DL
HCT VFR BLD AUTO: 36.9 %
HGB BLD-MCNC: 12.3 G/DL
LYMPHOCYTES # BLD AUTO: 2.6 K/UL
LYMPHOCYTES NFR BLD: 45.6 %
MCH RBC QN AUTO: 30.7 PG
MCHC RBC AUTO-ENTMCNC: 33.3 G/DL
MCV RBC AUTO: 92 FL
MONOCYTES # BLD AUTO: 0.5 K/UL
MONOCYTES NFR BLD: 8.3 %
NEUTROPHILS # BLD AUTO: 2.4 K/UL
NEUTROPHILS NFR BLD: 41.9 %
PLATELET # BLD AUTO: 248 K/UL
PMV BLD AUTO: 10.4 FL
POTASSIUM SERPL-SCNC: 4.9 MMOL/L
RBC # BLD AUTO: 4.01 M/UL
SODIUM SERPL-SCNC: 134 MMOL/L
WBC # BLD AUTO: 5.75 K/UL

## 2017-09-18 PROCEDURE — 85025 COMPLETE CBC W/AUTO DIFF WBC: CPT

## 2017-09-18 PROCEDURE — 80048 BASIC METABOLIC PNL TOTAL CA: CPT

## 2017-09-18 RX ORDER — AMLODIPINE BESYLATE 10 MG/1
10 TABLET ORAL DAILY
COMMUNITY

## 2017-09-18 RX ORDER — CLOPIDOGREL BISULFATE 75 MG/1
75 TABLET ORAL DAILY
COMMUNITY

## 2017-09-18 NOTE — PRE ADMISSION SCREENING
EKG reviewed by Dr. Hurt.  Requesting Cardiac Clearance.  Patient's PCP called for cardiac referral.

## 2017-09-18 NOTE — DISCHARGE INSTRUCTIONS
Your surgery is scheduled for _Wednesday Sept. 20, 2017___.    Call [phone] between 2 p.m. and 5 p.m. on   _Tuesday___ to find out your arrival time for the day of your surgery.      Please report to SAME DAY SURGERY UNIT on the 2nd FLOOR at _______ a.m.  Use front door entrance. The doors open at 0530 am.          INSTRUCTIONS IMPORTANT!!!  ¨ Do not eat or drink after 12 midnight-including water. OK to brush teeth, no   gum, candy or mints!    ¨ Take only these medicines with a small swallow of water-morning of surgery.  Take Amlodipine and Carvedilol with swallow of water         _x___  Prep instructions:    SHOWER     _x___  Please shower using Hibiclens soap the night before AND  the morning of your surgery/procedure. Do not use Hibiclens on your face or genitals       x        If your surgery is around your belly button ([place]) be sure to wash inside your belly button also. Rinse hibiclens off completely.  _x___  No shaving of procedural area at least 4-5 days before surgery due to  increased risk of skin irritation and/or possible infection.  _x___  No powder, lotions or creams to your body.  _x___  You may wear only deodorant on the day of surgery.  _x___  Please remove all jewelry, including piercings and leave at home.  _x___  No money or valuables needed. Please leave at home.  You may bring your  cell phone.    _x___  If going home the same day, arrange for a ride home. You will not be able to   drive if Anesthesia was used.    _x___  Wear loose fitting clothing. Allow for dressings, bandages.  _x___  Stop Aspirin, Ibuprofen, Motrin and Aleve at least 3-5 days before  surgery, unless otherwise instructed by your doctor, or the nurse.              You MAY use Tylenol/acetaminophen until day of surgery.    _x___  Call MD for temperature above 101 degrees.        _x___ Stop taking any Fish Oil supplement or any Vitamins that contain Vitamin E at least 5 days prior to surgery.          I have read or had  read and explained to me, and understand the above information.  Additional comments or instructions:Please call   [phone] if you have any questions regarding the instructions above.

## 2017-09-18 NOTE — PRE ADMISSION SCREENING
Katie Idalia called back.  Patient has appointment to see Cardiologist on 9-19-17.  Office to call and make arraignments with patient.

## 2017-09-18 NOTE — PRE-PROCEDURE INSTRUCTIONS
Pre-operative instructions, medication directives and pain scales reviewed with patient.  Instructed to wash with Hibiclens prior to surgery as directed. All questions the patient had  were answered. Re-assurance about surgical procedure and day of surgery routine given as needed. The patient verbalized understanding of the pre-op instructions.

## 2017-09-19 ENCOUNTER — ANESTHESIA EVENT (OUTPATIENT)
Dept: SURGERY | Facility: HOSPITAL | Age: 82
End: 2017-09-19
Payer: MEDICARE

## 2017-09-20 ENCOUNTER — ANESTHESIA (OUTPATIENT)
Dept: SURGERY | Facility: HOSPITAL | Age: 82
End: 2017-09-20
Payer: MEDICARE

## 2017-09-20 ENCOUNTER — HOSPITAL ENCOUNTER (OUTPATIENT)
Facility: HOSPITAL | Age: 82
Discharge: HOME | End: 2017-09-20
Attending: SURGERY | Admitting: SURGERY
Payer: MEDICARE

## 2017-09-20 ENCOUNTER — SURGERY (OUTPATIENT)
Age: 82
End: 2017-09-20

## 2017-09-20 VITALS
DIASTOLIC BLOOD PRESSURE: 60 MMHG | SYSTOLIC BLOOD PRESSURE: 130 MMHG | WEIGHT: 178 LBS | RESPIRATION RATE: 14 BRPM | BODY MASS INDEX: 27.94 KG/M2 | HEART RATE: 72 BPM | OXYGEN SATURATION: 97 % | HEIGHT: 67 IN | TEMPERATURE: 97 F

## 2017-09-20 DIAGNOSIS — K80.51 CALCULUS OF BILE DUCT WITHOUT CHOLECYSTITIS WITH OBSTRUCTION: Primary | ICD-10-CM

## 2017-09-20 DIAGNOSIS — K80.50 BILIARY COLIC: ICD-10-CM

## 2017-09-20 PROCEDURE — 71000039 HC RECOVERY, EACH ADD'L HOUR: Performed by: SURGERY

## 2017-09-20 PROCEDURE — 88304 TISSUE EXAM BY PATHOLOGIST: CPT | Mod: 26,,, | Performed by: PATHOLOGY

## 2017-09-20 PROCEDURE — 25000003 PHARM REV CODE 250: Performed by: NURSE ANESTHETIST, CERTIFIED REGISTERED

## 2017-09-20 PROCEDURE — 63600175 PHARM REV CODE 636 W HCPCS: Performed by: ANESTHESIOLOGY

## 2017-09-20 PROCEDURE — 25000003 PHARM REV CODE 250: Performed by: SURGERY

## 2017-09-20 PROCEDURE — 37000009 HC ANESTHESIA EA ADD 15 MINS: Performed by: SURGERY

## 2017-09-20 PROCEDURE — 27201423 OPTIME MED/SURG SUP & DEVICES STERILE SUPPLY: Performed by: SURGERY

## 2017-09-20 PROCEDURE — 88304 TISSUE EXAM BY PATHOLOGIST: CPT | Performed by: PATHOLOGY

## 2017-09-20 PROCEDURE — S0020 INJECTION, BUPIVICAINE HYDRO: HCPCS | Performed by: SURGERY

## 2017-09-20 PROCEDURE — 71000033 HC RECOVERY, INTIAL HOUR: Performed by: SURGERY

## 2017-09-20 PROCEDURE — 71000015 HC POSTOP RECOV 1ST HR: Performed by: SURGERY

## 2017-09-20 PROCEDURE — 63600175 PHARM REV CODE 636 W HCPCS: Performed by: SURGERY

## 2017-09-20 PROCEDURE — D9220A PRA ANESTHESIA: Mod: CRNA,,, | Performed by: NURSE ANESTHETIST, CERTIFIED REGISTERED

## 2017-09-20 PROCEDURE — 25000003 PHARM REV CODE 250: Performed by: ANESTHESIOLOGY

## 2017-09-20 PROCEDURE — 36000709 HC OR TIME LEV III EA ADD 15 MIN: Performed by: SURGERY

## 2017-09-20 PROCEDURE — D9220A PRA ANESTHESIA: Mod: ANES,,, | Performed by: ANESTHESIOLOGY

## 2017-09-20 PROCEDURE — 71000016 HC POSTOP RECOV ADDL HR: Performed by: SURGERY

## 2017-09-20 PROCEDURE — S0077 INJECTION, CLINDAMYCIN PHOSP: HCPCS | Performed by: SURGERY

## 2017-09-20 PROCEDURE — 63600175 PHARM REV CODE 636 W HCPCS: Performed by: NURSE ANESTHETIST, CERTIFIED REGISTERED

## 2017-09-20 PROCEDURE — 36000708 HC OR TIME LEV III 1ST 15 MIN: Performed by: SURGERY

## 2017-09-20 PROCEDURE — 37000008 HC ANESTHESIA 1ST 15 MINUTES: Performed by: SURGERY

## 2017-09-20 RX ORDER — NEOSTIGMINE METHYLSULFATE 1 MG/ML
INJECTION, SOLUTION INTRAVENOUS
Status: DISCONTINUED | OUTPATIENT
Start: 2017-09-20 | End: 2017-09-20

## 2017-09-20 RX ORDER — LIDOCAINE HYDROCHLORIDE AND EPINEPHRINE 20; 10 MG/ML; UG/ML
INJECTION, SOLUTION INFILTRATION; PERINEURAL
Status: DISCONTINUED | OUTPATIENT
Start: 2017-09-20 | End: 2017-09-20 | Stop reason: HOSPADM

## 2017-09-20 RX ORDER — LIDOCAINE HCL/PF 100 MG/5ML
SYRINGE (ML) INTRAVENOUS
Status: DISCONTINUED | OUTPATIENT
Start: 2017-09-20 | End: 2017-09-20

## 2017-09-20 RX ORDER — ACETAMINOPHEN 325 MG/1
650 TABLET ORAL EVERY 6 HOURS PRN
Status: DISCONTINUED | OUTPATIENT
Start: 2017-09-20 | End: 2017-09-20 | Stop reason: HOSPADM

## 2017-09-20 RX ORDER — SODIUM CHLORIDE 0.9 % (FLUSH) 0.9 %
3 SYRINGE (ML) INJECTION
Status: DISCONTINUED | OUTPATIENT
Start: 2017-09-20 | End: 2017-09-20 | Stop reason: HOSPADM

## 2017-09-20 RX ORDER — LIDOCAINE HYDROCHLORIDE 10 MG/ML
1 INJECTION, SOLUTION EPIDURAL; INFILTRATION; INTRACAUDAL; PERINEURAL ONCE
Status: DISCONTINUED | OUTPATIENT
Start: 2017-09-20 | End: 2017-09-20 | Stop reason: HOSPADM

## 2017-09-20 RX ORDER — HYDROCODONE BITARTRATE AND ACETAMINOPHEN 5; 325 MG/1; MG/1
1 TABLET ORAL EVERY 4 HOURS PRN
Status: DISCONTINUED | OUTPATIENT
Start: 2017-09-20 | End: 2017-09-20 | Stop reason: HOSPADM

## 2017-09-20 RX ORDER — SUCCINYLCHOLINE CHLORIDE 20 MG/ML
INJECTION INTRAMUSCULAR; INTRAVENOUS
Status: DISCONTINUED | OUTPATIENT
Start: 2017-09-20 | End: 2017-09-20

## 2017-09-20 RX ORDER — PHENYLEPHRINE HYDROCHLORIDE 10 MG/ML
INJECTION INTRAVENOUS
Status: DISCONTINUED | OUTPATIENT
Start: 2017-09-20 | End: 2017-09-20

## 2017-09-20 RX ORDER — ONDANSETRON 2 MG/ML
INJECTION INTRAMUSCULAR; INTRAVENOUS
Status: DISCONTINUED | OUTPATIENT
Start: 2017-09-20 | End: 2017-09-20

## 2017-09-20 RX ORDER — HYDRALAZINE HYDROCHLORIDE 20 MG/ML
10 INJECTION INTRAMUSCULAR; INTRAVENOUS
Status: DISCONTINUED | OUTPATIENT
Start: 2017-09-20 | End: 2017-09-20 | Stop reason: HOSPADM

## 2017-09-20 RX ORDER — HYDROCODONE BITARTRATE AND ACETAMINOPHEN 5; 325 MG/1; MG/1
1-2 TABLET ORAL EVERY 4 HOURS PRN
Qty: 40 TABLET | Refills: 0 | Status: SHIPPED | OUTPATIENT
Start: 2017-09-20 | End: 2017-10-09

## 2017-09-20 RX ORDER — HYDROCODONE BITARTRATE AND ACETAMINOPHEN 10; 325 MG/1; MG/1
1 TABLET ORAL EVERY 4 HOURS PRN
Status: DISCONTINUED | OUTPATIENT
Start: 2017-09-20 | End: 2017-09-20 | Stop reason: HOSPADM

## 2017-09-20 RX ORDER — CLINDAMYCIN PHOSPHATE 600 MG/50ML
600 INJECTION, SOLUTION INTRAVENOUS
Status: COMPLETED | OUTPATIENT
Start: 2017-09-20 | End: 2017-09-20

## 2017-09-20 RX ORDER — PROPOFOL 10 MG/ML
VIAL (ML) INTRAVENOUS
Status: DISCONTINUED | OUTPATIENT
Start: 2017-09-20 | End: 2017-09-20

## 2017-09-20 RX ORDER — BUPIVACAINE HYDROCHLORIDE 5 MG/ML
INJECTION, SOLUTION PERINEURAL
Status: DISCONTINUED | OUTPATIENT
Start: 2017-09-20 | End: 2017-09-20 | Stop reason: HOSPADM

## 2017-09-20 RX ORDER — HYDROMORPHONE HYDROCHLORIDE 2 MG/ML
0.2 INJECTION, SOLUTION INTRAMUSCULAR; INTRAVENOUS; SUBCUTANEOUS EVERY 5 MIN PRN
Status: DISCONTINUED | OUTPATIENT
Start: 2017-09-20 | End: 2017-09-20 | Stop reason: HOSPADM

## 2017-09-20 RX ORDER — ONDANSETRON 2 MG/ML
4 INJECTION INTRAMUSCULAR; INTRAVENOUS EVERY 8 HOURS PRN
Status: DISCONTINUED | OUTPATIENT
Start: 2017-09-20 | End: 2017-09-20 | Stop reason: HOSPADM

## 2017-09-20 RX ORDER — SODIUM CHLORIDE 9 MG/ML
INJECTION, SOLUTION INTRAVENOUS CONTINUOUS
Status: DISCONTINUED | OUTPATIENT
Start: 2017-09-20 | End: 2017-09-20 | Stop reason: HOSPADM

## 2017-09-20 RX ORDER — ROCURONIUM BROMIDE 10 MG/ML
INJECTION, SOLUTION INTRAVENOUS
Status: DISCONTINUED | OUTPATIENT
Start: 2017-09-20 | End: 2017-09-20

## 2017-09-20 RX ORDER — FENTANYL CITRATE 50 UG/ML
INJECTION, SOLUTION INTRAMUSCULAR; INTRAVENOUS
Status: DISCONTINUED | OUTPATIENT
Start: 2017-09-20 | End: 2017-09-20

## 2017-09-20 RX ORDER — GLYCOPYRROLATE 0.2 MG/ML
INJECTION INTRAMUSCULAR; INTRAVENOUS
Status: DISCONTINUED | OUTPATIENT
Start: 2017-09-20 | End: 2017-09-20

## 2017-09-20 RX ORDER — SODIUM CHLORIDE, SODIUM LACTATE, POTASSIUM CHLORIDE, CALCIUM CHLORIDE 600; 310; 30; 20 MG/100ML; MG/100ML; MG/100ML; MG/100ML
INJECTION, SOLUTION INTRAVENOUS CONTINUOUS
Status: DISCONTINUED | OUTPATIENT
Start: 2017-09-20 | End: 2017-09-20 | Stop reason: HOSPADM

## 2017-09-20 RX ADMIN — PHENYLEPHRINE HYDROCHLORIDE 100 MCG: 10 INJECTION INTRAVENOUS at 10:09

## 2017-09-20 RX ADMIN — PHENYLEPHRINE HYDROCHLORIDE 200 MCG: 10 INJECTION INTRAVENOUS at 09:09

## 2017-09-20 RX ADMIN — PHENYLEPHRINE HYDROCHLORIDE 100 MCG: 10 INJECTION INTRAVENOUS at 09:09

## 2017-09-20 RX ADMIN — SUCCINYLCHOLINE CHLORIDE 120 MG: 20 INJECTION, SOLUTION INTRAMUSCULAR; INTRAVENOUS at 09:09

## 2017-09-20 RX ADMIN — NEOSTIGMINE METHYLSULFATE 3.5 MG: 1 INJECTION INTRAVENOUS at 11:09

## 2017-09-20 RX ADMIN — ONDANSETRON 4 MG: 2 INJECTION, SOLUTION INTRAMUSCULAR; INTRAVENOUS at 01:09

## 2017-09-20 RX ADMIN — GLYCOPYRROLATE 0.2 MG: 0.2 INJECTION, SOLUTION INTRAMUSCULAR; INTRAVENOUS at 09:09

## 2017-09-20 RX ADMIN — LIDOCAINE HYDROCHLORIDE 60 MG: 20 INJECTION, SOLUTION INTRAVENOUS at 09:09

## 2017-09-20 RX ADMIN — ROCURONIUM BROMIDE 20 MG: 10 INJECTION, SOLUTION INTRAVENOUS at 09:09

## 2017-09-20 RX ADMIN — GLYCOPYRROLATE 0.4 MG: 0.2 INJECTION, SOLUTION INTRAMUSCULAR; INTRAVENOUS at 11:09

## 2017-09-20 RX ADMIN — LIDOCAINE HYDROCHLORIDE,EPINEPHRINE BITARTRATE 20 ML: 20; .01 INJECTION, SOLUTION INFILTRATION; PERINEURAL at 09:09

## 2017-09-20 RX ADMIN — SODIUM CHLORIDE, SODIUM LACTATE, POTASSIUM CHLORIDE, AND CALCIUM CHLORIDE: .6; .31; .03; .02 INJECTION, SOLUTION INTRAVENOUS at 09:09

## 2017-09-20 RX ADMIN — SODIUM CHLORIDE, SODIUM LACTATE, POTASSIUM CHLORIDE, AND CALCIUM CHLORIDE: .6; .31; .03; .02 INJECTION, SOLUTION INTRAVENOUS at 10:09

## 2017-09-20 RX ADMIN — PROPOFOL 100 MG: 10 INJECTION, EMULSION INTRAVENOUS at 09:09

## 2017-09-20 RX ADMIN — HYDRALAZINE HYDROCHLORIDE 10 MG: 20 INJECTION INTRAMUSCULAR; INTRAVENOUS at 11:09

## 2017-09-20 RX ADMIN — CLINDAMYCIN PHOSPHATE 600 MG: 12 INJECTION, SOLUTION INTRAVENOUS at 09:09

## 2017-09-20 RX ADMIN — BUPIVACAINE HYDROCHLORIDE 20 ML: 5 INJECTION, SOLUTION PERINEURAL at 09:09

## 2017-09-20 RX ADMIN — FENTANYL CITRATE 50 MCG: 50 INJECTION INTRAMUSCULAR; INTRAVENOUS at 09:09

## 2017-09-20 RX ADMIN — ONDANSETRON 4 MG: 2 INJECTION, SOLUTION INTRAMUSCULAR; INTRAVENOUS at 09:09

## 2017-09-20 NOTE — H&P (VIEW-ONLY)
History & Physical    SUBJECTIVE:     History of Present Illness:  Patient is a 83 y.o. male presents with complaints of right inguinal hernia. Onset of symptoms was gradual starting 2 months ago with gradually worsening course since that time. Patient denies redness or problems with reducing it. Symptoms improve with wearing a belt that keeps in from occuring.  Otherwise it is painful for him to walk.     He recently had a hospital admission for choledocholithiasis at the end of August. He had an ERCP and they placed a stent in his bile duct given his Plavix use and a stone was left in place. He denies any recent nausea or vomiting. He is eating well. He is moving his bowels without problems. Denies any abdominal pain associated with this.  After reviewing his hospital information, he was supposed to have an ERCP completed as an outpatient and then follow up with surgery for interval cholecystectomy.    Chief Complaint   Patient presents with    Hernia       Review of patient's allergies indicates:   Allergen Reactions    Penicillins Rash       Current Outpatient Prescriptions   Medication Sig Dispense Refill    amlodipine (NORVASC) 10 MG tablet Take 10 mg by mouth once daily.      aspirin 81 MG Chew Take 81 mg by mouth once daily.      carvedilol (COREG) 12.5 MG tablet Take 1 tablet (12.5 mg total) by mouth 2 (two) times daily. 60 tablet 11    ergocalciferol (ERGOCALCIFEROL) 50,000 unit Cap Take 50,000 Units by mouth every 7 days.      hydrochlorothiazide (HYDRODIURIL) 25 MG tablet Take 1 tablet (25 mg total) by mouth once daily. 30 tablet 11    lisinopril 10 MG tablet Take 4 tablets by mouth once daily.      melatonin 3 mg TbDL Take by mouth.      nitroGLYCERIN (NITROSTAT) 0.4 MG SL tablet Place 1 tablet (0.4 mg total) under the tongue every 5 (five) minutes as needed for Chest pain (if more than three doses needed, seek urgent medical attention). 30 tablet 0    pantoprazole (PROTONIX) 40 MG tablet  "Take 40 mg by mouth once daily.      polyethylene glycol (GLYCOLAX) 17 gram/dose powder Take 17 g by mouth daily as needed (constipation). 1 Bottle 0    pravastatin (PRAVACHOL) 40 MG tablet Take 40 mg by mouth once daily.       No current facility-administered medications for this visit.        Past Medical History:   Diagnosis Date    Anticoagulant long-term use     Coronary artery disease     GERD (gastroesophageal reflux disease)     Hypertension     PAD (peripheral artery disease)     s/p stent     Past Surgical History:   Procedure Laterality Date    HERNIA REPAIR      stents       No family history on file.  Social History   Substance Use Topics    Smoking status: Former Smoker     Quit date: 12/26/1995    Smokeless tobacco: Former User      Comment: pt reports stopped smoking 30 years ago    Alcohol use No        Review of Systems:  Review of Systems  Comprehensive ROS completed and negative other than that stated in HPI.    OBJECTIVE:     Vital Signs (Most Recent)  Pulse: (!) 52 (09/14/17 0933)  BP: 127/63 (09/14/17 0933)  5' 7" (1.702 m)  78.9 kg (174 lb)     Physical Exam:  Physical Exam  GEN NAD, AAO  EYES no scleral icterus  HEAD At, [place]  NECK supple, no lymphadenopathy  CV RRR, no murmurs  LUNGS CTABL  ABD soft, Nt, Nd, scar, large right inguinal hernia present, reducible but painful, no erythema or induration     Laboratory  CBC: Reviewed  CMP: Reviewed        Diagnostic Results:  CT: Reviewed  Large inguinal hernia    ASSESSMENT/PLAN:   84 y/o male with Right inguinal hernia and also h/o choledocholithiasis and cholangitis    PLAN:Plan   Schedule for Lap kaushal with possible IOC  Still needs to see GI for stent removal.  Gallbladder issue more pressing than Hernia at this time.     "

## 2017-09-20 NOTE — DISCHARGE INSTRUCTIONS
DRESSING:  You have Dermaflex covering your incision.  It is a clear liquid that dries like super glue.  Do not try to remove it.  It will flake off over time.  You may wash it with soap and water in the shower.  Do not apply any creams or lotion on it.    BATHING:    You may shower, but no tub baths until you see the doctor.    ACTIVITY LEVEL: If you have received sedation or an anesthetic, you may feel sleepy for several hours. Rest until you are more awake. Gradually resume your normal activities. No driving or alcoholic beverages for 24 hours.     No driving, alcoholic beverages or signing legal documents for next 24 hours or while taking pain medication    DIET: You may resume your home diet. If nausea is present, increase your diet gradually with fluids and bland foods.    Medications: Pain medication should be taken only if needed and as directed. If antibiotics are prescribed, the medication should be taken until completed. You will be given an updated list of you medications.    CALL THE DOCTOR:    For any obvious bleeding (some dried blood over the incision is normal).                          Redness, swelling, foul smell around incision or fever over            101.                    Shortness of breath.   Persistent pain or nausea not relieved by medication.      Discharge Instructions for Laparoscopic Cholecystectomy  You have had a procedure known as a laparoscopic cholecystectomy. A laparoscopic cholecystectomy is a procedure to remove your gallbladder. People who have this procedure usually recover more quickly and have less pain than with open gallbladder surgery (called open cholecystectomy). Many surgeons recommend a low-fat diet, avoiding fried food in particular, for the first month after surgery.   You can live a full and healthy life without your gallbladder. This includes eating the foods and doing the things you enjoyed before your gallbladder problems started.  Home  care  Recommendations for home care include the following:   · Ask someone to drive you to your appointments for the next 3 days. Dont drive until you are no longer taking pain medicine and are able to step on the brake pedal without hesitation.   · Wash the skin around your incision daily with mild soap and water. It's OK to shower the day after your surgery.  · Eat your regular diet. It is wise to stay away from rich, greasy, or spicy food for a few days.  · Remember, it takes at least 1 week for you to get most of your strength and energy back.  · Make an office visit to talk to your healthcare provider if the following symptoms dont go away within a week after your surgery:  ¨ Fatigue  ¨ Pain around the incision  ¨ Diarrhea or constipation  ¨ Loss of appetite     When to call your healthcare provider  Call your healthcare provider immediately if you have any of the following:  · Yellowing of your eyes or skin (jaundice)  · Chills  · Fever of 100.4°F (38.0°C) or higher, or as directed by your healthcare provider   · Redness, swelling, increasing pain, pus, or a foul smell at the incision site  · Dark or rust-colored urine  · Stool that is joshua-colored or light in color instead of brown  · Increasing belly pain  · Rectal bleeding  · Leg swelling or shortness of breath    Fall Prevention  Millions of people fall every year and injure themselves. You may have had anesthesia or sedation which may increase your risk of falling. You may have health issues that put you at an increased risk of falling.     Here are ways to reduce your risk of falling.  ·   · Make your home safe by keeping walkways clear of objects you may trip over.  · Use non-slip pads under rugs. Do not use area rugs or small throw rugs.  · Use non-slip mats in bathtubs and showers.  · Install handrails and lights on staircases.  · Do not walk in poorly lit areas.  · Do not stand on chairs or wobbly ladders.  · Use caution when reaching overhead or  looking upward. This position can cause a loss of balance.  · Be sure your shoes fit properly, have non-slip bottoms and are in good condition.   · Wear shoes both inside and out. Avoid going barefoot or wearing slippers.  · Be cautious when going up and down stairs, curbs, and when walking on uneven sidewalks.  · If your balance is poor, consider using a cane or walker.  · If your fall was related to alcohol use, stop or limit alcohol intake.   · If your fall was related to use of sleeping medicines, talk to your doctor about this. You may need to reduce your dosage at bedtime if you awaken during the night to go to the bathroom.    · To reduce the need for nighttime bathroom trips:  ¨ Avoid drinking fluids for several hours before going to bed  ¨ Empty your bladder before going to bed  ¨ Men can keep a urinal at the bedside  · Stay as active as you can. Balance, flexibility, strength, and endurance all come from exercise. They all play a role in preventing falls. Ask your healthcare provider which types of activity are right for you.  · Get your vision checked on a regular basis.  · If you have pets, know where they are before you stand up or walk so you don't trip over them.  · Use night lights.

## 2017-09-20 NOTE — OP NOTE
Laparoscopic Cholecystectomy Procedure Note    Pre-operative Diagnosis: Choledocholithiasis    Post-operative Diagnosis: Same    Surgery Date: 9/20/2017     Surgeon: Ted Ashby     Assistants: Magaly Rae MD, PGY4    Anesthesia: General endotracheal anesthesia    ASA Class: 3      Indications: This patient presents with symptomatic gallbladder disease and will undergo laparoscopic cholecystectomy.    The patient was seen again in the Holding Room. The risks and benefits, including but not limited to common bile duct injury were explained to the patient, who acknowledges these risks and wishes to proceed.    Procedure Details   The patient was taken to Operating Room, identified as Mandeep Guy and the procedure verified as Laparoscopic Cholecystectomy. A Time Out was held and the above information confirmed.    Prior to the induction of general anesthesia, antibiotic prophylaxis was administered. General endotracheal anesthesia was then administered and tolerated well. After the induction, the abdomen was prepped in the usual sterile fashion. The patient was positioned in the supine position with the arms extended.    All ports were injected with a combination of short and long acting local anesthetic prior to incision.  An incision was made in the umbilicus.  The skin edges were grasped with penetrating towel clips and used to retract the abdominal wall upward.  A Veress needle was inserted into the peritoneal cavity.  Initial pressures were less than 5 mm Hg.  A pneumoperitoneum to 15 mm Hg was obtained.  The abdomen was then entered under direct visualization using an optical viewing 11 mm trochar and a 0 degree laparoscope.  The underlying bowel was inspected and found to be free of injury.  The remaining ports were placed under direct visualization.  All were 5 mm ports: one in the subxiphoid area, one in the right midclavicular line, and one in the right anterior axillary line.    A locking grasper was  introduced through the most lateral port and used to retract the dome of the gallbladder up and over the edge of the liver.  A Isaac grasper was used to retract the infundibulum of the gallbladder laterally.  The triangle of Calot was opened using electrocautery and dissected using Maryland forceps.  Once a single duct and single artery were seen entering the gallbladder, the cystic duct was clipped once proximally, twice distally and divided.  The cystic artery was clipped once proximally, once distally and divided.  The gallbladder was then removed from the gallbladder fossa using electrocautery and removed through the umbilicus.    The right upper quadrant was irrigated until clear.  Hemostasis on the gallbladder fossa was achieved with electrocautery.  The pneumoperitoneum was then expelled from the abdomen, and all ports were removed.  The umbilical fascia was reapproximated with a single figure-of-eight 0 vicryl suture.  The skin over all incisions were closed using 4-0 monocryl deep dermal sutures.  Dermabond was applied and the patient was aroused and transferred to the recovery room in stable condition.    Instrument, sponge, and needle counts were correct at closure and at the conclusion of the case.  There were no immediate complications.    Findings:  Cholecystitis with Cholelithiasis    Estimated Blood Loss: Minimal           Drains: none           Total IV Fluids: see anesthesia records           Specimens: Gallbladder             Complications: None; patient tolerated the procedure well.           Disposition: PACU - hemodynamically stable.           Condition: stable

## 2017-09-20 NOTE — TRANSFER OF CARE
"Anesthesia Transfer of Care Note    Patient: Mandeep Guy    Procedure(s) Performed: Procedure(s) (LRB):  CHOLECYSTECTOMY-LAPAROSCOPIC (N/A)    Patient location: PACU    Anesthesia Type: general    Transport from OR: Transported from OR on room air with adequate spontaneous ventilation    Post pain: adequate analgesia    Post assessment: no apparent anesthetic complications    Post vital signs: stable    Level of consciousness: awake    Nausea/Vomiting: no nausea/vomiting    Complications: none    Transfer of care protocol was followed      Last vitals:   Visit Vitals  BP (!) 216/100 (BP Location: Right arm, Patient Position: Lying)   Pulse 66   Temp 36.6 °C (97.9 °F) (Axillary)   Resp 14   Ht 5' 7" (1.702 m)   Wt 80.7 kg (178 lb)   SpO2 98%   BMI 27.88 kg/m²     "

## 2017-09-20 NOTE — ANESTHESIA PREPROCEDURE EVALUATION
09/20/2017  Mandeep Guy is a 83 y.o., male.    Pre-op Assessment    I have reviewed the Patient Summary Reports.     I have reviewed the Nursing Notes.   I have reviewed the Medications.     Review of Systems  Anesthesia Hx:  No problems with previous Anesthesia Denies Hx of Anesthetic complications  Neg history of prior surgery. Denies Family Hx of Anesthesia complications.   Denies Personal Hx of Anesthesia complications.   Social:  Non-Smoker, No Alcohol Use    Hematology/Oncology:  Hematology Normal   Oncology Normal     EENT/Dental:EENT/Dental Normal   Cardiovascular:   Exercise tolerance: good Hypertension CAD      Pulmonary:  Pulmonary Normal    Renal/:   Chronic Renal Disease    Hepatic/GI:   GERD    Musculoskeletal:  Musculoskeletal Normal    Neurological:  Neurology Normal    Endocrine:  Endocrine Normal    Dermatological:  Skin Normal    Psych:  Psychiatric Normal           Physical Exam  General:  Well nourished    Airway/Jaw/Neck:  Airway Findings: Mouth Opening: Normal General Airway Assessment: Adult  Mallampati: II  TM Distance: Normal, at least 6 cm         Dental:  Dental Findings: Upper Dentures   Chest/Lungs:  Chest/Lungs Clear    Heart/Vascular:  Heart Findings: Normal Heart murmur: negative       Mental Status:  Mental Status Findings:  Cooperative, Alert and Oriented         Anesthesia Plan  Type of Anesthesia, risks & benefits discussed:  Anesthesia Type:  general  Patient's Preference:   Intra-op Monitoring Plan: standard ASA monitors  Intra-op Monitoring Plan Comments:   Post Op Pain Control Plan: multimodal analgesia, per primary service following discharge from PACU and IV/PO Opioids PRN  Post Op Pain Control Plan Comments:   Induction:   IV  Beta Blocker:  Patient is on a Beta-Blocker and has received one dose within the past 24 hours (No further documentation required).        Informed Consent: Patient understands risks and agrees with Anesthesia plan.  Questions answered. Anesthesia consent signed with patient.  ASA Score: 3     Day of Surgery Review of History & Physical:    H&P update referred to the surgeon.         Ready For Surgery From Anesthesia Perspective.

## 2017-09-20 NOTE — DISCHARGE SUMMARY
[facility]  Discharge Note  Short Stay    Admit Date: 9/20/2017    Discharge Date and Time: 9/20/17    Attending Physician: Ted Ashby MD     Discharge Provider: Ted Ashby    Diagnoses:  Active Hospital Problems    Diagnosis  POA    *Calculus of bile duct without cholecystitis with obstruction [K80.51]  Yes    Biliary colic [K80.50]  Yes    Acute biliary pancreatitis [K85.10]  Yes    Gram-negative bacteremia [R78.81]  Yes    CAD (coronary artery disease) [I25.10]  Yes     Chronic      Resolved Hospital Problems    Diagnosis Date Resolved POA   No resolved problems to display.       Discharged Condition: good    Hospital Course: Patient was admitted for an outpatient procedure and tolerated the procedure well with no complications.    Final Diagnoses: Same as principal problem.    Disposition: Home or Self Care    Follow up/Patient Instructions:    Medications:  Reconciled Home Medications:   Current Discharge Medication List      START taking these medications    Details   hydrocodone-acetaminophen 5-325mg (NORCO) 5-325 mg per tablet Take 1-2 tablets by mouth every 4 (four) hours as needed for Pain.  Qty: 40 tablet, Refills: 0         CONTINUE these medications which have NOT CHANGED    Details   amlodipine (NORVASC) 10 MG tablet Take 10 mg by mouth once daily. Take am of surgery 9-20-17.      carvedilol (COREG) 12.5 MG tablet Take 1 tablet (12.5 mg total) by mouth 2 (two) times daily.  Qty: 60 tablet, Refills: 11      hydrochlorothiazide (HYDRODIURIL) 25 MG tablet Take 1 tablet (25 mg total) by mouth once daily.  Qty: 30 tablet, Refills: 11      lisinopril 10 MG tablet Take 40 mg by mouth once daily.       aspirin 81 MG Chew Take 81 mg by mouth once daily.      clopidogrel (PLAVIX) 75 mg tablet Take 75 mg by mouth once daily.      ergocalciferol (ERGOCALCIFEROL) 50,000 unit Cap Take 50,000 Units by mouth every 7 days.      nitroGLYCERIN (NITROSTAT) 0.4 MG SL tablet Place 1 tablet (0.4 mg  total) under the tongue every 5 (five) minutes as needed for Chest pain (if more than three doses needed, seek urgent medical attention).  Qty: 30 tablet, Refills: 0      pantoprazole (PROTONIX) 40 MG tablet Take 40 mg by mouth once daily.      polyethylene glycol (GLYCOLAX) 17 gram/dose powder Take 17 g by mouth daily as needed (constipation).  Qty: 1 Bottle, Refills: 0      pravastatin (PRAVACHOL) 40 MG tablet Take 40 mg by mouth once daily.             Discharge Procedure Orders  Diet general     Lifting restrictions   Order Comments: No lifting greater than 10 pounds for 4 weeks     Call MD for:  temperature >100.4     Call MD for:  persistent nausea and vomiting     Call MD for:  severe uncontrolled pain     Call MD for:  difficulty breathing, headache or visual disturbances     Call MD for:  redness, tenderness, or signs of infection (pain, swelling, redness, odor or green/yellow discharge around incision site)     Call MD for:  hives     Call MD for:  persistent dizziness or light-headedness     Call MD for:  extreme fatigue     No dressing needed       Follow-up Information     Follow up In 2 weeks.                 Discharge Procedure Orders (must include Diet, Follow-up, Activity):    Discharge Procedure Orders (must include Diet, Follow-up, Activity)  Diet general     Lifting restrictions   Order Comments: No lifting greater than 10 pounds for 4 weeks     Call MD for:  temperature >100.4     Call MD for:  persistent nausea and vomiting     Call MD for:  severe uncontrolled pain     Call MD for:  difficulty breathing, headache or visual disturbances     Call MD for:  redness, tenderness, or signs of infection (pain, swelling, redness, odor or green/yellow discharge around incision site)     Call MD for:  hives     Call MD for:  persistent dizziness or light-headedness     Call MD for:  extreme fatigue     No dressing needed

## 2017-09-20 NOTE — BRIEF OP NOTE
[facility]  Brief Operative Note     SUMMARY     Surgery Date: 9/20/2017     Surgeon(s) and Role:     * Ted Ashby MD - Primary    Assisting Surgeon: None    Pre-op Diagnosis:  Biliary colic [K80.50]    Post-op Diagnosis:  Post-Op Diagnosis Codes:     * Biliary colic [K80.50]    Procedure(s) (LRB):  CHOLECYSTECTOMY-LAPAROSCOPIC (N/A)    Anesthesia: General    Description of the findings of the procedure:     Findings/Key Components: critical view obtained, gallbladder resected from liver bed    Estimated Blood Loss: * No values recorded between 9/20/2017  9:45 AM and 9/20/2017 11:16 AM *         Specimens:   Specimen (12h ago through future)    Start     Ordered    09/20/17 0950  Specimen to Pathology - Surgery  Once     Comments:  Gallbladder      09/20/17 0949          Discharge Note    SUMMARY     Admit Date: 9/20/2017    Discharge Date and Time:  09/20/2017 11:29 AM    Hospital Course (synopsis of major diagnoses, care, treatment, and services provided during the course of the hospital stay): Patient presented to [facility] for elective cholecystectomy after having choledocholithiasis. Underwent Laparoscopic cholecystectomy without complication. Once he met discharge criteria he was allowed to go home with outpatient follow up.     Final Diagnosis: Post-Op Diagnosis Codes:     * Biliary colic [K80.50]    Disposition: Home or Self Care    Follow Up/Patient Instructions:   Follow up in 2 weeks with Dr. Ashby in Clinic. May take off OR dressings Friday, leave inner steri strips intact they will fall off spontaneously. May shower on Friday no baths or soaking in water or swimming in two weeks.     Medications:  Reconciled Home Medications:   Current Discharge Medication List      START taking these medications    Details   hydrocodone-acetaminophen 5-325mg (NORCO) 5-325 mg per tablet Take 1-2 tablets by mouth every 4 (four) hours as needed for Pain.  Qty: 40 tablet, Refills: 0         CONTINUE these  medications which have NOT CHANGED    Details   amlodipine (NORVASC) 10 MG tablet Take 10 mg by mouth once daily. Take am of surgery 9-20-17.      carvedilol (COREG) 12.5 MG tablet Take 1 tablet (12.5 mg total) by mouth 2 (two) times daily.  Qty: 60 tablet, Refills: 11      hydrochlorothiazide (HYDRODIURIL) 25 MG tablet Take 1 tablet (25 mg total) by mouth once daily.  Qty: 30 tablet, Refills: 11      lisinopril 10 MG tablet Take 40 mg by mouth once daily.       aspirin 81 MG Chew Take 81 mg by mouth once daily.      clopidogrel (PLAVIX) 75 mg tablet Take 75 mg by mouth once daily.      ergocalciferol (ERGOCALCIFEROL) 50,000 unit Cap Take 50,000 Units by mouth every 7 days.      nitroGLYCERIN (NITROSTAT) 0.4 MG SL tablet Place 1 tablet (0.4 mg total) under the tongue every 5 (five) minutes as needed for Chest pain (if more than three doses needed, seek urgent medical attention).  Qty: 30 tablet, Refills: 0      pantoprazole (PROTONIX) 40 MG tablet Take 40 mg by mouth once daily.      polyethylene glycol (GLYCOLAX) 17 gram/dose powder Take 17 g by mouth daily as needed (constipation).  Qty: 1 Bottle, Refills: 0      pravastatin (PRAVACHOL) 40 MG tablet Take 40 mg by mouth once daily.           No discharge procedures on file.

## 2017-09-22 NOTE — ANESTHESIA POSTPROCEDURE EVALUATION
"Anesthesia Post Evaluation    Patient: Mandeep Guy    Procedure(s) Performed: Procedure(s) (LRB):  CHOLECYSTECTOMY-LAPAROSCOPIC (N/A)    Final Anesthesia Type: general  Patient location during evaluation: PACU  Patient participation: Yes- Able to Participate  Level of consciousness: awake and alert, oriented and awake  Post-procedure vital signs: reviewed and stable  Airway patency: patent  PONV status at discharge: No PONV  Anesthetic complications: no      Cardiovascular status: blood pressure returned to baseline  Respiratory status: unassisted, spontaneous ventilation and room air  Hydration status: euvolemic  Follow-up not needed.        Visit Vitals  /60   Pulse 72   Temp 36.3 °C (97.4 °F) (Oral)   Resp 14   Ht 5' 7" (1.702 m)   Wt 80.7 kg (178 lb)   SpO2 97%   BMI 27.88 kg/m²       Pain/Simona Score: No Data Recorded      "

## 2017-09-23 ENCOUNTER — HOSPITAL ENCOUNTER (INPATIENT)
Facility: HOSPITAL | Age: 82
LOS: 2 days | Discharge: HOME | DRG: 390 | End: 2017-09-25
Attending: EMERGENCY MEDICINE | Admitting: SURGERY
Payer: MEDICARE

## 2017-09-23 DIAGNOSIS — K56.7 ILEUS: Primary | ICD-10-CM

## 2017-09-23 LAB
ANION GAP SERPL CALC-SCNC: 11 MMOL/L
BASOPHILS # BLD AUTO: 0 K/UL
BASOPHILS NFR BLD: 0 %
BUN SERPL-MCNC: 20 MG/DL
CALCIUM SERPL-MCNC: 9.9 MG/DL
CHLORIDE SERPL-SCNC: 107 MMOL/L
CO2 SERPL-SCNC: 19 MMOL/L
CREAT SERPL-MCNC: 1.8 MG/DL
DIFFERENTIAL METHOD: ABNORMAL
EOSINOPHIL # BLD AUTO: 0.1 K/UL
EOSINOPHIL NFR BLD: 1.7 %
ERYTHROCYTE [DISTWIDTH] IN BLOOD BY AUTOMATED COUNT: 14.6 %
EST. GFR  (AFRICAN AMERICAN): 39 ML/MIN/1.73 M^2
EST. GFR  (NON AFRICAN AMERICAN): 34 ML/MIN/1.73 M^2
GLUCOSE SERPL-MCNC: 116 MG/DL
HCT VFR BLD AUTO: 37.8 %
HGB BLD-MCNC: 12.8 G/DL
LYMPHOCYTES # BLD AUTO: 1 K/UL
LYMPHOCYTES NFR BLD: 12.2 %
MCH RBC QN AUTO: 30.8 PG
MCHC RBC AUTO-ENTMCNC: 33.9 G/DL
MCV RBC AUTO: 91 FL
MONOCYTES # BLD AUTO: 0.4 K/UL
MONOCYTES NFR BLD: 4.2 %
NEUTROPHILS # BLD AUTO: 6.9 K/UL
NEUTROPHILS NFR BLD: 81.9 %
PLATELET # BLD AUTO: 230 K/UL
PMV BLD AUTO: 10.5 FL
POTASSIUM SERPL-SCNC: 4.6 MMOL/L
RBC # BLD AUTO: 4.16 M/UL
SODIUM SERPL-SCNC: 137 MMOL/L
WBC # BLD AUTO: 8.36 K/UL

## 2017-09-23 PROCEDURE — 12000002 HC ACUTE/MED SURGE SEMI-PRIVATE ROOM

## 2017-09-23 PROCEDURE — 25000003 PHARM REV CODE 250: Performed by: EMERGENCY MEDICINE

## 2017-09-23 PROCEDURE — 63600175 PHARM REV CODE 636 W HCPCS: Performed by: EMERGENCY MEDICINE

## 2017-09-23 PROCEDURE — 96374 THER/PROPH/DIAG INJ IV PUSH: CPT

## 2017-09-23 PROCEDURE — 80048 BASIC METABOLIC PNL TOTAL CA: CPT

## 2017-09-23 PROCEDURE — 25500020 PHARM REV CODE 255: Performed by: EMERGENCY MEDICINE

## 2017-09-23 PROCEDURE — 99284 EMERGENCY DEPT VISIT MOD MDM: CPT | Mod: 25

## 2017-09-23 PROCEDURE — 96361 HYDRATE IV INFUSION ADD-ON: CPT

## 2017-09-23 PROCEDURE — G0378 HOSPITAL OBSERVATION PER HR: HCPCS

## 2017-09-23 PROCEDURE — 96372 THER/PROPH/DIAG INJ SC/IM: CPT

## 2017-09-23 PROCEDURE — 85025 COMPLETE CBC W/AUTO DIFF WBC: CPT

## 2017-09-23 RX ORDER — ONDANSETRON 2 MG/ML
4 INJECTION INTRAMUSCULAR; INTRAVENOUS
Status: COMPLETED | OUTPATIENT
Start: 2017-09-23 | End: 2017-09-23

## 2017-09-23 RX ORDER — MORPHINE SULFATE 10 MG/ML
2 INJECTION INTRAMUSCULAR; INTRAVENOUS; SUBCUTANEOUS EVERY 4 HOURS PRN
Status: DISCONTINUED | OUTPATIENT
Start: 2017-09-24 | End: 2017-09-25 | Stop reason: HOSPADM

## 2017-09-23 RX ORDER — DICYCLOMINE HYDROCHLORIDE 10 MG/ML
20 INJECTION INTRAMUSCULAR
Status: COMPLETED | OUTPATIENT
Start: 2017-09-23 | End: 2017-09-23

## 2017-09-23 RX ORDER — ACETAMINOPHEN 325 MG/1
650 TABLET ORAL EVERY 8 HOURS PRN
Status: DISCONTINUED | OUTPATIENT
Start: 2017-09-24 | End: 2017-09-25 | Stop reason: HOSPADM

## 2017-09-23 RX ORDER — PANTOPRAZOLE SODIUM 40 MG/10ML
40 INJECTION, POWDER, LYOPHILIZED, FOR SOLUTION INTRAVENOUS DAILY
Status: DISCONTINUED | OUTPATIENT
Start: 2017-09-24 | End: 2017-09-25 | Stop reason: HOSPADM

## 2017-09-23 RX ORDER — HEPARIN SODIUM 5000 [USP'U]/ML
5000 INJECTION, SOLUTION INTRAVENOUS; SUBCUTANEOUS EVERY 8 HOURS
Status: DISCONTINUED | OUTPATIENT
Start: 2017-09-24 | End: 2017-09-24 | Stop reason: SDUPTHER

## 2017-09-23 RX ORDER — SODIUM CHLORIDE 9 MG/ML
1000 INJECTION, SOLUTION INTRAVENOUS
Status: COMPLETED | OUTPATIENT
Start: 2017-09-23 | End: 2017-09-23

## 2017-09-23 RX ORDER — DEXTROSE MONOHYDRATE, SODIUM CHLORIDE, AND POTASSIUM CHLORIDE 50; 1.49; 4.5 G/1000ML; G/1000ML; G/1000ML
INJECTION, SOLUTION INTRAVENOUS CONTINUOUS
Status: DISCONTINUED | OUTPATIENT
Start: 2017-09-24 | End: 2017-09-25

## 2017-09-23 RX ORDER — ONDANSETRON 2 MG/ML
4 INJECTION INTRAMUSCULAR; INTRAVENOUS EVERY 6 HOURS PRN
Status: DISCONTINUED | OUTPATIENT
Start: 2017-09-24 | End: 2017-09-25 | Stop reason: HOSPADM

## 2017-09-23 RX ORDER — SODIUM CHLORIDE 0.9 % (FLUSH) 0.9 %
3 SYRINGE (ML) INJECTION EVERY 8 HOURS
Status: DISCONTINUED | OUTPATIENT
Start: 2017-09-24 | End: 2017-09-25 | Stop reason: HOSPADM

## 2017-09-23 RX ADMIN — IOHEXOL 75 ML: 350 INJECTION, SOLUTION INTRAVENOUS at 09:09

## 2017-09-23 RX ADMIN — IOHEXOL 30 ML: 300 INJECTION, SOLUTION INTRAVENOUS at 09:09

## 2017-09-23 RX ADMIN — DICYCLOMINE HYDROCHLORIDE 20 MG: 10 INJECTION INTRAMUSCULAR at 06:09

## 2017-09-23 RX ADMIN — SODIUM CHLORIDE 1000 ML: 0.9 INJECTION, SOLUTION INTRAVENOUS at 06:09

## 2017-09-23 RX ADMIN — ONDANSETRON 4 MG: 2 INJECTION INTRAMUSCULAR; INTRAVENOUS at 06:09

## 2017-09-23 NOTE — ED TRIAGE NOTES
Pt arrived to ED via EMS from home with c/o pain in the abdomen after taken 2 pills for gas. Pt is unsure what the medication was called. Pt thought he was taking his pain medication. Pt c/o nausea. Denies V/D/F. Last bowel movement was last Tuesday pt stated. Denies SOB and chest pain. REU. AAO x 4. Will continue to monitor.

## 2017-09-23 NOTE — ED PROVIDER NOTES
"Encounter Date: 9/23/2017    SCRIBE #1 NOTE: I, Mitchsofía Firoe, am scribing for, and in the presence of,  Gamal Jessica MD. I have scribed the following portions of the note - Other sections scribed: HPI, ROS, and PE.       History     Chief Complaint   Patient presents with    Post-op Problem     " I had my gallbladder taken out on Monday the 18th and I am having alot of pain in the area. I thought I was taking my pain medication this am, but I took something else instead on accident."      CC: Post-op Problem    HPI: Pt is a 83 y.o. M with PMHx coronary artery disease, GERD, HTN, and PSHx hernia repair and cholecystectomy who presents to ED c/o abdominal pain with associated swelling and nausea s/p cholecystectomy on 09/20/17. Pt reports that sx occurred this morning after eating grits, eggs, and a ham biscuit. Pt attempted tx with what he believed were his prescribed opiate-based pain pills but were unspecified pills instead. No further sx or alleviating/exacerbating factors. Denies emesis. Last BM was the day before yesterday.      The history is provided by the patient. No  was used.     Review of patient's allergies indicates:   Allergen Reactions    Penicillins Rash     Past Medical History:   Diagnosis Date    Anticoagulant long-term use     Cancer     Coronary artery disease     GERD (gastroesophageal reflux disease)     Hypertension     PAD (peripheral artery disease)     s/p stent     Past Surgical History:   Procedure Laterality Date    HERNIA REPAIR      stents       History reviewed. No pertinent family history.  Social History   Substance Use Topics    Smoking status: Former Smoker     Quit date: 12/26/1995    Smokeless tobacco: Former User      Comment: pt reports stopped smoking 30 years ago    Alcohol use No     Review of Systems   Constitutional: Negative for diaphoresis and fever.   HENT: Negative for sore throat.    Eyes: Negative for visual disturbance. "   Respiratory: Negative for cough and shortness of breath.    Cardiovascular: Negative for chest pain.   Gastrointestinal: Positive for abdominal pain (LLQ) and nausea. Negative for diarrhea and vomiting.   Genitourinary: Negative for dysuria.   Musculoskeletal: Negative for back pain.   Skin: Negative for rash.   Neurological: Negative for weakness and headaches.   Hematological: Does not bruise/bleed easily.   All other systems reviewed and are negative.      Physical Exam   PHYSICAL EXAM:  Vital signs and nursing assessment noted:    GEN:   NAD, A & Ox3, atraumatic, well appearing, nontoxic appearing, mild distress  HEENT:  PERRLA, EOMI, moist membranes, nl conjunctiva, no scleral icterus, no nystagmus, no nodes/nodules, soft, supple, FROM, no trachial deviation, nexus negative  CV:   RRR no m/r/g, 2+ radial pulses, <2sec cap refill  RESP:  CTA B, no w/r/r, equal and bilateral chest rise, no respiratory distress  ABD:   Soft, apple-sized contusion to LLQ with tenderness, distended, decreased BS, no guarding/rebound  BACK:  FROM, no midline tenderness, no paraspinal tenderness  :   Deferred  EXT:   FROM, AMES x 4, no edema, no calf tenderness, no swelling  LYMPH:  no gross adenopathy  NEURO:  CN II-XII grossly intact, no obvious motor/sensory deficit, negative Romberg, no tremor, nl gait/coordination  PSYCH:   no SI/HI, no anxiety, nl mood/affect, nl judgement/thought process  SKIN:  Warm, dry, intact, no rashes/lesions or masses, nl color, no pallor, clean and intact surgical scar consistent with cholecystectomy    Initial Vitals [09/23/17 1729]   BP Pulse Resp Temp SpO2   (!) 213/86 65 (!) 22 98.7 °F (37.1 °C) 100 %      MAP       128.33         Physical Exam    ED Course   Procedures  Labs Reviewed   BASIC METABOLIC PANEL - Abnormal; Notable for the following:        Result Value    CO2 19 (*)     Glucose 116 (*)     Creatinine 1.8 (*)     eGFR if  39 (*)     eGFR if non  34 (*)      All other components within normal limits   CBC W/ AUTO DIFFERENTIAL - Abnormal; Notable for the following:     RBC 4.16 (*)     Hemoglobin 12.8 (*)     Hematocrit 37.8 (*)     RDW 14.6 (*)     Gran% 81.9 (*)     Lymph% 12.2 (*)     All other components within normal limits             Medical Decision Making:   History:   Old Medical Records: I decided to obtain old medical records.  Initial Assessment:   This is an 83-year-old gentleman postop day 4 of a Barbara who presents with diffuse abdominal pain greater in the lower area than in the upper area.  Associated with constipation and mild nausea.  Differential Diagnosis:   Abdominal pain differential includes but not exclusive to: gallstones, cholecystitis, pancreatitis, hepatitis, PUD, obstruction, ileus kidney stone, pyelonephritis, diverticulitis, malingering  Clinical Tests:   Lab Tests: Ordered and Reviewed  The following lab test(s) were unremarkable: CBC       <> Summary of Lab: Elevated creatinine, low bicarb  Radiological Study: Ordered and Reviewed  ED Management:  Treatment plan includes physical exam, cardiac monitoring, labs, imaging studies, pain control, IVF and supportive care.    Patient improved after treatment but unable to tolerate by mouth sufficient .  Surgery consulted  Patient updated on care    Further plan per inpatient team    Other:   I have discussed this case with another health care provider.       <> Summary of the Discussion: Surgery consulted at 2000.  Dr. Jose Portillo says that she will come bedside to evaluate the patient and decide whether or not he needs to be admitted onto the surgery service or the hospitalist service.              Attending Attestation:           Physician Attestation for Scribe:  Physician Attestation Statement for Scribe #1: I, Gamal Jessica MD, reviewed documentation, as scribed by Mitch Fiore in my presence, and it is both accurate and complete.                 ED Course      Clinical  Impression:   The encounter diagnosis was Ileus.    Disposition:   Disposition: Admitted  Condition: Stable                        Gamal Jessica MD  09/25/17 0143

## 2017-09-24 LAB
ANION GAP SERPL CALC-SCNC: 9 MMOL/L
BASOPHILS # BLD AUTO: 0.01 K/UL
BASOPHILS NFR BLD: 0.2 %
BUN SERPL-MCNC: 16 MG/DL
CALCIUM SERPL-MCNC: 9.3 MG/DL
CHLORIDE SERPL-SCNC: 109 MMOL/L
CO2 SERPL-SCNC: 20 MMOL/L
CREAT SERPL-MCNC: 1.6 MG/DL
DIFFERENTIAL METHOD: ABNORMAL
EOSINOPHIL # BLD AUTO: 0.3 K/UL
EOSINOPHIL NFR BLD: 4.6 %
ERYTHROCYTE [DISTWIDTH] IN BLOOD BY AUTOMATED COUNT: 14.7 %
EST. GFR  (AFRICAN AMERICAN): 45 ML/MIN/1.73 M^2
EST. GFR  (NON AFRICAN AMERICAN): 39 ML/MIN/1.73 M^2
GLUCOSE SERPL-MCNC: 104 MG/DL
HCT VFR BLD AUTO: 32.4 %
HGB BLD-MCNC: 10.7 G/DL
LYMPHOCYTES # BLD AUTO: 2.4 K/UL
LYMPHOCYTES NFR BLD: 37.5 %
MAGNESIUM SERPL-MCNC: 1.7 MG/DL
MCH RBC QN AUTO: 30 PG
MCHC RBC AUTO-ENTMCNC: 33 G/DL
MCV RBC AUTO: 91 FL
MONOCYTES # BLD AUTO: 0.7 K/UL
MONOCYTES NFR BLD: 11.4 %
NEUTROPHILS # BLD AUTO: 2.9 K/UL
NEUTROPHILS NFR BLD: 46.3 %
PHOSPHATE SERPL-MCNC: 2.5 MG/DL
PLATELET # BLD AUTO: 229 K/UL
PMV BLD AUTO: 11 FL
POTASSIUM SERPL-SCNC: 4.6 MMOL/L
RBC # BLD AUTO: 3.57 M/UL
SODIUM SERPL-SCNC: 138 MMOL/L
WBC # BLD AUTO: 6.29 K/UL

## 2017-09-24 PROCEDURE — 63600175 PHARM REV CODE 636 W HCPCS: Performed by: EMERGENCY MEDICINE

## 2017-09-24 PROCEDURE — 83735 ASSAY OF MAGNESIUM: CPT

## 2017-09-24 PROCEDURE — 36415 COLL VENOUS BLD VENIPUNCTURE: CPT

## 2017-09-24 PROCEDURE — 63600175 PHARM REV CODE 636 W HCPCS: Performed by: SURGERY

## 2017-09-24 PROCEDURE — 12000002 HC ACUTE/MED SURGE SEMI-PRIVATE ROOM

## 2017-09-24 PROCEDURE — C9113 INJ PANTOPRAZOLE SODIUM, VIA: HCPCS | Performed by: SURGERY

## 2017-09-24 PROCEDURE — 84100 ASSAY OF PHOSPHORUS: CPT

## 2017-09-24 PROCEDURE — A4216 STERILE WATER/SALINE, 10 ML: HCPCS | Performed by: SURGERY

## 2017-09-24 PROCEDURE — 25000003 PHARM REV CODE 250: Performed by: EMERGENCY MEDICINE

## 2017-09-24 PROCEDURE — 85025 COMPLETE CBC W/AUTO DIFF WBC: CPT

## 2017-09-24 PROCEDURE — 25000003 PHARM REV CODE 250: Performed by: SURGERY

## 2017-09-24 PROCEDURE — 80048 BASIC METABOLIC PNL TOTAL CA: CPT

## 2017-09-24 RX ORDER — HEPARIN SODIUM 5000 [USP'U]/ML
5000 INJECTION, SOLUTION INTRAVENOUS; SUBCUTANEOUS EVERY 8 HOURS
Status: DISCONTINUED | OUTPATIENT
Start: 2017-09-24 | End: 2017-09-25 | Stop reason: HOSPADM

## 2017-09-24 RX ORDER — CARVEDILOL 6.25 MG/1
12.5 TABLET ORAL 2 TIMES DAILY
Status: DISCONTINUED | OUTPATIENT
Start: 2017-09-24 | End: 2017-09-25 | Stop reason: HOSPADM

## 2017-09-24 RX ORDER — LISINOPRIL 20 MG/1
40 TABLET ORAL DAILY
Status: DISCONTINUED | OUTPATIENT
Start: 2017-09-24 | End: 2017-09-25 | Stop reason: HOSPADM

## 2017-09-24 RX ORDER — SODIUM,POTASSIUM PHOSPHATES 280-250MG
1 POWDER IN PACKET (EA) ORAL ONCE
Status: COMPLETED | OUTPATIENT
Start: 2017-09-24 | End: 2017-09-24

## 2017-09-24 RX ORDER — MAGNESIUM SULFATE 1 G/100ML
1 INJECTION INTRAVENOUS ONCE
Status: COMPLETED | OUTPATIENT
Start: 2017-09-24 | End: 2017-09-24

## 2017-09-24 RX ORDER — ONDANSETRON 2 MG/ML
4 INJECTION INTRAMUSCULAR; INTRAVENOUS EVERY 8 HOURS PRN
Status: DISCONTINUED | OUTPATIENT
Start: 2017-09-24 | End: 2017-09-24 | Stop reason: SDUPTHER

## 2017-09-24 RX ORDER — AMLODIPINE BESYLATE 5 MG/1
10 TABLET ORAL DAILY
Status: DISCONTINUED | OUTPATIENT
Start: 2017-09-24 | End: 2017-09-25 | Stop reason: HOSPADM

## 2017-09-24 RX ADMIN — HEPARIN SODIUM 5000 UNITS: 5000 INJECTION, SOLUTION INTRAVENOUS; SUBCUTANEOUS at 03:09

## 2017-09-24 RX ADMIN — PANTOPRAZOLE SODIUM 40 MG: 40 INJECTION, POWDER, FOR SOLUTION INTRAVENOUS at 09:09

## 2017-09-24 RX ADMIN — Medication 3 ML: at 06:09

## 2017-09-24 RX ADMIN — CARVEDILOL 12.5 MG: 6.25 TABLET, FILM COATED ORAL at 10:09

## 2017-09-24 RX ADMIN — Medication 3 ML: at 10:09

## 2017-09-24 RX ADMIN — AMLODIPINE BESYLATE 10 MG: 5 TABLET ORAL at 09:09

## 2017-09-24 RX ADMIN — DEXTROSE MONOHYDRATE, SODIUM CHLORIDE, AND POTASSIUM CHLORIDE: 50; 4.5; 1.49 INJECTION, SOLUTION INTRAVENOUS at 12:09

## 2017-09-24 RX ADMIN — DEXTROSE MONOHYDRATE, SODIUM CHLORIDE, AND POTASSIUM CHLORIDE: 50; 4.5; 1.49 INJECTION, SOLUTION INTRAVENOUS at 05:09

## 2017-09-24 RX ADMIN — DEXTROSE MONOHYDRATE, SODIUM CHLORIDE, AND POTASSIUM CHLORIDE: 50; 4.5; 1.49 INJECTION, SOLUTION INTRAVENOUS at 09:09

## 2017-09-24 RX ADMIN — MAGNESIUM SULFATE 1 G: 1 INJECTION INTRAVENOUS at 03:09

## 2017-09-24 RX ADMIN — CARVEDILOL 12.5 MG: 6.25 TABLET, FILM COATED ORAL at 09:09

## 2017-09-24 RX ADMIN — POTASSIUM & SODIUM PHOSPHATES POWDER PACK 280-160-250 MG 1 PACKET: 280-160-250 PACK at 02:09

## 2017-09-24 RX ADMIN — LISINOPRIL 40 MG: 20 TABLET ORAL at 09:09

## 2017-09-24 RX ADMIN — HEPARIN SODIUM 5000 UNITS: 5000 INJECTION, SOLUTION INTRAVENOUS; SUBCUTANEOUS at 10:09

## 2017-09-24 RX ADMIN — HEPARIN SODIUM 5000 UNITS: 5000 INJECTION, SOLUTION INTRAVENOUS; SUBCUTANEOUS at 06:09

## 2017-09-24 NOTE — PLAN OF CARE
09/24/17 1547   Discharge Assessment   Assessment information obtained from? Patient   Communicated expected length of stay with patient/caregiver yes   Prior to hospitilization cognitive status: Alert/Oriented   Prior to hospitalization functional status: Independent   Current cognitive status: Alert/Oriented   Current Functional Status: Independent   Lives With alone   Able to Return to Prior Arrangements yes   Is patient able to care for self after discharge? Unable to determine at this time (comments)   Patient's perception of discharge disposition admitted as an inpatient;home or selfcare   Readmission Within The Last 30 Days no previous admission in last 30 days   Patient currently being followed by outpatient case management? No   Patient currently receives any other outside agency services? No   Equipment Currently Used at Home none   Do you have any problems affording any of your prescribed medications? No   Is the patient taking medications as prescribed? yes   Does the patient have transportation home? Yes   Transportation Available family or friend will provide   Does the patient receive services at the [facility]? No   Discharge Plan A Home with family   Patient/Family In Agreement With Plan yes   [place] patient; Emergency  -Johanna Osei - dtr  or [phone]. Provided patient with information on Meals on Wheels.    Marielle provides transportation. Says he had nurse to come to home but did not know name or if it is HH.

## 2017-09-24 NOTE — H&P
"[facility]  General Surgery  History & Physical    Patient Name: Mandeep Guy  MRN: [id number]  Admission Date: 9/23/2017  Attending Physician: Gamal Jessica MD   Primary Care Provider: Shona Dunbar MD    Patient information was obtained from patient and ER records.     Subjective:     Chief Complaint/Reason for Admission: abdominal pain and nausea    History of Present Illness:  Patient is a 83 y.o. male presents s/p lap kaushal on 9/20 presents with increasing pain and nausea. He states he did fine initially after surgery and had normal BM but today after breakfreast had increasing abdominal pain and nausea with retching but no emesis. Denies fever/chills/night sweats.  Presented to ED where a CT scan was done showing enlarged stomach and concern for ileus vs SBO. Pt has a known right inguinal hernia that he complains is also causing him pain. He has had pain at this hernia site for some time and sometimes feels a "knot"    No current facility-administered medications on file prior to encounter.      Current Outpatient Prescriptions on File Prior to Encounter   Medication Sig    amlodipine (NORVASC) 10 MG tablet Take 10 mg by mouth once daily. Take am of surgery 9-20-17.    aspirin 81 MG Chew Take 81 mg by mouth once daily.    carvedilol (COREG) 12.5 MG tablet Take 1 tablet (12.5 mg total) by mouth 2 (two) times daily.    clopidogrel (PLAVIX) 75 mg tablet Take 75 mg by mouth once daily.    ergocalciferol (ERGOCALCIFEROL) 50,000 unit Cap Take 50,000 Units by mouth every 7 days.    hydrochlorothiazide (HYDRODIURIL) 25 MG tablet Take 1 tablet (25 mg total) by mouth once daily.    hydrocodone-acetaminophen 5-325mg (NORCO) 5-325 mg per tablet Take 1-2 tablets by mouth every 4 (four) hours as needed for Pain.    lisinopril 10 MG tablet Take 40 mg by mouth once daily.     pantoprazole (PROTONIX) 40 MG tablet Take 40 mg by mouth once daily.    polyethylene glycol (GLYCOLAX) 17 gram/dose " powder Take 17 g by mouth daily as needed (constipation).    pravastatin (PRAVACHOL) 40 MG tablet Take 40 mg by mouth once daily.    [DISCONTINUED] nitroGLYCERIN (NITROSTAT) 0.4 MG SL tablet Place 1 tablet (0.4 mg total) under the tongue every 5 (five) minutes as needed for Chest pain (if more than three doses needed, seek urgent medical attention).       Review of patient's allergies indicates:   Allergen Reactions    Penicillins Rash       Past Medical History:   Diagnosis Date    Anticoagulant long-term use     Cancer     Coronary artery disease     GERD (gastroesophageal reflux disease)     Hypertension     PAD (peripheral artery disease)     s/p stent     Past Surgical History:   Procedure Laterality Date    HERNIA REPAIR      stents       Family History     None        Social History Main Topics    Smoking status: Former Smoker     Quit date: 12/26/1995    Smokeless tobacco: Former User      Comment: pt reports stopped smoking 30 years ago    Alcohol use No    Drug use: No    Sexual activity: No     Review of Systems   Comprehensive ROS was completed and negative other than that stated in HPI    Objective:     Vital Signs (Most Recent):  Temp: 98.9 °F (37.2 °C) (09/23/17 2150)  Pulse: 78 (09/23/17 2150)  Resp: 18 (09/23/17 2150)  BP: (!) 158/61 (09/23/17 2250)  SpO2: 96 % (09/23/17 2043) Vital Signs (24h Range):  Temp:  [98.7 °F (37.1 °C)-98.9 °F (37.2 °C)] 98.9 °F (37.2 °C)  Pulse:  [64-78] 78  Resp:  [18-22] 18  SpO2:  [95 %-100 %] 96 %  BP: (146-213)/(61-86) 158/61     Weight: 77.1 kg (170 lb)  Body mass index is 26.63 kg/m².    Physical Exam   Constitutional: He is oriented to person, place, and time. He appears well-developed and well-nourished.   HENT:   Head: Normocephalic and atraumatic.   Eyes: Conjunctivae and EOM are normal. No scleral icterus.   Neck: Normal range of motion. Neck supple.   Cardiovascular: Normal rate and regular rhythm.    Pulmonary/Chest: Effort normal and breath  sounds normal.   Abdominal: Soft. Bowel sounds are normal. He exhibits distension. There is tenderness (along umbilicus with some overlying ecchymosis which extends out lateraly). There is no rebound. A hernia (Right inguinal hernia present moderate size, soft but tender) is present.   Musculoskeletal: Normal range of motion.   Neurological: He is alert and oriented to person, place, and time.   Skin: Skin is warm and dry.       Significant Labs:  CBC:   Recent Labs  Lab 09/23/17  1825   WBC 8.36   RBC 4.16*   HGB 12.8*   HCT 37.8*      MCV 91   MCH 30.8   MCHC 33.9     CMP:   Recent Labs  Lab 09/23/17  1825   *   CALCIUM 9.9      K 4.6   CO2 19*      BUN 20   CREATININE 1.8*       Significant Diagnostics:  CT: I have reviewed all pertinent results/findings within the past 24 hours and my personal findings are:         Status post cholecystectomy with associated nonspecific pneumobilia.  No evidence of fluid collection in the gallbladder fossa.  Scattered foci of free air the upper abdomen, most likely postoperative in nature.    Small amount of free fluid in the pelvis.  This may be postoperative in nature.  Component no biliary leak is thought less likely.    Air-fluid levels within the small bowel loops with a right-sided inguinal hernia.  No definitive transition point.  The findings are thought to represent components of postoperative ileus.  Short-term followup suggested.    Indeterminate 1.2 cm low-attenuation lesion in the right kidney.  Ultrasound followup may be obtained.    Abdominal emphysema.    Additional findings as above.    Assessment/Plan:   84 y/o male s/p lap kaushal on 9/20 for choledocholithiasis presents with likely post operative ileus vs SBO  Will treat conservatively at this time  NPO  IVF  NGT if vomits  Check labs and KUB in AM    Active Diagnoses:    Diagnosis Date Noted POA    Ileus [K56.7] 09/23/2017 Yes      Problems Resolved During this Admission:     Diagnosis Date Noted Date Resolved POA     VTE Risk Mitigation         Ordered     heparin (porcine) injection 5,000 Units  Every 8 hours     Route:  Subcutaneous        09/23/17 2339     Medium Risk of VTE  Once      09/23/17 2339     Place sequential compression device  Until discontinued      09/23/17 2339          Magaly Rae MD  General Surgery  [facility]

## 2017-09-24 NOTE — PLAN OF CARE
Problem: Patient Care Overview  Goal: Plan of Care Review  Pt.monitored freq.throughout the shift. Pt.resting at present with no complaints & no acute distress noted. Iv fluids continue in use. Continues to pass flatus-no bm yet. Iv fluids continue in use. SCD's at bedside-pt.sitting up at present. Call light in reach.  Literature on ileus/SBO given to pt. per pt.request.

## 2017-09-25 VITALS
HEIGHT: 67 IN | WEIGHT: 175.25 LBS | RESPIRATION RATE: 18 BRPM | TEMPERATURE: 99 F | OXYGEN SATURATION: 97 % | DIASTOLIC BLOOD PRESSURE: 74 MMHG | SYSTOLIC BLOOD PRESSURE: 169 MMHG | BODY MASS INDEX: 27.51 KG/M2 | HEART RATE: 57 BPM

## 2017-09-25 PROBLEM — K56.7 ILEUS: Status: RESOLVED | Noted: 2017-09-23 | Resolved: 2017-09-25

## 2017-09-25 PROBLEM — K80.50 CHOLEDOCHOLITHIASIS: Status: RESOLVED | Noted: 2017-08-28 | Resolved: 2017-09-25

## 2017-09-25 PROCEDURE — 63600175 PHARM REV CODE 636 W HCPCS: Performed by: EMERGENCY MEDICINE

## 2017-09-25 PROCEDURE — 25000003 PHARM REV CODE 250: Performed by: EMERGENCY MEDICINE

## 2017-09-25 PROCEDURE — 25000003 PHARM REV CODE 250: Performed by: SURGERY

## 2017-09-25 PROCEDURE — A4216 STERILE WATER/SALINE, 10 ML: HCPCS | Performed by: SURGERY

## 2017-09-25 RX ADMIN — AMLODIPINE BESYLATE 10 MG: 5 TABLET ORAL at 08:09

## 2017-09-25 RX ADMIN — LISINOPRIL 40 MG: 20 TABLET ORAL at 08:09

## 2017-09-25 RX ADMIN — Medication 3 ML: at 02:09

## 2017-09-25 RX ADMIN — DEXTROSE MONOHYDRATE, SODIUM CHLORIDE, AND POTASSIUM CHLORIDE: 50; 4.5; 1.49 INJECTION, SOLUTION INTRAVENOUS at 01:09

## 2017-09-25 RX ADMIN — CARVEDILOL 12.5 MG: 6.25 TABLET, FILM COATED ORAL at 08:09

## 2017-09-25 RX ADMIN — HEPARIN SODIUM 5000 UNITS: 5000 INJECTION, SOLUTION INTRAVENOUS; SUBCUTANEOUS at 06:09

## 2017-09-25 RX ADMIN — HEPARIN SODIUM 5000 UNITS: 5000 INJECTION, SOLUTION INTRAVENOUS; SUBCUTANEOUS at 04:09

## 2017-09-25 NOTE — PLAN OF CARE
09/25/17 1230   Discharge Reassessment   Assessment Type Discharge Planning Reassessment   Provided patient/caregiver education on the expected discharge date and the discharge plan No   Do you have any problems affording any of your prescribed medications? No   Discharge Plan A Home with family   Discharge Plan B Home with family   Patient choice form signed by patient/caregiver No   Can the patient answer the patient profile reliably? Yes, cognitively intact   How does the patient rate their overall health at the present time? Fair   Describe the patient's ability to walk at the present time. No restrictions   How often would a person be available to care for the patient? Infrequently   Number of comorbid conditions (as recorded on the chart) Three   During the past month, has the patient often been bothered by feeling down, depressed or hopeless? No   During the past month, has the patient often been bothered by little interest or pleasure in doing things? No

## 2017-09-25 NOTE — NURSING
Attempted to call patient's granddaughter Sanjuana at [phone], left message on answering service regarding patient's discharge. Awaiting call back at this time. Will continue to monitor patient.

## 2017-09-25 NOTE — PLAN OF CARE
Problem: Patient Care Overview  Goal: Plan of Care Review  Outcome: Ongoing (interventions implemented as appropriate)  Pt AAOx4 ,denies pain, VS stable, Lap sites x 4 healing well, NPO status maintained, Pt has no complaint of nausea tonight,, Safety Maintained, Free of falls, SR up x2, Call Covarrubias in reach. Bed in low position, No issues during shift. Continue plan of care.

## 2017-09-25 NOTE — PROGRESS NOTES
[facility]  General Surgery  Progress Note    Subjective:     Interval History: o/n no acute events. States he did well with clears feels much better. No N/V. Pain gone.    Post-Op Info:  * No surgery found *          Medications:  Continuous Infusions:   Scheduled Meds:   amlodipine  10 mg Oral Daily    carvedilol  12.5 mg Oral BID    heparin (porcine)  5,000 Units Subcutaneous Q8H    lisinopril  40 mg Oral Daily    pantoprazole  40 mg Intravenous Daily    sodium chloride 0.9%  3 mL Intravenous Q8H     PRN Meds:acetaminophen, morphine, ondansetron, promethazine (PHENERGAN) IVPB     Objective:     Vital Signs (Most Recent):  Temp: 99.3 °F (37.4 °C) (09/25/17 1649)  Pulse: (!) 57 (09/25/17 1649)  Resp: 18 (09/25/17 1649)  BP: (!) 169/74 (09/25/17 1649)  SpO2: 97 % (09/25/17 1649) Vital Signs (24h Range):  Temp:  [97.9 °F (36.6 °C)-99.3 °F (37.4 °C)] 99.3 °F (37.4 °C)  Pulse:  [49-60] 57  Resp:  [18] 18  SpO2:  [95 %-100 %] 97 %  BP: (136-199)/(63-77) 169/74       Intake/Output Summary (Last 24 hours) at 09/25/17 1754  Last data filed at 09/25/17 0900   Gross per 24 hour   Intake              483 ml   Output              600 ml   Net             -117 ml       Physical Exam  NAD, AAO  CTABL  RRR  Soft, Nt, Nd, incisions x4 c/d/i with some ecchymosis at umbilical port site  No edema    Significant Labs:  CBC:   Recent Labs  Lab 09/24/17  0441   WBC 6.29   RBC 3.57*   HGB 10.7*   HCT 32.4*      MCV 91   MCH 30.0   MCHC 33.0     CMP:   Recent Labs  Lab 09/24/17  0441      CALCIUM 9.3      K 4.6   CO2 20*      BUN 16   CREATININE 1.6*       Significant Diagnostics:  None    Assessment/Plan:   82 y/o male s/p lap kaushal on 9/20 for choledocholithiasis presents with likely post operative ileus vs SBO  Tolerated clears. Advance to regular diet.   D/c IVF  If does well will plan to discharge this evening  ALEC slightly better Cr from 1.8 to 1.6      Active Diagnoses:     Diagnosis Date Noted POA    Ileus [K56.7] 09/23/2017 Yes      Problems Resolved During this Admission:    Diagnosis Date Noted Date Resolved POA         Magaly Rae MD  General Surgery  [facility]

## 2017-09-25 NOTE — NURSING
Discharge instructions and follow up appointments given and explained to patient. IV removed patient tolerated well. No acute distress noted at this time. Patient awaiting transportation at this time. Will continue to monitor patient.

## 2017-09-25 NOTE — DISCHARGE SUMMARY
[facility]  General Surgery  Discharge Summary      Patient Name: Mandeep Guy  MRN: [id number]  Admission Date: 9/23/2017  Hospital Length of Stay: 2 days  Discharge Date and Time:  09/25/2017 6:12 PM  Attending Physician: Cecil Ha MD   Discharging Provider: Magaly Rae MD  Primary Care Provider: Shona Dunbar MD     HPI: 82 y/o male s/p uneventful lap kaushal on 9/20 presents with increased abdominal pain and nausea. He had a CT scan done in the ER which showed signs of SBO vs ileus.    * No surgery found *     Hospital Course: He was made NPO and given IVF. Serial KUBs were followed. He felt improvement without NGT placement. Eventually he did start passing gas and having BM so he was slowly advanced to clears and then regular diet. Once he tolerated regular diet without nausea or vomiting he was deemed appropriate for discharge. His Cr was slightly elevated on admission but decreased by the time he was discharged. It was likely secondary to dehydration    Consults: none     Significant Diagnostic Studies: Labs:   CMP   Recent Labs  Lab 09/23/17 1825 09/24/17  0441    138   K 4.6 4.6    109   CO2 19* 20*   * 104   BUN 20 16   CREATININE 1.8* 1.6*   CALCIUM 9.9 9.3   ANIONGAP 11 9   ESTGFRAFRICA 39* 45*   EGFRNONAA 34* 39*    and CBC   Recent Labs  Lab 09/23/17 1825 09/24/17  0441   WBC 8.36 6.29   HGB 12.8* 10.7*   HCT 37.8* 32.4*    229       Pending Diagnostic Studies:     None        Final Active Diagnoses:    Diagnosis Date Noted POA      Problems Resolved During this Admission:    Diagnosis Date Noted Date Resolved POA    PRINCIPAL PROBLEM:  Ileus [K56.7] 09/23/2017 09/25/2017 Yes      Discharged Condition: good    Disposition: Home or Self Care    Follow Up:  Follow-up Information     Ted Ashby MD In 2 weeks.    Specialties:  General Surgery, Bariatrics  Contact information:  [address] [place] [address]  [address]  [facility]  Samantha BEGUM  [id number]  [phone]                 Patient Instructions:     Diet general     Lifting restrictions     Call MD for:  persistent nausea and vomiting or diarrhea     Call MD for:  redness, tenderness, or signs of infection (pain, swelling, redness, odor or green/yellow discharge around incision site)       Medications:  Reconciled Home Medications:   Current Discharge Medication List      CONTINUE these medications which have NOT CHANGED    Details   amlodipine (NORVASC) 10 MG tablet Take 10 mg by mouth once daily. Take am of surgery 9-20-17.      aspirin 81 MG Chew Take 81 mg by mouth once daily.      carvedilol (COREG) 12.5 MG tablet Take 1 tablet (12.5 mg total) by mouth 2 (two) times daily.  Qty: 60 tablet, Refills: 11      clopidogrel (PLAVIX) 75 mg tablet Take 75 mg by mouth once daily.      ergocalciferol (ERGOCALCIFEROL) 50,000 unit Cap Take 50,000 Units by mouth every 7 days.      hydrochlorothiazide (HYDRODIURIL) 25 MG tablet Take 1 tablet (25 mg total) by mouth once daily.  Qty: 30 tablet, Refills: 11      hydrocodone-acetaminophen 5-325mg (NORCO) 5-325 mg per tablet Take 1-2 tablets by mouth every 4 (four) hours as needed for Pain.  Qty: 40 tablet, Refills: 0      lisinopril 10 MG tablet Take 40 mg by mouth once daily.       pantoprazole (PROTONIX) 40 MG tablet Take 40 mg by mouth once daily.      polyethylene glycol (GLYCOLAX) 17 gram/dose powder Take 17 g by mouth daily as needed (constipation).  Qty: 1 Bottle, Refills: 0      pravastatin (PRAVACHOL) 40 MG tablet Take 40 mg by mouth once daily.             Magaly Rae MD  General Surgery  [facility]

## 2017-09-25 NOTE — PLAN OF CARE
Problem: Fall Risk (Adult)  Intervention: Review Medications/Identify Contributors to Fall Risk   09/25/17 1507   Safety Interventions   Medication Review/Management medications reviewed     Intervention: Patient Rounds   09/25/17 1400   Safety Interventions   Patient Rounds bed in low position;bed wheels locked;call light in reach;clutter free environment maintained;ID band on;visualized patient;placement of personal items at bedside;toileting offered

## 2017-09-26 NOTE — NURSING
Patient refused wheel chair and refused transport, ambulated to car independently, daughter by side. No acute distress noted.

## 2017-09-27 ENCOUNTER — PATIENT OUTREACH (OUTPATIENT)
Dept: ADMINISTRATIVE | Facility: CLINIC | Age: 82
End: 2017-09-27

## 2017-10-09 ENCOUNTER — HOSPITAL ENCOUNTER (OUTPATIENT)
Dept: PREADMISSION TESTING | Facility: HOSPITAL | Age: 82
Discharge: HOME | End: 2017-10-09
Attending: SURGERY
Payer: MEDICARE

## 2017-10-09 VITALS
HEIGHT: 68 IN | OXYGEN SATURATION: 98 % | RESPIRATION RATE: 18 BRPM | WEIGHT: 174 LBS | HEART RATE: 61 BPM | BODY MASS INDEX: 26.37 KG/M2 | TEMPERATURE: 98 F | DIASTOLIC BLOOD PRESSURE: 67 MMHG | SYSTOLIC BLOOD PRESSURE: 149 MMHG

## 2017-10-09 NOTE — DISCHARGE INSTRUCTIONS
Your surgery is scheduled for_____10/16/2017____________.    Call [phone] between 2 pm and 5 pm ___10/13/2017_________ to find out your arrival time for the day of surgery.    Report to SAME DAY SURGERY UNIT at _______am on the 2nd floor of the hospital.  Use the front entrance of the hospital before 6 am.  If you need wheelchair assistance, call [phone] from your cell phone,  or call 0 from the courtesy phone in the hospital lobby.    Important instructions:   Do not eat or drink after 12 midnight, including water.  It is okay to brush your teeth.  Do not have gum, candy or mints.     Take only these medications with a small swallow of water on the morning of your surgery.____amlodipine, carvedilol_________         Please shower the night before and the morning of your surgery.       Use Hibiclens soap to your surgery site if instructed by your pre op nurse.   If your surgery is on your abdomen, be sure to wash your naval.  Be sure to rinse off Hibiclens after it is on your skin for several minutes.  Do not use Hibiclens on your face or genitals.      You may wear deodorant only.      Do not wear powder, body lotion or cologne.     Do not wear any jewelry or have any metal on your body.     Please bring any documents given to you by your doctor.     If you are going home on the same day of surgery, you must have arrangements for a ride home.  You will not be able to drive home if you were given anesthesia or sedation.     Stop taking Plavix, Aspirin, Ibuprofen, Motrin and Aleve at least 5 days before your surgery. You may use Tylenol.     Stop taking fish oil and vitamin E for least 5 days before surgery.     Wear loose fitting clothes allowing for bandages.     Please leave money and valuables home.       You may bring your cell phone.     Call the doctor if fever or illness should occur before your surgery.    Call [phone] to contact us here at [facility] if needed.

## 2017-10-16 ENCOUNTER — ANESTHESIA (OUTPATIENT)
Dept: SURGERY | Facility: HOSPITAL | Age: 82
End: 2017-10-16
Payer: MEDICARE

## 2017-10-16 ENCOUNTER — ANESTHESIA EVENT (OUTPATIENT)
Dept: SURGERY | Facility: HOSPITAL | Age: 82
End: 2017-10-16
Payer: MEDICARE

## 2017-10-16 ENCOUNTER — HOSPITAL ENCOUNTER (OUTPATIENT)
Facility: HOSPITAL | Age: 82
Discharge: HOME | End: 2017-10-17
Attending: SURGERY | Admitting: SURGERY
Payer: MEDICARE

## 2017-10-16 DIAGNOSIS — R00.1 BRADYCARDIA: ICD-10-CM

## 2017-10-16 DIAGNOSIS — I97.89 BRADYCARDIA FOLLOWING SURGERY: ICD-10-CM

## 2017-10-16 DIAGNOSIS — I73.9 PAD (PERIPHERAL ARTERY DISEASE): Chronic | ICD-10-CM

## 2017-10-16 DIAGNOSIS — I10 ESSENTIAL HYPERTENSION: Chronic | ICD-10-CM

## 2017-10-16 DIAGNOSIS — I25.10 CORONARY ARTERY DISEASE, ANGINA PRESENCE UNSPECIFIED, UNSPECIFIED VESSEL OR LESION TYPE, UNSPECIFIED WHETHER NATIVE OR TRANSPLANTED HEART: Chronic | ICD-10-CM

## 2017-10-16 DIAGNOSIS — N18.30 CHRONIC RENAL IMPAIRMENT, STAGE 3 (MODERATE): ICD-10-CM

## 2017-10-16 DIAGNOSIS — K40.90 NON-RECURRENT INGUINAL HERNIA WITHOUT OBSTRUCTION OR GANGRENE, UNSPECIFIED LATERALITY: Primary | ICD-10-CM

## 2017-10-16 DIAGNOSIS — K40.20 NON-RECURRENT BILATERAL INGUINAL HERNIA WITHOUT OBSTRUCTION OR GANGRENE: ICD-10-CM

## 2017-10-16 DIAGNOSIS — K56.7 ILEUS: ICD-10-CM

## 2017-10-16 LAB
ALBUMIN SERPL BCP-MCNC: 3.1 G/DL
ALLENS TEST: ABNORMAL
ALP SERPL-CCNC: 102 U/L
ALT SERPL W/O P-5'-P-CCNC: 15 U/L
ANION GAP SERPL CALC-SCNC: 6 MMOL/L
AST SERPL-CCNC: 15 U/L
BASOPHILS # BLD AUTO: 0.01 K/UL
BASOPHILS NFR BLD: 0.1 %
BILIRUB SERPL-MCNC: 0.4 MG/DL
BUN SERPL-MCNC: 21 MG/DL
CALCIUM SERPL-MCNC: 9.2 MG/DL
CHLORIDE SERPL-SCNC: 114 MMOL/L
CK SERPL-CCNC: 31 U/L
CO2 SERPL-SCNC: 18 MMOL/L
CREAT SERPL-MCNC: 1.6 MG/DL
DELSYS: ABNORMAL
DELSYS: ABNORMAL
DIFFERENTIAL METHOD: ABNORMAL
EOSINOPHIL # BLD AUTO: 0.2 K/UL
EOSINOPHIL NFR BLD: 1.4 %
ERYTHROCYTE [DISTWIDTH] IN BLOOD BY AUTOMATED COUNT: 15.1 %
ERYTHROCYTE [SEDIMENTATION RATE] IN BLOOD BY WESTERGREN METHOD: 12 MM/H
ERYTHROCYTE [SEDIMENTATION RATE] IN BLOOD BY WESTERGREN METHOD: 28 MM/H
EST. GFR  (AFRICAN AMERICAN): 45 ML/MIN/1.73 M^2
EST. GFR  (NON AFRICAN AMERICAN): 39 ML/MIN/1.73 M^2
FIO2: 65
FIO2: 65
GLUCOSE SERPL-MCNC: 152 MG/DL
HCO3 UR-SCNC: 21.3 MMOL/L (ref 24–28)
HCO3 UR-SCNC: 22.4 MMOL/L (ref 24–28)
HCO3 UR-SCNC: 23 MMOL/L (ref 24–28)
HCT VFR BLD AUTO: 35.3 %
HCT VFR BLD CALC: 31 %PCV (ref 36–54)
HCT VFR BLD CALC: 35 %PCV (ref 36–54)
HGB BLD-MCNC: 11.5 G/DL
LACTATE SERPL-SCNC: 1.2 MMOL/L
LYMPHOCYTES # BLD AUTO: 2.6 K/UL
LYMPHOCYTES NFR BLD: 22.1 %
MCH RBC QN AUTO: 30.3 PG
MCHC RBC AUTO-ENTMCNC: 32.6 G/DL
MCV RBC AUTO: 93 FL
MODE: ABNORMAL
MODE: ABNORMAL
MONOCYTES # BLD AUTO: 0.5 K/UL
MONOCYTES NFR BLD: 4.2 %
NEUTROPHILS # BLD AUTO: 8.6 K/UL
NEUTROPHILS NFR BLD: 72.2 %
PCO2 BLDA: 41.9 MMHG (ref 35–45)
PCO2 BLDA: 57.7 MMHG (ref 35–45)
PCO2 BLDA: 75.4 MMHG (ref 35–45)
PEEP: 5
PEEP: 5
PH SMN: 7.09 [PH] (ref 7.35–7.45)
PH SMN: 7.17 [PH] (ref 7.35–7.45)
PH SMN: 7.33 [PH] (ref 7.35–7.45)
PLATELET # BLD AUTO: 253 K/UL
PMV BLD AUTO: 9.8 FL
PO2 BLDA: 159 MMHG (ref 80–100)
PO2 BLDA: 204 MMHG (ref 80–100)
PO2 BLDA: 268 MMHG (ref 80–100)
POC BE: -3 MMOL/L
POC BE: -7 MMOL/L
POC BE: -8 MMOL/L
POC IONIZED CALCIUM: 1.32 MMOL/L (ref 1.06–1.42)
POC IONIZED CALCIUM: 1.39 MMOL/L (ref 1.06–1.42)
POC SATURATED O2: 100 % (ref 95–100)
POC SATURATED O2: 98 % (ref 95–100)
POC SATURATED O2: 99 % (ref 95–100)
POC TCO2: 23 MMOL/L (ref 23–27)
POC TCO2: 24 MMOL/L (ref 23–27)
POC TCO2: 25 MMOL/L (ref 23–27)
POCT GLUCOSE: 187 MG/DL (ref 70–110)
POTASSIUM BLD-SCNC: 5.4 MMOL/L (ref 3.5–5.1)
POTASSIUM BLD-SCNC: 6.5 MMOL/L (ref 3.5–5.1)
POTASSIUM SERPL-SCNC: 6.5 MMOL/L
PROT SERPL-MCNC: 6.1 G/DL
RBC # BLD AUTO: 3.79 M/UL
SAMPLE: ABNORMAL
SITE: ABNORMAL
SODIUM BLD-SCNC: 138 MMOL/L (ref 136–145)
SODIUM BLD-SCNC: 140 MMOL/L (ref 136–145)
SODIUM SERPL-SCNC: 138 MMOL/L
SP02: 100
SP02: 100
VT: 600
VT: 600
WBC # BLD AUTO: 11.86 K/UL

## 2017-10-16 PROCEDURE — 94640 AIRWAY INHALATION TREATMENT: CPT

## 2017-10-16 PROCEDURE — 82803 BLOOD GASES ANY COMBINATION: CPT

## 2017-10-16 PROCEDURE — S0020 INJECTION, BUPIVICAINE HYDRO: HCPCS | Performed by: SURGERY

## 2017-10-16 PROCEDURE — 99220 PR INITIAL OBSERVATION CARE,LEVL III: CPT | Mod: ,,, | Performed by: INTERNAL MEDICINE

## 2017-10-16 PROCEDURE — 93010 ELECTROCARDIOGRAM REPORT: CPT | Mod: ,,, | Performed by: INTERNAL MEDICINE

## 2017-10-16 PROCEDURE — D9220A PRA ANESTHESIA: Mod: ANES,,, | Performed by: ANESTHESIOLOGY

## 2017-10-16 PROCEDURE — G0378 HOSPITAL OBSERVATION PER HR: HCPCS

## 2017-10-16 PROCEDURE — 80053 COMPREHEN METABOLIC PANEL: CPT

## 2017-10-16 PROCEDURE — 25000003 PHARM REV CODE 250: Performed by: ANESTHESIOLOGY

## 2017-10-16 PROCEDURE — 36000708 HC OR TIME LEV III 1ST 15 MIN: Performed by: SURGERY

## 2017-10-16 PROCEDURE — 27000221 HC OXYGEN, UP TO 24 HOURS

## 2017-10-16 PROCEDURE — 93005 ELECTROCARDIOGRAM TRACING: CPT | Mod: 59

## 2017-10-16 PROCEDURE — 36415 COLL VENOUS BLD VENIPUNCTURE: CPT

## 2017-10-16 PROCEDURE — 71000033 HC RECOVERY, INTIAL HOUR: Performed by: SURGERY

## 2017-10-16 PROCEDURE — 84132 ASSAY OF SERUM POTASSIUM: CPT

## 2017-10-16 PROCEDURE — 25000003 PHARM REV CODE 250: Performed by: SURGERY

## 2017-10-16 PROCEDURE — C1727 CATH, BAL TIS DIS, NON-VAS: HCPCS | Performed by: SURGERY

## 2017-10-16 PROCEDURE — 85014 HEMATOCRIT: CPT

## 2017-10-16 PROCEDURE — 85025 COMPLETE CBC W/AUTO DIFF WBC: CPT

## 2017-10-16 PROCEDURE — 71000039 HC RECOVERY, EACH ADD'L HOUR: Performed by: SURGERY

## 2017-10-16 PROCEDURE — 83605 ASSAY OF LACTIC ACID: CPT

## 2017-10-16 PROCEDURE — 63600175 PHARM REV CODE 636 W HCPCS: Performed by: ANESTHESIOLOGY

## 2017-10-16 PROCEDURE — 27201423 OPTIME MED/SURG SUP & DEVICES STERILE SUPPLY: Performed by: SURGERY

## 2017-10-16 PROCEDURE — 84295 ASSAY OF SERUM SODIUM: CPT

## 2017-10-16 PROCEDURE — 25000003 PHARM REV CODE 250: Performed by: NURSE ANESTHETIST, CERTIFIED REGISTERED

## 2017-10-16 PROCEDURE — 37000009 HC ANESTHESIA EA ADD 15 MINS: Performed by: SURGERY

## 2017-10-16 PROCEDURE — 36600 WITHDRAWAL OF ARTERIAL BLOOD: CPT

## 2017-10-16 PROCEDURE — 63600175 PHARM REV CODE 636 W HCPCS: Performed by: NURSE ANESTHETIST, CERTIFIED REGISTERED

## 2017-10-16 PROCEDURE — 94770 HC EXHALED C02 TEST: CPT

## 2017-10-16 PROCEDURE — 99900035 HC TECH TIME PER 15 MIN (STAT)

## 2017-10-16 PROCEDURE — D9220A PRA ANESTHESIA: Mod: CRNA,,, | Performed by: NURSE ANESTHETIST, CERTIFIED REGISTERED

## 2017-10-16 PROCEDURE — 94002 VENT MGMT INPAT INIT DAY: CPT

## 2017-10-16 PROCEDURE — 36000709 HC OR TIME LEV III EA ADD 15 MIN: Performed by: SURGERY

## 2017-10-16 PROCEDURE — A4216 STERILE WATER/SALINE, 10 ML: HCPCS | Performed by: SURGERY

## 2017-10-16 PROCEDURE — 25000242 PHARM REV CODE 250 ALT 637 W/ HCPCS: Performed by: ANESTHESIOLOGY

## 2017-10-16 PROCEDURE — 82550 ASSAY OF CK (CPK): CPT

## 2017-10-16 PROCEDURE — S0077 INJECTION, CLINDAMYCIN PHOSP: HCPCS | Performed by: SURGERY

## 2017-10-16 PROCEDURE — 37000008 HC ANESTHESIA 1ST 15 MINUTES: Performed by: SURGERY

## 2017-10-16 PROCEDURE — 63600175 PHARM REV CODE 636 W HCPCS: Performed by: SURGERY

## 2017-10-16 PROCEDURE — C1781 MESH (IMPLANTABLE): HCPCS | Performed by: SURGERY

## 2017-10-16 PROCEDURE — 37799 UNLISTED PX VASCULAR SURGERY: CPT

## 2017-10-16 DEVICE — MESH PROLENE 6INX6IN.: Type: IMPLANTABLE DEVICE | Site: INGUINAL | Status: FUNCTIONAL

## 2017-10-16 RX ORDER — IPRATROPIUM BROMIDE AND ALBUTEROL SULFATE 2.5; .5 MG/3ML; MG/3ML
3 SOLUTION RESPIRATORY (INHALATION) EVERY 6 HOURS
Status: DISCONTINUED | OUTPATIENT
Start: 2017-10-17 | End: 2017-10-17 | Stop reason: HOSPADM

## 2017-10-16 RX ORDER — PROPOFOL 10 MG/ML
5 INJECTION, EMULSION INTRAVENOUS CONTINUOUS PRN
Status: DISCONTINUED | OUTPATIENT
Start: 2017-10-16 | End: 2017-10-16

## 2017-10-16 RX ORDER — SODIUM CHLORIDE, SODIUM LACTATE, POTASSIUM CHLORIDE, CALCIUM CHLORIDE 600; 310; 30; 20 MG/100ML; MG/100ML; MG/100ML; MG/100ML
INJECTION, SOLUTION INTRAVENOUS CONTINUOUS PRN
Status: DISCONTINUED | OUTPATIENT
Start: 2017-10-16 | End: 2017-10-16

## 2017-10-16 RX ORDER — HYDRALAZINE HYDROCHLORIDE 20 MG/ML
10 INJECTION INTRAMUSCULAR; INTRAVENOUS ONCE
Status: COMPLETED | OUTPATIENT
Start: 2017-10-16 | End: 2017-10-16

## 2017-10-16 RX ORDER — AMLODIPINE BESYLATE 5 MG/1
10 TABLET ORAL DAILY
Status: DISCONTINUED | OUTPATIENT
Start: 2017-10-17 | End: 2017-10-17 | Stop reason: HOSPADM

## 2017-10-16 RX ORDER — LIDOCAINE HYDROCHLORIDE AND EPINEPHRINE 20; 10 MG/ML; UG/ML
INJECTION, SOLUTION INFILTRATION; PERINEURAL
Status: DISCONTINUED | OUTPATIENT
Start: 2017-10-16 | End: 2017-10-16 | Stop reason: HOSPADM

## 2017-10-16 RX ORDER — BACITRACIN 50000 [IU]/1
INJECTION, POWDER, FOR SOLUTION INTRAMUSCULAR
Status: DISCONTINUED | OUTPATIENT
Start: 2017-10-16 | End: 2017-10-16 | Stop reason: HOSPADM

## 2017-10-16 RX ORDER — INDOMETHACIN 25 MG/1
50 CAPSULE ORAL ONCE
Status: DISCONTINUED | OUTPATIENT
Start: 2017-10-16 | End: 2017-10-16

## 2017-10-16 RX ORDER — SODIUM CHLORIDE 0.9 % (FLUSH) 0.9 %
3 SYRINGE (ML) INJECTION EVERY 8 HOURS
Status: DISCONTINUED | OUTPATIENT
Start: 2017-10-16 | End: 2017-10-17 | Stop reason: HOSPADM

## 2017-10-16 RX ORDER — LORAZEPAM 2 MG/ML
0.25 INJECTION INTRAMUSCULAR ONCE AS NEEDED
Status: DISCONTINUED | OUTPATIENT
Start: 2017-10-16 | End: 2017-10-16 | Stop reason: HOSPADM

## 2017-10-16 RX ORDER — GLYCOPYRROLATE 0.2 MG/ML
INJECTION INTRAMUSCULAR; INTRAVENOUS
Status: DISCONTINUED | OUTPATIENT
Start: 2017-10-16 | End: 2017-10-16

## 2017-10-16 RX ORDER — ROCURONIUM BROMIDE 10 MG/ML
INJECTION, SOLUTION INTRAVENOUS
Status: DISCONTINUED | OUTPATIENT
Start: 2017-10-16 | End: 2017-10-16

## 2017-10-16 RX ORDER — CLINDAMYCIN PHOSPHATE 600 MG/50ML
600 INJECTION, SOLUTION INTRAVENOUS
Status: COMPLETED | OUTPATIENT
Start: 2017-10-16 | End: 2017-10-16

## 2017-10-16 RX ORDER — LIDOCAINE HCL/PF 100 MG/5ML
SYRINGE (ML) INTRAVENOUS
Status: DISCONTINUED | OUTPATIENT
Start: 2017-10-16 | End: 2017-10-16

## 2017-10-16 RX ORDER — BUPIVACAINE HYDROCHLORIDE 5 MG/ML
INJECTION, SOLUTION PERINEURAL
Status: DISCONTINUED | OUTPATIENT
Start: 2017-10-16 | End: 2017-10-16 | Stop reason: HOSPADM

## 2017-10-16 RX ORDER — PANTOPRAZOLE SODIUM 40 MG/10ML
40 INJECTION, POWDER, LYOPHILIZED, FOR SOLUTION INTRAVENOUS DAILY
Status: DISCONTINUED | OUTPATIENT
Start: 2017-10-17 | End: 2017-10-17 | Stop reason: HOSPADM

## 2017-10-16 RX ORDER — ONDANSETRON 2 MG/ML
INJECTION INTRAMUSCULAR; INTRAVENOUS
Status: DISCONTINUED | OUTPATIENT
Start: 2017-10-16 | End: 2017-10-16

## 2017-10-16 RX ORDER — FUROSEMIDE 10 MG/ML
20 INJECTION INTRAMUSCULAR; INTRAVENOUS ONCE
Status: COMPLETED | OUTPATIENT
Start: 2017-10-16 | End: 2017-10-16

## 2017-10-16 RX ORDER — FENTANYL CITRATE 50 UG/ML
INJECTION, SOLUTION INTRAMUSCULAR; INTRAVENOUS
Status: DISCONTINUED | OUTPATIENT
Start: 2017-10-16 | End: 2017-10-16

## 2017-10-16 RX ORDER — FUROSEMIDE 20 MG/1
20 TABLET ORAL ONCE
Status: DISCONTINUED | OUTPATIENT
Start: 2017-10-16 | End: 2017-10-16

## 2017-10-16 RX ORDER — LISINOPRIL 20 MG/1
40 TABLET ORAL DAILY
Status: DISCONTINUED | OUTPATIENT
Start: 2017-10-17 | End: 2017-10-17

## 2017-10-16 RX ORDER — FENTANYL CITRATE 50 UG/ML
25 INJECTION, SOLUTION INTRAMUSCULAR; INTRAVENOUS EVERY 5 MIN PRN
Status: DISCONTINUED | OUTPATIENT
Start: 2017-10-16 | End: 2017-10-16 | Stop reason: HOSPADM

## 2017-10-16 RX ORDER — SODIUM BICARBONATE 1 MEQ/ML
25 SYRINGE (ML) INTRAVENOUS ONCE
Status: COMPLETED | OUTPATIENT
Start: 2017-10-16 | End: 2017-10-16

## 2017-10-16 RX ORDER — PROPOFOL 10 MG/ML
VIAL (ML) INTRAVENOUS
Status: DISCONTINUED | OUTPATIENT
Start: 2017-10-16 | End: 2017-10-16

## 2017-10-16 RX ORDER — MORPHINE SULFATE 10 MG/ML
2 INJECTION INTRAMUSCULAR; INTRAVENOUS; SUBCUTANEOUS EVERY 4 HOURS PRN
Status: DISCONTINUED | OUTPATIENT
Start: 2017-10-16 | End: 2017-10-17 | Stop reason: HOSPADM

## 2017-10-16 RX ORDER — PHENYLEPHRINE HYDROCHLORIDE 10 MG/ML
INJECTION INTRAVENOUS
Status: DISCONTINUED | OUTPATIENT
Start: 2017-10-16 | End: 2017-10-16

## 2017-10-16 RX ORDER — HYDROMORPHONE HYDROCHLORIDE 2 MG/ML
0.2 INJECTION, SOLUTION INTRAMUSCULAR; INTRAVENOUS; SUBCUTANEOUS EVERY 5 MIN PRN
Status: COMPLETED | OUTPATIENT
Start: 2017-10-16 | End: 2017-10-16

## 2017-10-16 RX ORDER — CARVEDILOL 6.25 MG/1
12.5 TABLET ORAL 2 TIMES DAILY
Status: DISCONTINUED | OUTPATIENT
Start: 2017-10-16 | End: 2017-10-17 | Stop reason: HOSPADM

## 2017-10-16 RX ORDER — ALBUTEROL SULFATE 2.5 MG/.5ML
2.5 SOLUTION RESPIRATORY (INHALATION) ONCE
Status: COMPLETED | OUTPATIENT
Start: 2017-10-16 | End: 2017-10-16

## 2017-10-16 RX ORDER — MIDAZOLAM HYDROCHLORIDE 1 MG/ML
INJECTION, SOLUTION INTRAMUSCULAR; INTRAVENOUS
Status: DISCONTINUED | OUTPATIENT
Start: 2017-10-16 | End: 2017-10-16

## 2017-10-16 RX ORDER — SODIUM CHLORIDE 0.9 % (FLUSH) 0.9 %
3 SYRINGE (ML) INJECTION
Status: DISCONTINUED | OUTPATIENT
Start: 2017-10-16 | End: 2017-10-16 | Stop reason: HOSPADM

## 2017-10-16 RX ORDER — ATROPINE SULFATE 0.4 MG/ML
INJECTION, SOLUTION ENDOTRACHEAL; INTRAMEDULLARY; INTRAMUSCULAR; INTRAVENOUS; SUBCUTANEOUS
Status: DISCONTINUED | OUTPATIENT
Start: 2017-10-16 | End: 2017-10-16

## 2017-10-16 RX ADMIN — ROCURONIUM BROMIDE 15 MG: 10 INJECTION, SOLUTION INTRAVENOUS at 08:10

## 2017-10-16 RX ADMIN — FENTANYL CITRATE 100 MCG: 50 INJECTION INTRAMUSCULAR; INTRAVENOUS at 08:10

## 2017-10-16 RX ADMIN — HYDROMORPHONE HYDROCHLORIDE 0.2 MG: 2 INJECTION, SOLUTION INTRAMUSCULAR; INTRAVENOUS; SUBCUTANEOUS at 11:10

## 2017-10-16 RX ADMIN — PROPOFOL 140 MG: 10 INJECTION, EMULSION INTRAVENOUS at 08:10

## 2017-10-16 RX ADMIN — SODIUM CHLORIDE, SODIUM LACTATE, POTASSIUM CHLORIDE, AND CALCIUM CHLORIDE: .6; .31; .03; .02 INJECTION, SOLUTION INTRAVENOUS at 10:10

## 2017-10-16 RX ADMIN — ATROPINE SULFATE 0.2 MG: 0.4 INJECTION, SOLUTION INTRAMUSCULAR; INTRAVENOUS; SUBCUTANEOUS at 10:10

## 2017-10-16 RX ADMIN — PHENYLEPHRINE HYDROCHLORIDE 200 MCG: 10 INJECTION INTRAVENOUS at 08:10

## 2017-10-16 RX ADMIN — ATROPINE SULFATE 0.6 MG: 0.4 INJECTION, SOLUTION INTRAMUSCULAR; INTRAVENOUS; SUBCUTANEOUS at 10:10

## 2017-10-16 RX ADMIN — EPHEDRINE SULFATE 15 MG: 50 INJECTION, SOLUTION INTRAMUSCULAR; INTRAVENOUS; SUBCUTANEOUS at 08:10

## 2017-10-16 RX ADMIN — CALCIUM GLUCONATE 1000 MG: 94 INJECTION, SOLUTION INTRAVENOUS at 01:10

## 2017-10-16 RX ADMIN — ROCURONIUM BROMIDE 10 MG: 10 INJECTION, SOLUTION INTRAVENOUS at 09:10

## 2017-10-16 RX ADMIN — MORPHINE SULFATE 2 MG: 10 INJECTION INTRAVENOUS at 05:10

## 2017-10-16 RX ADMIN — CARVEDILOL 12.5 MG: 6.25 TABLET, FILM COATED ORAL at 09:10

## 2017-10-16 RX ADMIN — HYDROMORPHONE HYDROCHLORIDE 0.2 MG: 2 INJECTION, SOLUTION INTRAMUSCULAR; INTRAVENOUS; SUBCUTANEOUS at 12:10

## 2017-10-16 RX ADMIN — GLYCOPYRROLATE 0.2 MG: 0.2 INJECTION, SOLUTION INTRAMUSCULAR; INTRAVENOUS at 09:10

## 2017-10-16 RX ADMIN — GLYCOPYRROLATE 0.2 MG: 0.2 INJECTION, SOLUTION INTRAMUSCULAR; INTRAVENOUS at 10:10

## 2017-10-16 RX ADMIN — ALBUTEROL SULFATE 2.5 MG: 2.5 SOLUTION RESPIRATORY (INHALATION) at 01:10

## 2017-10-16 RX ADMIN — EPHEDRINE SULFATE 15 MG: 50 INJECTION, SOLUTION INTRAMUSCULAR; INTRAVENOUS; SUBCUTANEOUS at 10:10

## 2017-10-16 RX ADMIN — LIDOCAINE HYDROCHLORIDE 100 MG: 20 INJECTION, SOLUTION INTRAVENOUS at 08:10

## 2017-10-16 RX ADMIN — PROPOFOL 5 MCG/KG/MIN: 10 INJECTION, EMULSION INTRAVENOUS at 12:10

## 2017-10-16 RX ADMIN — CLINDAMYCIN PHOSPHATE 600 MG: 12 INJECTION, SOLUTION INTRAVENOUS at 08:10

## 2017-10-16 RX ADMIN — ONDANSETRON 4 MG: 2 INJECTION, SOLUTION INTRAMUSCULAR; INTRAVENOUS at 08:10

## 2017-10-16 RX ADMIN — MIDAZOLAM HYDROCHLORIDE 2 MG: 1 INJECTION, SOLUTION INTRAMUSCULAR; INTRAVENOUS at 10:10

## 2017-10-16 RX ADMIN — ROCURONIUM BROMIDE 30 MG: 10 INJECTION, SOLUTION INTRAVENOUS at 08:10

## 2017-10-16 RX ADMIN — SODIUM CHLORIDE, SODIUM LACTATE, POTASSIUM CHLORIDE, AND CALCIUM CHLORIDE: .6; .31; .03; .02 INJECTION, SOLUTION INTRAVENOUS at 07:10

## 2017-10-16 RX ADMIN — SODIUM BICARBONATE 25 MEQ: 84 INJECTION, SOLUTION INTRAVENOUS at 01:10

## 2017-10-16 RX ADMIN — GLYCOPYRROLATE 0.2 MG: 0.2 INJECTION, SOLUTION INTRAMUSCULAR; INTRAVENOUS at 08:10

## 2017-10-16 RX ADMIN — EPHEDRINE SULFATE 5 MG: 50 INJECTION, SOLUTION INTRAMUSCULAR; INTRAVENOUS; SUBCUTANEOUS at 08:10

## 2017-10-16 RX ADMIN — HYDRALAZINE HYDROCHLORIDE 10 MG: 20 INJECTION INTRAMUSCULAR; INTRAVENOUS at 12:10

## 2017-10-16 RX ADMIN — FUROSEMIDE 20 MG: 10 INJECTION, SOLUTION INTRAMUSCULAR; INTRAVENOUS at 12:10

## 2017-10-16 RX ADMIN — SODIUM CHLORIDE, PRESERVATIVE FREE 3 ML: 5 INJECTION INTRAVENOUS at 09:10

## 2017-10-16 NOTE — CONSULTS
[facility]  Cardiology  Consult Note    Patient Name: Mandeep Guy  MRN: [id number]  Admission Date: 10/16/2017  Hospital Length of Stay: 0 days  Code Status: Prior   Attending Provider: Ted Ashby MD   Consulting Provider: Ted Greene MD  Primary Care Physician: Shona Dunbar MD  Principal Problem:Non-recurrent inguinal hernia without obstruction or gangrene    Patient information was obtained from past medical records.     Inpatient consult to Cardiology  Consult performed by: TED GREENE  Consult ordered by: TED ASHBY  Reason for consult: bradycardia        Subjective:     Chief Complaint:  Elective hernia repair    HPI:   83M with prior hx of CAD now here for elective hernia repair.  Underwent repair this am laparoscopically, and note made of intraop bradycardia without hemodyn compromise.  Note made of multiple metabolic abnormalities.  EKG noting sinus nathaniel without evidence of hyperkalemic TW changes.  Pt currently intub and sedated. Pt seen in PACU.    Past Medical History:   Diagnosis Date    Anticoagulant long-term use     Cancer     Coronary artery disease     GERD (gastroesophageal reflux disease)     Hypertension     PAD (peripheral artery disease)     s/p stent       Past Surgical History:   Procedure Laterality Date    CHOLECYSTECTOMY      HERNIA REPAIR      stents         Review of patient's allergies indicates:   Allergen Reactions    Penicillins Rash       No current facility-administered medications on file prior to encounter.      Current Outpatient Prescriptions on File Prior to Encounter   Medication Sig    amlodipine (NORVASC) 10 MG tablet Take 10 mg by mouth once daily. Take am of surgery 9-20-17.    carvedilol (COREG) 12.5 MG tablet Take 1 tablet (12.5 mg total) by mouth 2 (two) times daily.    hydrochlorothiazide (HYDRODIURIL) 25 MG tablet Take 1 tablet (25 mg total) by mouth once daily.    lisinopril 10 MG tablet Take 40 mg by  mouth once daily.     pantoprazole (PROTONIX) 40 MG tablet Take 40 mg by mouth once daily.    polyethylene glycol (GLYCOLAX) 17 gram/dose powder Take 17 g by mouth daily as needed (constipation).    pravastatin (PRAVACHOL) 40 MG tablet Take 40 mg by mouth once daily.    aspirin 81 MG Chew Take 81 mg by mouth once daily.    clopidogrel (PLAVIX) 75 mg tablet Take 75 mg by mouth once daily.    ergocalciferol (ERGOCALCIFEROL) 50,000 unit Cap Take 50,000 Units by mouth every 7 days.     Family History     None        Social History Main Topics    Smoking status: Former Smoker     Quit date: 12/26/1995    Smokeless tobacco: Former User      Comment: pt reports stopped smoking 30 years ago    Alcohol use No    Drug use: No    Sexual activity: No     Review of Systems   Unable to perform ROS: intubated     Objective:     Vital Signs (Most Recent):  Temp: 97.5 °F (36.4 °C) (10/16/17 1102)  Pulse: (!) 42 (10/16/17 1115)  Resp: (!) 37 (10/16/17 1115)  BP: 124/61 (10/16/17 1115)  SpO2: 100 % (10/16/17 1115) Vital Signs (24h Range):  Temp:  [97.4 °F (36.3 °C)-97.5 °F (36.4 °C)] 97.5 °F (36.4 °C)  Pulse:  [42-50] 42  Resp:  [16-37] 37  SpO2:  [99 %-100 %] 100 %  BP: (104-124)/(52-61) 124/61     Weight: 78.9 kg (174 lb)  Body mass index is 26.46 kg/m².    SpO2: 100 %         Intake/Output Summary (Last 24 hours) at 10/16/17 1150  Last data filed at 10/16/17 1020   Gross per 24 hour   Intake             1000 ml   Output              155 ml   Net              845 ml       Lines/Drains/Airways     Airway                 Airway - Non-Surgical 10/16/17 0813 Endotracheal Tube less than 1 day          Peripheral Intravenous Line                 Peripheral IV - Single Lumen 09/23/17 1826 Left Hand 22 days         Peripheral IV - Single Lumen 10/16/17 0705 Left Forearm less than 1 day                Physical Exam   Constitutional: He appears well-developed and well-nourished. No distress.   HENT:   Head: Normocephalic and  atraumatic.   Eyes: No scleral icterus.   Neck: Normal range of motion. No JVD present.   Cardiovascular: Normal rate and regular rhythm.  Exam reveals distant heart sounds. Exam reveals no gallop and no friction rub.    No murmur heard.  Pulmonary/Chest: Effort normal and breath sounds normal. No respiratory distress.   Crepitus noted   Abdominal: Soft. He exhibits no distension.   Musculoskeletal: He exhibits no edema.   Neurological:   Intubated/sedated   Skin: Skin is warm and dry. He is not diaphoretic.   Psychiatric:   intub/sedated       Current Medications:        fentaNYL, HYDROmorphone, lorazepam, sodium chloride 0.9%    Laboratory:  CBC:    Recent Labs  Lab 09/18/17  1005 09/23/17  1825 09/24/17  0441 10/16/17  1115   WHITE BLOOD CELL COUNT 5.75 8.36 6.29  --    HEMOGLOBIN 12.3 L 12.8 L 10.7 L  --    POC HEMATOCRIT  --   --   --  31 L   HEMATOCRIT 36.9 L 37.8 L 32.4 L  --    PLATELETS 248 230 229  --        CHEMISTRIES:    Recent Labs  Lab 09/18/17  1005 09/23/17  1825 09/24/17  0441   GLUCOSE 98 116 H 104   SODIUM 134 L 137 138   POTASSIUM 4.9 4.6 4.6   BUN BLD 19 20 16   CREATININE 1.6 H 1.8 H 1.6 H   EGFR IF  45 A 39 A 45 A   EGFR IF NON- 39 A 34 A 39 A   CALCIUM 9.9 9.9 9.3   MAGNESIUM  --   --  1.7       CARDIAC BIOMARKERS:    Recent Labs  Lab 12/27/15  0522 12/27/15  1141 12/27/15  1757 12/27/15  1800 12/30/15  0425    H 545 H 576 H  --  73   CPK MB 1.3 1.2 1.2  --   --    TROPONIN I 0.049 H 0.072 H  --  0.040 H  --        COAGS:    Recent Labs  Lab 12/26/15  2055 08/28/17  1948   INR 1.2 1.1       LIPIDS/LFTS:    Recent Labs  Lab 08/31/17  0442 09/01/17  0507 09/02/17  0456   AST 33 21 15    H 74 H 54 H     POC 10/16/17:  K 6.5  PH 7.175  PCO2 58      Diagnostic Results:  ECG (personally reviewed tracings):   10/16/17 1134: SR 46, RBBB, no peaking of TW    Chest X-Ray (personally reviewed image(s)): pending    Echo: 12/27/15    1 - Normal left  ventricular systolic function (EF 55-60%).     2 - Concentric hypertrophy.     3 - The estimated PA systolic pressure is 23 mmHg.     4 - Trivial mitral regurgitation.     5 - Trivial tricuspid regurgitation.     Stress Test: L MPI 12/28/15  Nuclear Quantitative Functional Analysis:   LVEF: 46 %  Impression: ABNORMAL MYOCARDIAL PERFUSION  1. The perfusion scan is free of evidence for myocardial ischemia.   2. There is moderate intensity fixed defect in the inferior wall of the left ventricle, consistent with myocardial injury.   3. Resting wall motion is physiologic.   4. There is resting LV dysfunction with a reduced ejection fraction of 46 %.   5. The ventricular volumes are normal at rest and stress.   6. The extracardiac distribution of radioactivity is normal.       Assessment and Plan:     * Non-recurrent inguinal hernia without obstruction or gangrene    Per Dr. Ashby.        Bradycardia    Seems to be related to multiple metabolic abnormalities: hyperkalemia/acidosis/subcutaneous air.  Rx hyperkalemia: kayexelate +/- CaGluc/insulin/D50 if pt unable to get kayexelate.  Evaluate causes of acidosis.        Chronic renal impairment, stage 3 (moderate)    Stable        PAD (peripheral artery disease)    Stable        Essential hypertension    Cont med rx  Hold ACEi with hyperkalemia        CAD (coronary artery disease)    Appears stable at present            VTE Risk Mitigation         Ordered     Place sequential compression device  Until discontinued      10/16/17 0614          Thank you for your consult. I will follow-up with patient. Please contact us if you have any additional questions.    Ted Khalil MD  Cardiology   [facility]

## 2017-10-16 NOTE — OR NURSING
Call place to # left for us by son [phone] and number just rang then was hung up. Attempting to update family on pt status. Son's name is Mandeep Guy Jr.

## 2017-10-16 NOTE — HPI
83M with prior hx of CAD now here for elective hernia repair.  Underwent repair this am laparoscopically, and note made of intraop bradycardia without hemodyn compromise.  Note made of multiple metabolic abnormalities.  EKG noting sinus nathaniel without evidence of hyperkalemic TW changes.  Pt currently intub and sedated.

## 2017-10-16 NOTE — ASSESSMENT & PLAN NOTE
Seems to be related to multiple metabolic abnormalities: hyperkalemia/acidosis/subcutaneous air.  Rx hyperkalemia: kayexelate +/- CaGluc/insulin/D50 if pt unable to get kayexelate.  Evaluate causes of acidosis.

## 2017-10-16 NOTE — PROGRESS NOTES
1057am received patient in PACU and placed on pneuton ventilator with 100% fio2, rate 28, peep 5, tidal volume 600.  ABG done and reported to Dr. Morales.  After ABG, FIO2 down to 65%.

## 2017-10-16 NOTE — PROGRESS NOTES
Angelic F Fredo RN for lunch relief. Critical result of Potassium = 6.5 received from LAB. Dr Rojas notified. See chart for orders.   Respiratory Tx notified of Albuterol treatment order.  Will continue to monitor.

## 2017-10-16 NOTE — BRIEF OP NOTE
[facility]  Brief Operative Note    SUMMARY     Surgery Date: 10/16/2017     Surgeon(s) and Role:     * Ted Ashby MD - Primary    Assisting Surgeon: Magaly Rae, Surgery Resident    Pre-op Diagnosis:  Non-recurrent inguinal hernia without obstruction or gangrene, unspecified laterality [K40.90]    Post-op Diagnosis:  Post-Op Diagnosis Codes:     * Non-recurrent inguinal hernia without obstruction or gangrene, unspecified laterality [K40.90]    Procedure(s) (LRB):  REPAIR-HERNIA-INGUINAL-LAPAROSCOPIC (Bilateral)    Anesthesia: General    Description of Procedure: laparoscopic bilateral repair of inguinal hernias    Description of the findings of the procedure: large right and left indirect inguinal hernias, repaired with mesh. Some bradycardia intraoperatively for which cardiology was consulted for evaluation    Estimated Blood Loss: * No values recorded between 10/16/2017  8:33 AM and 10/16/2017 10:53 AM *         Specimens:   Specimen (12h ago through future)    None

## 2017-10-16 NOTE — INTERVAL H&P NOTE
The patient has been examined and the H&P has been reviewed:    I concur with the findings and no changes have occurred since H&P was written.     Off plavix for 5 days    Anesthesia/Surgery risks, benefits and alternative options discussed and understood by patient/family.          Active Hospital Problems    Diagnosis  POA    Non-recurrent inguinal hernia without obstruction or gangrene [K40.90]  Yes      Resolved Hospital Problems    Diagnosis Date Resolved POA   No resolved problems to display.

## 2017-10-16 NOTE — TRANSFER OF CARE
"Anesthesia Transfer of Care Note    Patient: Mandeep Guy    Procedure(s) Performed: Procedure(s) (LRB):  REPAIR-HERNIA-INGUINAL-LAPAROSCOPIC (Bilateral)    Patient location: PACU    Anesthesia Type: general    Transport from OR: Transported from OR intubated on 100% O2 by AMBU with adequate controlled ventilation. Continuous ECG monitoring in transport. Continuous SpO2 monitoring in transport. Upon arrival to PACU/ICU, patient attached to ventilator and auscultated to confirm bilateral breath sounds and adequate TV    Post pain: adequate analgesia    Post assessment: no apparent anesthetic complications    Post vital signs: stable    Level of consciousness: sedated    Nausea/Vomiting: no nausea/vomiting    Complications: respiratory complications, cardiovascular complications    Transfer of care protocol was followed      Last vitals:   Visit Vitals  BP (!) 121/59 (BP Location: Right arm, Patient Position: Lying)   Pulse (!) 43   Temp 36.4 °C (97.5 °F) (Axillary)   Resp 20   Ht 5' 8" (1.727 m)   Wt 78.9 kg (174 lb)   SpO2 100%   BMI 26.46 kg/m²     "

## 2017-10-16 NOTE — H&P (VIEW-ONLY)
History & Physical    SUBJECTIVE:     History of Present Illness:  Patient is a 83 y.o. male is here for follow up after lap cholecystectomy.  His pain is resolved, tolerating diet with normal bowel function.  He originally saw me for a large right inguinal hernia with bowel in the defect.  Now would like to get that repaired.    Chief Complaint   Patient presents with    Post-op Evaluation       Review of patient's allergies indicates:   Allergen Reactions    Penicillins Rash       Current Outpatient Prescriptions   Medication Sig Dispense Refill    amlodipine (NORVASC) 10 MG tablet Take 10 mg by mouth once daily. Take am of surgery 9-20-17.      aspirin 81 MG Chew Take 81 mg by mouth once daily.      carvedilol (COREG) 12.5 MG tablet Take 1 tablet (12.5 mg total) by mouth 2 (two) times daily. 60 tablet 11    clopidogrel (PLAVIX) 75 mg tablet Take 75 mg by mouth once daily.      ergocalciferol (ERGOCALCIFEROL) 50,000 unit Cap Take 50,000 Units by mouth every 7 days.      hydrochlorothiazide (HYDRODIURIL) 25 MG tablet Take 1 tablet (25 mg total) by mouth once daily. 30 tablet 11    hydrocodone-acetaminophen 5-325mg (NORCO) 5-325 mg per tablet Take 1-2 tablets by mouth every 4 (four) hours as needed for Pain. 40 tablet 0    lisinopril 10 MG tablet Take 40 mg by mouth once daily.       pantoprazole (PROTONIX) 40 MG tablet Take 40 mg by mouth once daily.      polyethylene glycol (GLYCOLAX) 17 gram/dose powder Take 17 g by mouth daily as needed (constipation). 1 Bottle 0    pravastatin (PRAVACHOL) 40 MG tablet Take 40 mg by mouth once daily.       No current facility-administered medications for this visit.        Past Medical History:   Diagnosis Date    Anticoagulant long-term use     Cancer     Coronary artery disease     GERD (gastroesophageal reflux disease)     Hypertension     PAD (peripheral artery disease)     s/p stent     Past Surgical History:   Procedure Laterality Date    HERNIA REPAIR   "    stents       No family history on file.  Social History   Substance Use Topics    Smoking status: Former Smoker     Quit date: 12/26/1995    Smokeless tobacco: Former User      Comment: pt reports stopped smoking 30 years ago    Alcohol use No        Review of Systems:    Review of Systems   Constitutional: Negative for chills and fever.   HENT: Negative.    Eyes: Negative.    Respiratory: Negative for cough and shortness of breath.    Cardiovascular: Negative for chest pain and palpitations.   Gastrointestinal: Negative for abdominal pain, nausea and vomiting.   Musculoskeletal: Negative.    Skin:        Incisions well healed   Hematological: Negative.    Psychiatric/Behavioral: Negative.        OBJECTIVE:     Vital Signs (Most Recent)     5' 7" (1.702 m)  79.4 kg (175 lb)     Physical Exam:    Physical Exam   Constitutional: He is oriented to person, place, and time. He appears well-developed and well-nourished.   HENT:   Head: Normocephalic and atraumatic.   Eyes: Pupils are equal, round, and reactive to light. No scleral icterus.   Neck: Normal range of motion. No thyromegaly present.   Cardiovascular: Normal rate and regular rhythm.    Pulmonary/Chest: Effort normal and breath sounds normal.   Abdominal: Soft. He exhibits no distension. There is no tenderness. A hernia (large RIH with bowel) is present.   Neurological: He is alert and oriented to person, place, and time.   Skin: Skin is warm and dry.   Psychiatric: He has a normal mood and affect. Thought content normal.       Laboratory      Diagnostic Results:  Path reviewed    ASSESSMENT/PLAN:     Well healed from lap kaushal  Right inguinal hernia--schedule lap [facility]  "

## 2017-10-16 NOTE — SUBJECTIVE & OBJECTIVE
Past Medical History:   Diagnosis Date    Anticoagulant long-term use     Cancer     Coronary artery disease     GERD (gastroesophageal reflux disease)     Hypertension     PAD (peripheral artery disease)     s/p stent       Past Surgical History:   Procedure Laterality Date    CHOLECYSTECTOMY      HERNIA REPAIR      stents         Review of patient's allergies indicates:   Allergen Reactions    Penicillins Rash       No current facility-administered medications on file prior to encounter.      Current Outpatient Prescriptions on File Prior to Encounter   Medication Sig    amlodipine (NORVASC) 10 MG tablet Take 10 mg by mouth once daily. Take am of surgery 9-20-17.    carvedilol (COREG) 12.5 MG tablet Take 1 tablet (12.5 mg total) by mouth 2 (two) times daily.    hydrochlorothiazide (HYDRODIURIL) 25 MG tablet Take 1 tablet (25 mg total) by mouth once daily.    lisinopril 10 MG tablet Take 40 mg by mouth once daily.     pantoprazole (PROTONIX) 40 MG tablet Take 40 mg by mouth once daily.    polyethylene glycol (GLYCOLAX) 17 gram/dose powder Take 17 g by mouth daily as needed (constipation).    pravastatin (PRAVACHOL) 40 MG tablet Take 40 mg by mouth once daily.    aspirin 81 MG Chew Take 81 mg by mouth once daily.    clopidogrel (PLAVIX) 75 mg tablet Take 75 mg by mouth once daily.    ergocalciferol (ERGOCALCIFEROL) 50,000 unit Cap Take 50,000 Units by mouth every 7 days.     Family History     None        Social History Main Topics    Smoking status: Former Smoker     Quit date: 12/26/1995    Smokeless tobacco: Former User      Comment: pt reports stopped smoking 30 years ago    Alcohol use No    Drug use: No    Sexual activity: No     Review of Systems   Unable to perform ROS: intubated     Objective:     Vital Signs (Most Recent):  Temp: 97.5 °F (36.4 °C) (10/16/17 1102)  Pulse: (!) 42 (10/16/17 1115)  Resp: (!) 37 (10/16/17 1115)  BP: 124/61 (10/16/17 1115)  SpO2: 100 % (10/16/17 1115)  Vital Signs (24h Range):  Temp:  [97.4 °F (36.3 °C)-97.5 °F (36.4 °C)] 97.5 °F (36.4 °C)  Pulse:  [42-50] 42  Resp:  [16-37] 37  SpO2:  [99 %-100 %] 100 %  BP: (104-124)/(52-61) 124/61     Weight: 78.9 kg (174 lb)  Body mass index is 26.46 kg/m².    SpO2: 100 %         Intake/Output Summary (Last 24 hours) at 10/16/17 1150  Last data filed at 10/16/17 1020   Gross per 24 hour   Intake             1000 ml   Output              155 ml   Net              845 ml       Lines/Drains/Airways     Airway                 Airway - Non-Surgical 10/16/17 0813 Endotracheal Tube less than 1 day          Peripheral Intravenous Line                 Peripheral IV - Single Lumen 09/23/17 1826 Left Hand 22 days         Peripheral IV - Single Lumen 10/16/17 0705 Left Forearm less than 1 day                Physical Exam   Constitutional: He appears well-developed and well-nourished. No distress.   HENT:   Head: Normocephalic and atraumatic.   Eyes: No scleral icterus.   Neck: Normal range of motion. No JVD present.   Cardiovascular: Normal rate and regular rhythm.  Exam reveals distant heart sounds. Exam reveals no gallop and no friction rub.    No murmur heard.  Pulmonary/Chest: Effort normal and breath sounds normal. No respiratory distress.   Crepitus noted   Abdominal: Soft. He exhibits no distension.   Musculoskeletal: He exhibits no edema.   Neurological:   Intubated/sedated   Skin: Skin is warm and dry. He is not diaphoretic.   Psychiatric:   intub/sedated       Current Medications:        fentaNYL, HYDROmorphone, lorazepam, sodium chloride 0.9%    Laboratory:  CBC:    Recent Labs  Lab 09/18/17  1005 09/23/17  1825 09/24/17  0441 10/16/17  1115   WHITE BLOOD CELL COUNT 5.75 8.36 6.29  --    HEMOGLOBIN 12.3 L 12.8 L 10.7 L  --    POC HEMATOCRIT  --   --   --  31 L   HEMATOCRIT 36.9 L 37.8 L 32.4 L  --    PLATELETS 248 230 229  --        CHEMISTRIES:    Recent Labs  Lab 09/18/17  1005 09/23/17  1825 09/24/17  0441   GLUCOSE 98 116 H  104   SODIUM 134 L 137 138   POTASSIUM 4.9 4.6 4.6   BUN BLD 19 20 16   CREATININE 1.6 H 1.8 H 1.6 H   EGFR IF  45 A 39 A 45 A   EGFR IF NON- 39 A 34 A 39 A   CALCIUM 9.9 9.9 9.3   MAGNESIUM  --   --  1.7       CARDIAC BIOMARKERS:    Recent Labs  Lab 12/27/15  0522 12/27/15  1141 12/27/15  1757 12/27/15  1800 12/30/15  0425    H 545 H 576 H  --  73   CPK MB 1.3 1.2 1.2  --   --    TROPONIN I 0.049 H 0.072 H  --  0.040 H  --        COAGS:    Recent Labs  Lab 12/26/15  2055 08/28/17  1948   INR 1.2 1.1       LIPIDS/LFTS:    Recent Labs  Lab 08/31/17  0442 09/01/17  0507 09/02/17  0456   AST 33 21 15    H 74 H 54 H     POC 10/16/17:  K 6.5  PH 7.175  PCO2 58      Diagnostic Results:  ECG (personally reviewed tracings):   10/16/17 1134: SR 46, RBBB, no peaking of TW    Chest X-Ray (personally reviewed image(s)): pending    Echo: 12/27/15    1 - Normal left ventricular systolic function (EF 55-60%).     2 - Concentric hypertrophy.     3 - The estimated PA systolic pressure is 23 mmHg.     4 - Trivial mitral regurgitation.     5 - Trivial tricuspid regurgitation.     Stress Test: L MPI 12/28/15  Nuclear Quantitative Functional Analysis:   LVEF: 46 %  Impression: ABNORMAL MYOCARDIAL PERFUSION  1. The perfusion scan is free of evidence for myocardial ischemia.   2. There is moderate intensity fixed defect in the inferior wall of the left ventricle, consistent with myocardial injury.   3. Resting wall motion is physiologic.   4. There is resting LV dysfunction with a reduced ejection fraction of 46 %.   5. The ventricular volumes are normal at rest and stress.   6. The extracardiac distribution of radioactivity is normal.

## 2017-10-16 NOTE — ANESTHESIA PREPROCEDURE EVALUATION
10/16/2017  Mandeep Guy is a 83 y.o., male.    Anesthesia Evaluation     I have reviewed the Nursing Notes.      Review of Systems  Anesthesia Hx:  No problems with previous Anesthesia   Social:  Former Smoker    Cardiovascular:   Exercise tolerance: good Denies Pacemaker. Hypertension  Denies Valvular problems/Murmurs. CAD    Denies CABG/stent.  Denies Dysrhythmias.   Denies Angina.             PVD Echo 2015: WNL   Pulmonary:  Pulmonary Normal    Renal/:  Renal/ Normal     Hepatic/GI:   No Bowel Prep. Denies PUD. GERD Denies Liver Disease. Denies Hepatitis.    Neurological:  Neurology Normal    Endocrine:  Endocrine Normal        Physical Exam  General:  Well nourished    Airway/Jaw/Neck:  AIRWAY FINDINGS: Normal           Mental Status:  Mental Status Findings: Normal        Anesthesia Plan  Type of Anesthesia, risks & benefits discussed:  Anesthesia Type:  general  Patient's Preference:   Intra-op Monitoring Plan: standard ASA monitors  Intra-op Monitoring Plan Comments:   Post Op Pain Control Plan:   Post Op Pain Control Plan Comments:   Induction:   IV  Beta Blocker:  Patient is not currently on a Beta-Blocker (No further documentation required).       Informed Consent: Patient understands risks and agrees with Anesthesia plan.  Questions answered. Anesthesia consent signed with patient.  ASA Score: 2     Day of Surgery Review of History & Physical:    H&P update referred to the surgeon.         Ready For Surgery From Anesthesia Perspective.

## 2017-10-17 VITALS
TEMPERATURE: 99 F | HEIGHT: 68 IN | RESPIRATION RATE: 18 BRPM | WEIGHT: 175 LBS | BODY MASS INDEX: 26.52 KG/M2 | DIASTOLIC BLOOD PRESSURE: 65 MMHG | HEART RATE: 60 BPM | OXYGEN SATURATION: 99 % | SYSTOLIC BLOOD PRESSURE: 147 MMHG

## 2017-10-17 PROBLEM — R06.09 DYSPNEA ON EXERTION: Status: ACTIVE | Noted: 2017-10-17

## 2017-10-17 PROBLEM — Z77.090 ASBESTOS EXPOSURE: Status: ACTIVE | Noted: 2017-10-17

## 2017-10-17 PROBLEM — T79.7XXA SUBCUTANEOUS EMPHYSEMA: Status: ACTIVE | Noted: 2017-10-17

## 2017-10-17 PROBLEM — G47.33 OSA (OBSTRUCTIVE SLEEP APNEA): Status: ACTIVE | Noted: 2017-10-17

## 2017-10-17 LAB
ANION GAP SERPL CALC-SCNC: 8 MMOL/L
BUN SERPL-MCNC: 23 MG/DL
CALCIUM SERPL-MCNC: 9.9 MG/DL
CHLORIDE SERPL-SCNC: 110 MMOL/L
CO2 SERPL-SCNC: 21 MMOL/L
CREAT SERPL-MCNC: 1.7 MG/DL
EST. GFR  (AFRICAN AMERICAN): 42 ML/MIN/1.73 M^2
EST. GFR  (NON AFRICAN AMERICAN): 36 ML/MIN/1.73 M^2
GLUCOSE SERPL-MCNC: 109 MG/DL
POCT GLUCOSE: 109 MG/DL (ref 70–110)
POTASSIUM SERPL-SCNC: 5.6 MMOL/L
SODIUM SERPL-SCNC: 139 MMOL/L

## 2017-10-17 PROCEDURE — C9113 INJ PANTOPRAZOLE SODIUM, VIA: HCPCS | Performed by: SURGERY

## 2017-10-17 PROCEDURE — 99204 OFFICE O/P NEW MOD 45 MIN: CPT | Mod: ,,, | Performed by: INTERNAL MEDICINE

## 2017-10-17 PROCEDURE — A4216 STERILE WATER/SALINE, 10 ML: HCPCS | Performed by: SURGERY

## 2017-10-17 PROCEDURE — 25000242 PHARM REV CODE 250 ALT 637 W/ HCPCS: Performed by: SURGERY

## 2017-10-17 PROCEDURE — 27000221 HC OXYGEN, UP TO 24 HOURS

## 2017-10-17 PROCEDURE — 80048 BASIC METABOLIC PNL TOTAL CA: CPT

## 2017-10-17 PROCEDURE — 94761 N-INVAS EAR/PLS OXIMETRY MLT: CPT

## 2017-10-17 PROCEDURE — 94640 AIRWAY INHALATION TREATMENT: CPT

## 2017-10-17 PROCEDURE — 63600175 PHARM REV CODE 636 W HCPCS: Performed by: SURGERY

## 2017-10-17 PROCEDURE — 36415 COLL VENOUS BLD VENIPUNCTURE: CPT

## 2017-10-17 PROCEDURE — G0378 HOSPITAL OBSERVATION PER HR: HCPCS

## 2017-10-17 PROCEDURE — 25000003 PHARM REV CODE 250: Performed by: SURGERY

## 2017-10-17 RX ORDER — FUROSEMIDE 10 MG/ML
20 INJECTION INTRAMUSCULAR; INTRAVENOUS ONCE
Status: COMPLETED | OUTPATIENT
Start: 2017-10-17 | End: 2017-10-17

## 2017-10-17 RX ORDER — OXYCODONE AND ACETAMINOPHEN 5; 325 MG/1; MG/1
1 TABLET ORAL EVERY 4 HOURS PRN
Status: DISCONTINUED | OUTPATIENT
Start: 2017-10-17 | End: 2017-10-17 | Stop reason: HOSPADM

## 2017-10-17 RX ORDER — OXYCODONE AND ACETAMINOPHEN 5; 325 MG/1; MG/1
1 TABLET ORAL EVERY 4 HOURS PRN
Qty: 25 TABLET | Refills: 0 | Status: SHIPPED | OUTPATIENT
Start: 2017-10-17

## 2017-10-17 RX ADMIN — PANTOPRAZOLE SODIUM 40 MG: 40 INJECTION, POWDER, FOR SOLUTION INTRAVENOUS at 09:10

## 2017-10-17 RX ADMIN — SODIUM CHLORIDE, PRESERVATIVE FREE 3 ML: 5 INJECTION INTRAVENOUS at 03:10

## 2017-10-17 RX ADMIN — IPRATROPIUM BROMIDE AND ALBUTEROL SULFATE 3 ML: .5; 3 SOLUTION RESPIRATORY (INHALATION) at 12:10

## 2017-10-17 RX ADMIN — IPRATROPIUM BROMIDE AND ALBUTEROL SULFATE 3 ML: .5; 3 SOLUTION RESPIRATORY (INHALATION) at 07:10

## 2017-10-17 RX ADMIN — CARVEDILOL 12.5 MG: 6.25 TABLET, FILM COATED ORAL at 09:10

## 2017-10-17 RX ADMIN — AMLODIPINE BESYLATE 10 MG: 5 TABLET ORAL at 09:10

## 2017-10-17 RX ADMIN — FUROSEMIDE 20 MG: 10 INJECTION, SOLUTION INTRAMUSCULAR; INTRAVENOUS at 09:10

## 2017-10-17 RX ADMIN — IPRATROPIUM BROMIDE AND ALBUTEROL SULFATE 3 ML: .5; 3 SOLUTION RESPIRATORY (INHALATION) at 01:10

## 2017-10-17 NOTE — ANESTHESIA POSTPROCEDURE EVALUATION
"Anesthesia Post Evaluation    Patient: Mandeep Guy    Procedure(s) Performed: Procedure(s) (LRB):  REPAIR-HERNIA-INGUINAL-LAPAROSCOPIC (Bilateral)    Final Anesthesia Type: general  Patient location during evaluation: PACU  Patient participation: Yes- Able to Participate  Level of consciousness: awake and alert and oriented  Post-procedure vital signs: reviewed and stable  Pain management: adequate  Airway patency: patent  PONV status at discharge: No PONV  Anesthetic complications: no    Carly-operative Events Comments: Intraop course significant for hypercarbia, bradycardia and subcutaneous emphysema.  Pt remained intubated in the PACU and ABG's were trended with spontaneous improvement in hypercarbia with continued controlled ventilation.  Potassium in the PACU initially 6.6, no significant changes on EKG. Pt was given calcium, bicarb, and albuterol treatments.  Potassium trended down to 5.4 with otherwise normal values on ABG.  Pt extubated in PACU without complication.  Cardiovascular status: blood pressure returned to baseline and hemodynamically stable  Respiratory status: unassisted, spontaneous ventilation and room air  Hydration status: euvolemic  Follow-up not needed.        Visit Vitals  BP (!) 156/66 (BP Location: Right arm, Patient Position: Lying)   Pulse 71   Temp 37.2 °C (98.9 °F) (Oral)   Resp 18   Ht 5' 8" (1.727 m)   Wt 79.4 kg (175 lb)   SpO2 96%   BMI 26.61 kg/m²       Pain/Simona Score: Pain Assessment Performed: Yes (10/17/2017 10:00 AM)  Presence of Pain: complains of pain/discomfort (10/17/2017  7:30 AM)  Pain Rating Prior to Med Admin: 6 (10/16/2017  5:17 PM)  Pain Rating Post Med Admin: 3 (10/16/2017  6:00 PM)  Simona Score: 10 (10/16/2017  6:00 PM)      "

## 2017-10-17 NOTE — ASSESSMENT & PLAN NOTE
No evidence of pleural plaques consistent with asbestos exposure, however he does have increase interstitial markings on chest xray.   · Repeat CXR prior to follow up with outpatient pulm for further evaluation

## 2017-10-17 NOTE — PLAN OF CARE
SW met with pt at bedside. SW explained role in . SW inquired about HELP AT HOME. Pt stated that pt will have help at home with Dtr Johanna. Pt stated that if necessary he will have Daughter. SW reviewed HELP AT HOME and DISCHARGE PLANNING brochure of questions to think about while in the hospital. Pt voiced understanding. SW inquired about what pt feels he are RESPONSIBLE for to MANAGE HEALTH AT HOME. Pt stated going to MD appointments and medications. SW voiced that taking medications at the appropriate time is important with managing care. SW informed pt that a DISCHARGE EDUCATION SHEET will be provided during stay. Pt voiced understanding.       [id number] - [place], [place] - [address]  [address]  [facility] [address]  Phone: [phone] Fax: [phone]       10/17/17 1146   Discharge Assessment   Assessment Type Discharge Planning Assessment   Confirmed/corrected address and phone number on facesheet? Yes   Assessment information obtained from? Patient;Caregiver   Prior to hospitilization cognitive status: Alert/Oriented   Prior to hospitalization functional status: Independent   Current cognitive status: Alert/Oriented   Current Functional Status: Independent   Lives With alone  (Dtr will be helping at home)   Able to Return to Prior Arrangements yes   Is patient able to care for self after discharge? Yes   Who are your caregiver(s) and their phone number(s)? DTR Johanna [phone]   Patient's perception of discharge disposition home or selfcare;home health  ([facility] )   Readmission Within The Last 30 Days no previous admission in last 30 days   Equipment Currently Used at Home cane, straight   Do you have any problems affording any of your prescribed medications? No   Is the patient taking medications as prescribed? yes   Does the patient have transportation home? Yes   Transportation Available family or friend will provide   Discharge  Plan A Home with family;Home Health   Discharge Plan B Home with family   Patient/Family In Agreement With Plan yes   Does the patient have transportation to healthcare appointments? Yes

## 2017-10-17 NOTE — ASSESSMENT & PLAN NOTE
Patient has a myriad of exposures that could be contributing to his dyspnea: tobacco abuse, occupational exposures, potential DAINELLE.  During my evaluation he was saturating 98% on room air and speaking in full sentences without evidence of breathlessness.      · Suggest a follow up with outpatient pulmonology- either myself or Dr. Miguel Ángel Koo.  · Patient will need full PFTs (spirometry, lung volumes, DLCO)  prior to visit

## 2017-10-17 NOTE — PROGRESS NOTES
"SW met with pt at bedside. SW reviewed discharge education sheet "Post Op Surgery" with pt. Pt voiced understanding. Teachback method applied with pt. SW also reviewed with pt what she is responsible for in order to manage her health at home.     1) Getting medications taking from pharmacy.  2) Taking medications as prescribed.  3) Making Appointments that are scheduled.    Pt verbalized understanding.     "

## 2017-10-17 NOTE — PROGRESS NOTES
"Anika, PHN Reg, informed [place] that pt's home health has been resumed with [facility]. All information added to "follow up" tab.   "

## 2017-10-17 NOTE — PROGRESS NOTES
[facility]  General Surgery  Progress Note    Subjective:     Interval History: o/n no acute events. Denies any CP or SOB. No flatus. No N/v. Feels hungry. States he has some soreness in his groin region    Post-Op Info:  Procedure(s) (LRB):  REPAIR-HERNIA-INGUINAL-LAPAROSCOPIC (Bilateral)   1 Day Post-Op      Medications:  Continuous Infusions:   Scheduled Meds:   albuterol-ipratropium 2.5mg-0.5mg/3mL  3 mL Nebulization Q6H    amlodipine  10 mg Oral Daily    carvedilol  12.5 mg Oral BID    lisinopril  40 mg Oral Daily    pantoprazole  40 mg Intravenous Daily    sodium chloride 0.9%  3 mL Intravenous Q8H     PRN Meds:morphine, oxycodone-acetaminophen, promethazine (PHENERGAN) IVPB     Objective:     Vital Signs (Most Recent):  Temp: 98.9 °F (37.2 °C) (10/17/17 0854)  Pulse: 70 (10/17/17 0854)  Resp: 18 (10/17/17 0854)  BP: (!) 171/67 (10/17/17 0854)  SpO2: 98 % (10/17/17 0854) Vital Signs (24h Range):  Temp:  [96.2 °F (35.7 °C)-99.4 °F (37.4 °C)] 98.9 °F (37.2 °C)  Pulse:  [42-76] 70  Resp:  [10-37] 18  SpO2:  [94 %-100 %] 98 %  BP: (101-196)/(51-84) 171/67       Intake/Output Summary (Last 24 hours) at 10/17/17 0901  Last data filed at 10/17/17 0730   Gross per 24 hour   Intake             1240 ml   Output             2880 ml   Net            -1640 ml       Physical Exam  NAD, AAO  Some coarse breath sounds bilaterally  RRR  Soft, Nd, appropriately tender to palpation over incisions in groin region  No edema  Chest and neck crepitus resolved    Significant Labs:  BMP:   Recent Labs  Lab 10/17/17  0647         K 5.6*      CO2 21*   BUN 23   CREATININE 1.7*   CALCIUM 9.9     CBC:   Recent Labs  Lab 10/16/17  1133 10/16/17  1423   WBC 11.86  --    RBC 3.79*  --    HGB 11.5*  --    HCT 35.3* 35*     --    MCV 93  --    MCH 30.3  --    MCHC 32.6  --        Significant Diagnostics:  CXR: I have reviewed all pertinent results/findings within the past 24 hours.  subcutaneous air noted    Assessment/Plan:   82 y/o male s/p lap bilateral inguinal hernia repairs with some intraoperative bradycardia and respiratory distress    Lytes better this AM  Lasix x 1  If tolerates diet will plan on discharging later today once cleared by cardiology    Active Diagnoses:    Diagnosis Date Noted POA    PRINCIPAL PROBLEM:  Non-recurrent inguinal hernia without obstruction or gangrene [K40.90] 10/16/2017 Yes    Bradycardia following surgery [I97.89] 10/16/2017 Yes    Chronic renal impairment, stage 3 (moderate) [N18.3] 10/16/2017 Yes    CAD (coronary artery disease) [I25.10] 08/29/2017 Yes     Chronic    Essential hypertension [I10] 08/29/2017 Yes     Chronic    PAD (peripheral artery disease) [I73.9] 08/29/2017 Yes     Chronic      Problems Resolved During this Admission:    Diagnosis Date Noted Date Resolved POA         Magaly Rae MD  General Surgery  [facility]

## 2017-10-17 NOTE — HPI
Mr. Guy is an 83 year old man admitted for inguinal hernia repair.  His hospital course was complicated by intraoperative bradycardia.  He was able to be extubated shortly after the procedure.  Chest imaging while intubated was significant for subcutaneous emphysema.      Overall, Mr. Guy reports that for the past year he has felt a decline in his respiratory status.  He reports that he feels short of breath at rest, more so when exerting himself, and even awakens from sleep gasping for air.  He states he can only walk a few blocks without having to stop for rest. No associated chest pain.  Some leg swelling occasionally that he attributes to diet noncompliance. He mentioned that he was previously prescribed a 'pap' machine many years ago but his breathing improved so he stopped using it.     He has a 40 packyear history of smoking, but quit 6 years ago.  He has never been diagnosed with COPD, nor treated with inhalers.  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

## 2017-10-17 NOTE — SUBJECTIVE & OBJECTIVE
Past Medical History:   Diagnosis Date    Anticoagulant long-term use     Cancer     Coronary artery disease     GERD (gastroesophageal reflux disease)     Hypertension     PAD (peripheral artery disease)     s/p stent       Past Surgical History:   Procedure Laterality Date    CHOLECYSTECTOMY      HERNIA REPAIR      stents         Review of patient's allergies indicates:   Allergen Reactions    Penicillins Rash       Family History     None        Social History Main Topics    Smoking status: Former Smoker     Quit date: 12/26/1995    Smokeless tobacco: Former User      Comment: pt reports stopped smoking 30 years ago    Alcohol use No    Drug use: No    Sexual activity: No         Review of Systems   Constitutional: Positive for activity change.   HENT: Negative.    Eyes: Negative.    Respiratory: Positive for shortness of breath. Negative for cough and chest tightness.    Cardiovascular: Positive for leg swelling. Negative for chest pain.   Gastrointestinal: Negative.    Endocrine: Negative.    Genitourinary: Negative.    Musculoskeletal: Negative.    Skin: Negative.    Allergic/Immunologic: Negative.    Neurological: Negative.    Hematological: Negative.    Psychiatric/Behavioral: Positive for sleep disturbance.     Objective:     Vital Signs (Most Recent):  Temp: 98.9 °F (37.2 °C) (10/17/17 0854)  Pulse: 70 (10/17/17 0854)  Resp: 18 (10/17/17 0854)  BP: (!) 171/67 (10/17/17 0854)  SpO2: 98 % (10/17/17 0854) Vital Signs (24h Range):  Temp:  [96.2 °F (35.7 °C)-99.4 °F (37.4 °C)] 98.9 °F (37.2 °C)  Pulse:  [42-76] 70  Resp:  [10-37] 18  SpO2:  [94 %-100 %] 98 %  BP: (101-196)/(51-84) 171/67     Weight: 79.4 kg (175 lb)  Body mass index is 26.61 kg/m².      Intake/Output Summary (Last 24 hours) at 10/17/17 1008  Last data filed at 10/17/17 0730   Gross per 24 hour   Intake              240 ml   Output             2880 ml   Net            -2640 ml       Physical Exam   Constitutional: He is oriented  to person, place, and time. He appears well-developed and well-nourished.   HENT:   Head: Normocephalic and atraumatic.   MP IV   Eyes: Pupils are equal, round, and reactive to light.   Neck: Normal range of motion. No tracheal deviation present.   Cardiovascular: Normal rate and regular rhythm.    No murmur heard.  Pulmonary/Chest: Effort normal and breath sounds normal. No respiratory distress. He has no wheezes. He has no rales. He exhibits no tenderness.   -O2 Sat is 98% on room air  -Able to speak in full sentences without evidence of breathlessness  -Subcutaneous emphysema present in the neck and clavicular region   Abdominal: Soft. Bowel sounds are normal. He exhibits no distension. There is no tenderness.   Musculoskeletal: He exhibits no edema.   Neurological: He is alert and oriented to person, place, and time.   Skin: Skin is warm and dry.   Psychiatric: He has a normal mood and affect. His behavior is normal.   Vitals reviewed.      Vents:  Vent Mode: A/C (10/16/17 1315)  Set Rate: 4 bmp (10/16/17 1547)  Vt Set: 600 mL (10/16/17 1547)  PEEP/CPAP: 5 cmH20 (10/16/17 1547)  Oxygen Concentration (%): 98 (10/17/17 0936)    Lines/Drains/Airways     Peripheral Intravenous Line                 Peripheral IV - Single Lumen 09/23/17 1826 Left Hand 23 days         Peripheral IV - Single Lumen 10/16/17 0705 Left Forearm 1 day         Peripheral IV - Single Lumen 10/16/17 1404 Left Upper Arm less than 1 day                Significant Labs:    CBC/Anemia Profile:    Recent Labs  Lab 10/16/17  1115 10/16/17  1133 10/16/17  1423   WBC  --  11.86  --    HGB  --  11.5*  --    HCT 31* 35.3* 35*   PLT  --  253  --    MCV  --  93  --    RDW  --  15.1*  --         Chemistries:    Recent Labs  Lab 10/16/17  1133 10/17/17  0647    139   K 6.5* 5.6*   * 110   CO2 18* 21*   BUN 21 23   CREATININE 1.6* 1.7*   CALCIUM 9.2 9.9   ALBUMIN 3.1*  --    PROT 6.1  --    BILITOT 0.4  --    ALKPHOS 102  --    ALT 15  --    AST  15  --        All pertinent labs within the past 24 hours have been reviewed.    Significant Imaging:   I have reviewed all pertinent imaging results/findings within the past 24 hours.

## 2017-10-17 NOTE — NURSING
Transfer to main entrance per escort with son at side. No acute distress noted. Discharge instructions reviewed with patient and his son prior to transportation

## 2017-10-17 NOTE — CONSULTS
[facility]  Pulmonology  Consult Note    Patient Name: Mandeep Guy  MRN: [id number]  Admission Date: 10/16/2017  Hospital Length of Stay: 0 days  Code Status: Full Code  Attending Physician: Ted Ashby MD  Primary Care Provider: Shona Dunbar MD   Principal Problem: Dyspnea    Consults  Subjective:     HPI:  Mr. Guy is an 83 year old man admitted for inguinal hernia repair.  His hospital course was complicated by intraoperative bradycardia.  He was able to be extubated shortly after the procedure.  Chest imaging while intubated was significant for subcutaneous emphysema.      Overall, Mr. Guy reports that for the past year he has felt a decline in his respiratory status.  He reports that he feels short of breath at rest, more so when exerting himself, and even awakens from sleep gasping for air.  He states he can only walk a few blocks without having to stop for rest. No associated chest pain.  Some leg swelling occasionally that he attributes to diet noncompliance. He mentioned that he was previously prescribed a 'pap' machine many years ago but his breathing improved so he stopped using it.     He has a 40 packyear history of smoking, but quit 6 years ago.  He has never been diagnosed with COPD, nor treated with inhalers.  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    Past Medical History:   Diagnosis Date    Anticoagulant long-term use     Cancer     Coronary artery disease     GERD (gastroesophageal reflux disease)     Hypertension     PAD (peripheral artery disease)     s/p stent       Past Surgical History:   Procedure Laterality Date    CHOLECYSTECTOMY      HERNIA REPAIR      stents         Review of patient's allergies indicates:   Allergen Reactions    Penicillins Rash       Family History     None        Social History Main Topics    Smoking status: Former Smoker     Quit date:  12/26/1995    Smokeless tobacco: Former User      Comment: pt reports stopped smoking 30 years ago    Alcohol use No    Drug use: No    Sexual activity: No         Review of Systems   Constitutional: Positive for activity change.   HENT: Negative.    Eyes: Negative.    Respiratory: Positive for shortness of breath. Negative for cough and chest tightness.    Cardiovascular: Positive for leg swelling. Negative for chest pain.   Gastrointestinal: Negative.    Endocrine: Negative.    Genitourinary: Negative.    Musculoskeletal: Negative.    Skin: Negative.    Allergic/Immunologic: Negative.    Neurological: Negative.    Hematological: Negative.    Psychiatric/Behavioral: Positive for sleep disturbance.     Objective:     Vital Signs (Most Recent):  Temp: 98.9 °F (37.2 °C) (10/17/17 0854)  Pulse: 70 (10/17/17 0854)  Resp: 18 (10/17/17 0854)  BP: (!) 171/67 (10/17/17 0854)  SpO2: 98 % (10/17/17 0854) Vital Signs (24h Range):  Temp:  [96.2 °F (35.7 °C)-99.4 °F (37.4 °C)] 98.9 °F (37.2 °C)  Pulse:  [42-76] 70  Resp:  [10-37] 18  SpO2:  [94 %-100 %] 98 %  BP: (101-196)/(51-84) 171/67     Weight: 79.4 kg (175 lb)  Body mass index is 26.61 kg/m².      Intake/Output Summary (Last 24 hours) at 10/17/17 1008  Last data filed at 10/17/17 0730   Gross per 24 hour   Intake              240 ml   Output             2880 ml   Net            -2640 ml       Physical Exam   Constitutional: He is oriented to person, place, and time. He appears well-developed and well-nourished.   HENT:   Head: Normocephalic and atraumatic.   MP IV   Eyes: Pupils are equal, round, and reactive to light.   Neck: Normal range of motion. No tracheal deviation present.   Cardiovascular: Normal rate and regular rhythm.    No murmur heard.  Pulmonary/Chest: Effort normal and breath sounds normal. No respiratory distress. He has no wheezes. He has no rales. He exhibits no tenderness.   -O2 Sat is 98% on room air  -Able to speak in full sentences without  evidence of breathlessness  -Subcutaneous emphysema present in the neck and clavicular region   Abdominal: Soft. Bowel sounds are normal. He exhibits no distension. There is no tenderness.   Musculoskeletal: He exhibits no edema.   Neurological: He is alert and oriented to person, place, and time.   Skin: Skin is warm and dry.   Psychiatric: He has a normal mood and affect. His behavior is normal.   Vitals reviewed.      Vents:  Vent Mode: A/C (10/16/17 1315)  Set Rate: 4 bmp (10/16/17 1547)  Vt Set: 600 mL (10/16/17 1547)  PEEP/CPAP: 5 cmH20 (10/16/17 1547)  Oxygen Concentration (%): 98 (10/17/17 0936)    Lines/Drains/Airways     Peripheral Intravenous Line                 Peripheral IV - Single Lumen 09/23/17 1826 Left Hand 23 days         Peripheral IV - Single Lumen 10/16/17 0705 Left Forearm 1 day         Peripheral IV - Single Lumen 10/16/17 1404 Left Upper Arm less than 1 day                Significant Labs:    CBC/Anemia Profile:    Recent Labs  Lab 10/16/17  1115 10/16/17  1133 10/16/17  1423   WBC  --  11.86  --    HGB  --  11.5*  --    HCT 31* 35.3* 35*   PLT  --  253  --    MCV  --  93  --    RDW  --  15.1*  --         Chemistries:    Recent Labs  Lab 10/16/17  1133 10/17/17  0647    139   K 6.5* 5.6*   * 110   CO2 18* 21*   BUN 21 23   CREATININE 1.6* 1.7*   CALCIUM 9.2 9.9   ALBUMIN 3.1*  --    PROT 6.1  --    BILITOT 0.4  --    ALKPHOS 102  --    ALT 15  --    AST 15  --        All pertinent labs within the past 24 hours have been reviewed.    Significant Imaging:   I have reviewed all pertinent imaging results/findings within the past 24 hours.    Assessment/Plan:     Dyspnea on exertion    Patient has a myriad of exposures that could be contributing to his dyspnea: tobacco abuse, occupational exposures, potential DANIELLE.  During my evaluation he was saturating 98% on room air and speaking in full sentences without evidence of breathlessness.      · Suggest a follow up with outpatient  pulmonology- either myself or Dr. Miguel Ángel Koo.  · Patient will need full PFTs (spirometry, lung volumes, DLCO)  prior to visit        DANIELLE (obstructive sleep apnea)    Patient does not know if he snores; no witness apnea, but falls asleep easily, reports sleep disturbances consistent with DANIELLE.  Should be evaluated with polysomnography.        Asbestos exposure    No evidence of pleural plaques consistent with asbestos exposure, however he does have increase interstitial markings on chest xray.   · Repeat CXR prior to follow up with outpatient pulm for further evaluation        Subcutaneous emphysema    Generally a self-limiting process.  No intervention required.              Thank you for your consult. I will sign off. Please contact us if you have any additional questions.     Thelma Boateng MD  Pulmonology  [facility]  [phone] (c)

## 2017-10-17 NOTE — PLAN OF CARE
Problem: Patient Care Overview  Goal: Plan of Care Review  Outcome: Ongoing (interventions implemented as appropriate)   10/17/17 3266   Coping/Psychosocial   Plan Of Care Reviewed With patient   Abdomen wound with durabond intact. No drainage to wound. No signs of infection. Ambulating in room. Steady gait presnet. Complains of tenderness but not severe pain

## 2017-10-17 NOTE — ASSESSMENT & PLAN NOTE
Patient does not know if he snores; no witness apnea, but falls asleep easily, reports sleep disturbances consistent with DANIELLE.  Should be evaluated with polysomnography.

## 2017-10-18 NOTE — OP NOTE
DATE OF PROCEDURE:  10/16/2017    SURGEON:  Ted Ashby M.D.    FIRST ASSISTANT:  Salma Rae M.D., PGY4    PREOPERATIVE DIAGNOSIS:  Right inguinal hernia.    POSTOPERATIVE DIAGNOSIS:  Bilateral inguinal hernias.    PROCEDURE:  Laparoscopic repair of bilateral inguinal hernias.    ANESTHESIA:  General endotracheal.    ESTIMATED BLOOD LOSS:  Less than 5 mL    INDICATIONS FOR PROCEDURE:  The patient is an 83-year-old male with a large   uncomfortable right inguinal hernia.  He has a loose external ring on the left,   but no clear hernia palpable.  He has previously been scheduled for repair, but   this was delayed due to the patient developed choledocholithiasis, which has now   been addressed and he has recovered.  The patient will be taken for   laparoscopic repair of his inguinal hernia.  The risks and benefits of the   procedure have been explained to the patient, who acknowledges these risks and   wishes to proceed.    PROCEDURE IN DETAIL:  After administration of IV antibiotics and placement of   sequential compression devices, the patient was intubated by Anesthesia and   sterilely prepped and draped.  All ports were infiltrated with local anesthetic   prior to incision.  An incision was made in the base of the umbilicus.  The   subcutaneous tissue was divided with electrocautery down to the anterior rectus   fascia.  The fascia was grasped with a Kocher clamp and retracted laterally.  An   incision was made in the anterior leaflet of the rectus fascia.  The S   retractor was placed into the posterior rectus space and the balloon dissector   was then guided into this space.  However, when the balloon dissector was   insufflated, it was noted to be within the peritoneal cavity.  For this reason,   the balloon dissector was deflated and removed and the fascia was incised on the   opposite side of midline in the anterior leaflet.  Again the balloon dissector   was placed and inflated under direct  visualization.  The stomach was within the   preperitoneal space.  The balloon was deflated and removed and the port was   connected to insufflation and a pneumopreperitoneum to 15 mmHg was obtained.    Two 5 mm ports were placed inferior to the umbilicus in the midline.  The   balloon dissector had dissected the left side very well and a very obvious   rather large left inguinal hernia defect was noted.  For this reason, the left   side was reduced and repaired as well.  The pubic bone was cleared away of the   loose areolar tissue, exposing Ciro's ligament.  The peritoneum was then swept   away from the abdominal wall laterally.  This peritoneum was followed down into   the right inguinal canal where a very large hernia was identified.  This was   gently reduced and  from the spermatic cords and completely reduced.  A   large cord lipoma was identified as well.  The vas deferens and cord vessels   were identified and preserved.  Once the cord structures were skeletonized, the   left side was dissected in a similar fashion with similar findings of a   moderate-to-large sized left inguinal hernia.  The 2 pieces of 6 x 6 inch   Prolene mesh were trimmed to 4 x 6 inches with a 2.5 inch slit placed 2.5 inches   from the lateral edge of the mesh.  The mesh was rolled up and inserted into   the preperitoneal space and then unrolled within the space.  This was guided   behind the spermatic cord.  Tacks were placed through the mesh into Ciro's   ligament medially and into either edge of the mesh wrapped around the spermatic   cord.  The mesh was inspected and found to be snug around the cord, but not so   tight as to be ischemic.  The other piece of mesh was placed in a similar   fashion.  The insufflation was then expelled from the preperitoneal space.  The   fascia was reapproximated using interrupted 0 Vicryl sutures.  Skin was closed   with subcuticular 4-0 Monocryl.  Dermabond was applied and the patient  was   aroused and transferred to Recovery Room.  All instrument and sponge counts were   correct at the end of the case.    The patient did develop bradycardia on more than one occasion during the case,   but continued to have an excellent pulse and was making excellent urine.  Again,   this was felt to be due to hypercarbia and there was no improvement with   release of the pneumopreperitoneum suggesting this was more of a respiratory   problem.  The procedure was almost completed, so the pneumopreperitoneum had   been reaccumulated to allow placement of the mesh prior to arousal.  Again, the   patient was hemodynamically stable throughout with the exception of bradycardia.      KHALIF  dd: 10/18/2017 07:29:04 (CDT)  td: 10/18/2017 08:49:39 (CDT)  Doc ID   #[id number]  Job ID #[id number]    CC:

## 2017-10-18 NOTE — DISCHARGE SUMMARY
[facility]  General Surgery  Discharge Summary      Patient Name: Mandeep Guy  MRN: [id number]  Admission Date: 10/16/2017  Hospital Length of Stay: 0 days  Discharge Date and Time: 10/17/2017  6:20 PM  Attending Physician: No att. providers found   Discharging Provider: Magaly Rae MD  Primary Care Provider: Shona Dunbar MD     HPI: 82 y/o male who presented for elective inguinal hernia repair    Procedure(s) (LRB):  REPAIR-HERNIA-INGUINAL-LAPAROSCOPIC (Bilateral)     Hospital Course: During his surgery he became bradycardic and hypercapnic. The surgery was completed without any complications but cardiology and pulmonology were consulted post operatively given his respiratory and hemodynamic issues intraoperatively. He has a significant amount of subcutaneous emphysema that developed from the surgery given that it was laparoscopic. He had some electrolyte derangements that were corrected as well. He was cleared by cardiology and pulmonology with outpatient follow up. He tolerated a regular diet and his pain was controlled with PO pain pills and he was deemed appropriate for discharge on POD 1.     Consults:   Consults         Status Ordering Provider     Inpatient consult to Cardiology  Once     Provider:  Ted Khalil MD    Completed TED BUITRAGO        Pulmonology    Significant Diagnostic Studies: Labs:   BMP:   Recent Labs  Lab 10/17/17  0647         K 5.6*      CO2 21*   BUN 23   CREATININE 1.7*   CALCIUM 9.9    and CBC No results for input(s): WBC, HGB, HCT, PLT in the last 48 hours.    Pending Diagnostic Studies:     None        Final Active Diagnoses:    Diagnosis Date Noted POA    PRINCIPAL PROBLEM:  Non-recurrent inguinal hernia without obstruction or gangrene [K40.90] 10/16/2017 Yes    Dyspnea on exertion [R06.09] 10/17/2017 Unknown    Asbestos exposure [Z77.090] 10/17/2017 Not Applicable    DANIELLE (obstructive sleep apnea) [G47.33] 10/17/2017  Unknown    Subcutaneous emphysema [T79.7XXA] 10/17/2017 Yes      Problems Resolved During this Admission:    Diagnosis Date Noted Date Resolved POA      Discharged Condition: good    Disposition: Home or Self Care    Follow Up:  Follow-up Information     Ted Ashby MD On 11/2/2017.    Specialties:  General Surgery, Bariatrics  Why:  Hospital Follow up appointment on Thursday at 9:50am.   Contact information:  [address]  [address]  [address] [place]  [place] [address]  [phone]             [facility] On 10/20/2017.    Why:  Cardiology Appointment on Friday at 8:50am  Contact information:  [address]  [place] [address]  [phone]             [facility].    Why:  Home Health: Skilled Nurse  Contact information:  [address]  [address]  [facility] [address]  [phone]             Thelma Boateng MD In 1 month.    Specialty:  Pulmonary Disease  Why:  f/u dyspnea in hospital. Needs PFTs prior to apt  Contact information:  [address] [address]  [phone]             PFT, PULMONARY FUNCTION TESTING In 3 weeks.               Patient Instructions:     Ambulatory referral to Home Health   Referral Priority: Routine Referral Type: Home Health   Referral Reason: Specialty Services Required    Requested Specialty: Home Health Services    Number of Visits Requested: 1      Diet general     Activity as tolerated     Lifting restrictions   Order Comments: No lifting more than 10-15lbs until cleared by surgeon.     Call MD for:  persistent nausea and vomiting or diarrhea     Call MD for:  severe uncontrolled pain     Call MD for:  redness, tenderness, or signs of infection (pain, swelling, redness, odor or green/yellow discharge around incision site)     No dressing needed   Order Comments: May shower. No baths, swimming or soaking incisions in water.       Medications:  Reconciled Home Medications:   Discharge Medication List as of  10/17/2017  6:35 PM      START taking these medications    Details   oxycodone-acetaminophen (PERCOCET) 5-325 mg per tablet Take 1 tablet by mouth every 4 (four) hours as needed., Starting Tue 10/17/2017, Print         CONTINUE these medications which have NOT CHANGED    Details   amlodipine (NORVASC) 10 MG tablet Take 10 mg by mouth once daily. Take am of surgery 9-20-17., Historical Med      aspirin 81 MG Chew Take 81 mg by mouth once daily., Until Discontinued, Historical Med      carvedilol (COREG) 12.5 MG tablet Take 1 tablet (12.5 mg total) by mouth 2 (two) times daily., Starting Sat 9/2/2017, Until Sun 9/2/2018, Normal      clopidogrel (PLAVIX) 75 mg tablet Take 75 mg by mouth once daily., Historical Med      ergocalciferol (ERGOCALCIFEROL) 50,000 unit Cap Take 50,000 Units by mouth every 7 days., Historical Med      hydrochlorothiazide (HYDRODIURIL) 25 MG tablet Take 1 tablet (25 mg total) by mouth once daily., Starting Sun 9/3/2017, Until Mon 9/3/2018, Normal      lisinopril 10 MG tablet Take 40 mg by mouth once daily. , Starting Sun 8/13/2017, Historical Med      pantoprazole (PROTONIX) 40 MG tablet Take 40 mg by mouth once daily., Until Discontinued, Historical Med      polyethylene glycol (GLYCOLAX) 17 gram/dose powder Take 17 g by mouth daily as needed (constipation)., Starting 1/1/2016, Until Discontinued, Print      pravastatin (PRAVACHOL) 40 MG tablet Take 40 mg by mouth once daily., Until Discontinued, Historical Med             Magaly Rae MD  General Surgery  [facility]

## 2017-10-25 NOTE — PHYSICIAN QUERY
PT Name: Mandeep Guy  MR #: [id number]     Physician Query Form - Documentation Clarification      CDS/: Mee Crawford RN  CCDS               Contact information: [email or web address]    This form is a permanent document in the medical record.     Query Date: October 25, 2017    By submitting this query, we are merely seeking further clarification of documentation. Please utilize your independent clinical judgment when addressing the question(s) below.    The Medical record reflects the following:    Supporting Clinical Findings Location in Medical Record    82 y/o male s/p lap kaushal on 9/20 for choledocholithiasis presents with likely post operative ileus vs SBO   Begin clears and see if tolerates   ALEC slightly better Cr from 1.8 to 1.6   IVF      BUN 20, creat 1.8, GFR 34   BUN 16, creat 1.6, GFR 39    Surgery progress note 9/24                 Labs 9/23   Labs 9/24    Once he tolerated regular diet without nausea or vomiting he was deemed appropriate for discharge. His Cr was slightly elevated on admission but decreased by the time he was discharged. It was likely secondary to dehydration     Discharge Summary                                                                         Doctor, Please specify diagnosis or diagnoses associated with above clinical findings.    Provider Use Only        [  ]  Acute kidney injury (ALEC)      [ x ]  Dehydration only      [  ]  Clinically undetermined

## 2018-07-16 ENCOUNTER — OFFICE VISIT (OUTPATIENT)
Dept: UROLOGY | Facility: CLINIC | Age: 83
End: 2018-07-16
Payer: MEDICARE

## 2018-07-16 VITALS
HEART RATE: 58 BPM | BODY MASS INDEX: 26.53 KG/M2 | HEIGHT: 68 IN | DIASTOLIC BLOOD PRESSURE: 82 MMHG | RESPIRATION RATE: 15 BRPM | WEIGHT: 175.06 LBS | SYSTOLIC BLOOD PRESSURE: 164 MMHG | OXYGEN SATURATION: 98 %

## 2018-07-16 DIAGNOSIS — N43.3 BILATERAL HYDROCELE: ICD-10-CM

## 2018-07-16 DIAGNOSIS — R35.0 FREQUENCY OF URINATION: Primary | ICD-10-CM

## 2018-07-16 DIAGNOSIS — N13.8 BPH WITH OBSTRUCTION/LOWER URINARY TRACT SYMPTOMS: ICD-10-CM

## 2018-07-16 DIAGNOSIS — N40.1 BPH WITH OBSTRUCTION/LOWER URINARY TRACT SYMPTOMS: ICD-10-CM

## 2018-07-16 DIAGNOSIS — N39.41 URGE INCONTINENCE: ICD-10-CM

## 2018-07-16 DIAGNOSIS — R35.1 NOCTURIA: ICD-10-CM

## 2018-07-16 PROCEDURE — 3079F DIAST BP 80-89 MM HG: CPT | Mod: CPTII,S$GLB,, | Performed by: UROLOGY

## 2018-07-16 PROCEDURE — 99999 PR PBB SHADOW E&M-EST. PATIENT-LVL III: CPT | Mod: PBBFAC,,, | Performed by: UROLOGY

## 2018-07-16 PROCEDURE — 99204 OFFICE O/P NEW MOD 45 MIN: CPT | Mod: S$GLB,,, | Performed by: UROLOGY

## 2018-07-16 PROCEDURE — 3077F SYST BP >= 140 MM HG: CPT | Mod: CPTII,S$GLB,, | Performed by: UROLOGY

## 2018-07-16 RX ORDER — TAMSULOSIN HYDROCHLORIDE 0.4 MG/1
0.4 CAPSULE ORAL DAILY
Qty: 30 CAPSULE | Refills: 11 | Status: SHIPPED | OUTPATIENT
Start: 2018-07-16 | End: 2018-10-16

## 2018-07-16 RX ORDER — AMOXICILLIN 500 MG
1 CAPSULE ORAL DAILY
COMMUNITY

## 2018-07-16 RX ORDER — SILDENAFIL 50 MG/1
50 TABLET, FILM COATED ORAL DAILY PRN
COMMUNITY

## 2018-07-16 RX ORDER — LACTULOSE 10 G/15ML
10 SOLUTION ORAL; RECTAL 3 TIMES DAILY
COMMUNITY

## 2018-07-16 RX ORDER — TALC
3 POWDER (GRAM) TOPICAL
COMMUNITY

## 2018-07-16 RX ORDER — ACETAMINOPHEN 500 MG
1 TABLET ORAL DAILY
Refills: 3 | COMMUNITY
Start: 2018-06-05

## 2018-07-16 NOTE — LETTER
July 16, 2018      Osiel Reid MD  [address] [place] [address]           [facility]  [address] [facility] [address]  [place] [address]  Phone: [phone]  Fax: [phone]          Patient: Mandeep Guy   MR Number: [id number]   YOB: 1934   Date of Visit: 7/16/2018       Dear Dr. Osiel Reid:    Thank you for referring Mandeep Guy to me for evaluation. Attached you will find relevant portions of my assessment and plan of care.    If you have questions, please do not hesitate to call me. I look forward to following Mandeep Guy along with you.    Sincerely,    Kimber Patel MD    Enclosure  CC:  No Recipients    If you would like to receive this communication electronically, please contact [email or web address] or [phone] to request more information on [employer] access.    For providers and/or their staff who would like to refer a patient to [facility], please contact us through our one-stop-shop provider referral line, [facility], at [phone].    If you feel you have received this communication in error or would no longer like to receive these types of communications, please e-mail [email or web address]

## 2018-07-16 NOTE — PROGRESS NOTES
"Subjective:       Mandeep Guy is a 84 y.o. male who is a new patient who was referred by Hannah Connell for evaluation of scrotal swelling, though he says he is here for urinary frequency.    He reports frequent urination as well as urgency and UUI. He is on no BPH medications. Nocturia x 3-4. Stream is variable. Occasional hesitancy. Denies hematuria, UTI. He reports a procedure many years ago at [facility] - maybe a TURP? He is wearing diapers.     He reported to PCP b/l scrotal swelling. He is s/p b/l lap inguinal hernia repair 10/17 by Dr Ashby (general surgery, not a urologist as listed in PCP notes). Last saw Dr Ashby 11/17 and was doing well. Scrotal swelling fluctuates in size - seems to get bigger as day progresses. Swelling is worse since the hernia surgery. He then reports he had a BOBBY last week - no results available to me.     PVR (bladder scan) today - 0cc      The following portions of the patient's history were reviewed and updated as appropriate: allergies, current medications, past family history, past medical history, past social history, past surgical history and problem list.    Review of Systems  Constitutional: no fever or chills  ENT: no nasal congestion or sore throat  Respiratory: no cough or shortness of breath  Cardiovascular: no chest pain or palpitations  Gastrointestinal: no nausea or vomiting, tolerating diet  Genitourinary: as per HPI  Hematologic/Lymphatic: no easy bruising or lymphadenopathy  Musculoskeletal: no arthralgias or myalgias  Skin: no rashes or lesions  Neurological: no seizures or tremors  Behavioral/Psych: no auditory or visual hallucinations       Objective:    Vitals: BP (!) 164/82   Pulse (!) 58   Resp 15   Ht 5' 8" (1.727 m)   Wt 79.4 kg (175 lb 0.7 oz)   SpO2 98%   BMI 26.62 kg/m²     Physical Exam   General: well developed, well nourished in no acute distress  Head: normocephalic, atraumatic  Neck: supple, trachea midline, no obvious enlargement of " thyroid  HEENT: EOMI, mucus membranes moist, sclera anicteric, no hearing impairment  Lungs: symmetric expansion, non-labored breathing  Cardiovascular: regular rate and rhythm, normal pulses  Abdomen: soft, non tender, non distended, no palpable masses, no hepatosplenomegaly, no hernias, bladder nonpalpable. No CVA tenderness.  Musculoskeletal: no peripheral edema, normal ROM in bilateral upper and lower extremities  Lymphatics: no cervical or inguinal lymphadenopathy  Skin: no rashes or lesions  Neuro: alert and oriented x 3, no gross deficits  Psych: normal judgment and insight, normal mood/affect and non-anxious  Genitourinary:   Penis -  circumcised penis without plaques, lesions, or scarring.  Urethra - orthotopic location without stenosis.  Scrotum  - no lesions or rashes, bilateral scrotal swelling, likely.  Testes - descended bilaterally, symmetric without masses, non tender.  Epididymides - no cysts or lesions, no spermatocele, symmetric   RONDA: patient declined exam      Lab Review   Urine analysis today in clinic shows no urine given     Lab Results   Component Value Date    WBC 11.86 10/16/2017    HGB 11.5 (L) 10/16/2017    HCT 35 (L) 10/16/2017    MCV 93 10/16/2017     10/16/2017     Lab Results   Component Value Date    CREATININE 1.7 (H) 10/17/2017    BUN 23 10/17/2017     No results found for: PSA  No results found for: PSADIAG    Imaging  NA       Assessment/Plan:      1. Frequency of urination    - Flomax   - ?ACh   2. BPH with obstruction/lower urinary tract symptoms    - Flomax   - ?Prior TURP   3. Nocturia    - Flomax   - Reduce PM fluids   4. Urge incontinence    - Flomax   - ?ACh   5. Bilateral hydrocele    - Recommended for BOBBY. Says it was already done.   - Will attempt to get records from [facility]   - He is not inclined for surgical repair   - May be communicating as he notes change in size daily       Follow up in 3 months

## 2018-10-16 ENCOUNTER — OFFICE VISIT (OUTPATIENT)
Dept: UROLOGY | Facility: CLINIC | Age: 83
End: 2018-10-16
Payer: MEDICARE

## 2018-10-16 VITALS
DIASTOLIC BLOOD PRESSURE: 60 MMHG | BODY MASS INDEX: 27.82 KG/M2 | HEART RATE: 68 BPM | HEIGHT: 68 IN | SYSTOLIC BLOOD PRESSURE: 132 MMHG | WEIGHT: 183.56 LBS | RESPIRATION RATE: 14 BRPM

## 2018-10-16 DIAGNOSIS — N39.41 URGE INCONTINENCE: ICD-10-CM

## 2018-10-16 DIAGNOSIS — N40.1 BPH WITH OBSTRUCTION/LOWER URINARY TRACT SYMPTOMS: ICD-10-CM

## 2018-10-16 DIAGNOSIS — R35.0 FREQUENCY OF URINATION: ICD-10-CM

## 2018-10-16 DIAGNOSIS — N13.8 BPH WITH OBSTRUCTION/LOWER URINARY TRACT SYMPTOMS: ICD-10-CM

## 2018-10-16 DIAGNOSIS — N43.3 BILATERAL HYDROCELE: Primary | ICD-10-CM

## 2018-10-16 DIAGNOSIS — R35.1 NOCTURIA: ICD-10-CM

## 2018-10-16 PROCEDURE — 99999 PR PBB SHADOW E&M-EST. PATIENT-LVL III: CPT | Mod: PBBFAC,,, | Performed by: UROLOGY

## 2018-10-16 PROCEDURE — 99213 OFFICE O/P EST LOW 20 MIN: CPT | Mod: S$PBB,,, | Performed by: UROLOGY

## 2018-10-16 PROCEDURE — 1101F PT FALLS ASSESS-DOCD LE1/YR: CPT | Mod: CPTII,,, | Performed by: UROLOGY

## 2018-10-16 PROCEDURE — 3078F DIAST BP <80 MM HG: CPT | Mod: CPTII,,, | Performed by: UROLOGY

## 2018-10-16 PROCEDURE — 3075F SYST BP GE 130 - 139MM HG: CPT | Mod: CPTII,,, | Performed by: UROLOGY

## 2018-10-16 PROCEDURE — 99213 OFFICE O/P EST LOW 20 MIN: CPT | Mod: PBBFAC | Performed by: UROLOGY

## 2018-10-16 RX ORDER — LISINOPRIL 40 MG/1
40 TABLET ORAL DAILY
Refills: 3 | COMMUNITY
Start: 2018-09-28

## 2023-06-25 ENCOUNTER — HOSPITAL ENCOUNTER (EMERGENCY)
Facility: HOSPITAL | Age: 88
Discharge: HOME | End: 2023-06-25
Attending: EMERGENCY MEDICINE
Payer: MEDICARE

## 2023-06-25 VITALS
OXYGEN SATURATION: 99 % | HEIGHT: 68 IN | BODY MASS INDEX: 24.4 KG/M2 | HEART RATE: 53 BPM | RESPIRATION RATE: 18 BRPM | DIASTOLIC BLOOD PRESSURE: 65 MMHG | WEIGHT: 161 LBS | TEMPERATURE: 99 F | SYSTOLIC BLOOD PRESSURE: 115 MMHG
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
## 2023-06-25 DIAGNOSIS — S01.112A LACERATION OF LEFT EYEBROW, INITIAL ENCOUNTER: ICD-10-CM

## 2023-06-25 LAB
ALBUMIN SERPL BCP-MCNC: 3.2 G/DL (ref 3.5–5.2)
ALP SERPL-CCNC: 106 U/L (ref 55–135)
ALT SERPL W/O P-5'-P-CCNC: 11 U/L (ref 10–44)
ANION GAP SERPL CALC-SCNC: 10 MMOL/L (ref 8–16)
AST SERPL-CCNC: 15 U/L (ref 10–40)
BACTERIA #/AREA URNS HPF: NORMAL /HPF
BASOPHILS # BLD AUTO: 0.01 K/UL (ref 0–0.2)
BASOPHILS NFR BLD: 0.2 % (ref 0–1.9)
BILIRUB SERPL-MCNC: 0.4 MG/DL (ref 0.1–1)
BILIRUB UR QL STRIP: NEGATIVE
BNP SERPL-MCNC: 347 PG/ML (ref 0–99)
BUN SERPL-MCNC: 35 MG/DL (ref 8–23)
CALCIUM SERPL-MCNC: 9.5 MG/DL (ref 8.7–10.5)
CHLORIDE SERPL-SCNC: 114 MMOL/L (ref 95–110)
CLARITY UR: CLEAR
CO2 SERPL-SCNC: 18 MMOL/L (ref 23–29)
COLOR UR: YELLOW
CREAT SERPL-MCNC: 2.3 MG/DL (ref 0.5–1.4)
DIFFERENTIAL METHOD: ABNORMAL
EOSINOPHIL # BLD AUTO: 0.2 K/UL (ref 0–0.5)
EOSINOPHIL NFR BLD: 3.3 % (ref 0–8)
ERYTHROCYTE [DISTWIDTH] IN BLOOD BY AUTOMATED COUNT: 15.1 % (ref 11.5–14.5)
EST. GFR  (NO RACE VARIABLE): 26 ML/MIN/1.73 M^2
GLUCOSE SERPL-MCNC: 91 MG/DL (ref 70–110)
GLUCOSE UR QL STRIP: NEGATIVE
HCT VFR BLD AUTO: 35.5 % (ref 40–54)
HGB BLD-MCNC: 11.5 G/DL (ref 14–18)
HGB UR QL STRIP: NEGATIVE
HYALINE CASTS #/AREA URNS LPF: 0 /LPF
IMM GRANULOCYTES # BLD AUTO: 0.03 K/UL (ref 0–0.04)
IMM GRANULOCYTES NFR BLD AUTO: 0.5 % (ref 0–0.5)
KETONES UR QL STRIP: NEGATIVE
LEUKOCYTE ESTERASE UR QL STRIP: ABNORMAL
LYMPHOCYTES # BLD AUTO: 1.5 K/UL (ref 1–4.8)
LYMPHOCYTES NFR BLD: 22.3 % (ref 18–48)
MCH RBC QN AUTO: 30.7 PG (ref 27–31)
MCHC RBC AUTO-ENTMCNC: 32.4 G/DL (ref 32–36)
MCV RBC AUTO: 95 FL (ref 82–98)
MICROSCOPIC COMMENT: NORMAL
MONOCYTES # BLD AUTO: 0.6 K/UL (ref 0.3–1)
MONOCYTES NFR BLD: 8.8 % (ref 4–15)
NEUTROPHILS # BLD AUTO: 4.3 K/UL (ref 1.8–7.7)
NEUTROPHILS NFR BLD: 64.9 % (ref 38–73)
NITRITE UR QL STRIP: NEGATIVE
NRBC BLD-RTO: 0 /100 WBC
PH UR STRIP: 6 [PH] (ref 5–8)
PLATELET # BLD AUTO: 212 K/UL (ref 150–450)
PMV BLD AUTO: 10.9 FL (ref 9.2–12.9)
POTASSIUM SERPL-SCNC: 4.4 MMOL/L (ref 3.5–5.1)
PROT SERPL-MCNC: 6.5 G/DL (ref 6–8.4)
PROT UR QL STRIP: ABNORMAL
RBC # BLD AUTO: 3.74 M/UL (ref 4.6–6.2)
RBC #/AREA URNS HPF: 1 /HPF (ref 0–4)
SODIUM SERPL-SCNC: 142 MMOL/L (ref 136–145)
SP GR UR STRIP: 1.02 (ref 1–1.03)
TROPONIN I SERPL DL<=0.01 NG/ML-MCNC: 0.04 NG/ML (ref 0–0.03)
URN SPEC COLLECT METH UR: ABNORMAL
UROBILINOGEN UR STRIP-ACNC: NEGATIVE EU/DL
WBC # BLD AUTO: 6.58 K/UL (ref 3.9–12.7)
WBC #/AREA URNS HPF: 4 /HPF (ref 0–5)

## 2023-06-25 PROCEDURE — 81000 URINALYSIS NONAUTO W/SCOPE: CPT | Performed by: EMERGENCY MEDICINE

## 2023-06-25 PROCEDURE — 83880 ASSAY OF NATRIURETIC PEPTIDE: CPT | Performed by: EMERGENCY MEDICINE

## 2023-06-25 PROCEDURE — 85025 COMPLETE CBC W/AUTO DIFF WBC: CPT | Performed by: EMERGENCY MEDICINE

## 2023-06-25 PROCEDURE — 99285 EMERGENCY DEPT VISIT HI MDM: CPT | Mod: 25

## 2023-06-25 PROCEDURE — 84484 ASSAY OF TROPONIN QUANT: CPT | Performed by: EMERGENCY MEDICINE

## 2023-06-25 PROCEDURE — 93005 ELECTROCARDIOGRAM TRACING: CPT | Mod: 59

## 2023-06-25 PROCEDURE — 12011 RPR F/E/E/N/L/M 2.5 CM/<: CPT

## 2023-06-25 PROCEDURE — 93010 ELECTROCARDIOGRAM REPORT: CPT | Mod: ,,, | Performed by: INTERNAL MEDICINE

## 2023-06-25 PROCEDURE — 93010 EKG 12-LEAD: ICD-10-PCS | Mod: ,,, | Performed by: INTERNAL MEDICINE

## 2023-06-25 PROCEDURE — 80053 COMPREHEN METABOLIC PANEL: CPT | Performed by: EMERGENCY MEDICINE

## 2023-06-25 NOTE — ED TRIAGE NOTES
"PT presents to ED with report of slip and fall after tripping over shoes. Denies LOC. Approx 1/2" lac above L eyebrow noted, no active bleeding.  "

## 2023-06-25 NOTE — ED PROVIDER NOTES
Encounter Date: 2023    SCRIBE #1 NOTE: I, Alondra Johnson, am scribing for, and in the presence of,  Igor Wellington MD. I have scribed the following portions of the note - Other sections scribed: HPI, ROS.     History     Chief Complaint   Patient presents with    Fall     Patient reports fall today unsure how he fell did not have LOC small controlled bleeding lac to left eyebrow is on blood thinners.      Mandeep Guy is a 89 y.o. male, with a PMHx of CAD, HTN, peripheral artery disease, long-term anticoag use, GERD, cancer, who presents to the ED s/p accidental slip and fall with a small laceration on his right eyebrow. Patient reports he stumbled over some shoes and fell, hitting his R eyebrow. States he has no other pain and had no LOC. Bleeding controlled on eyebrow lac, states he is not feeling much pain there anymore either. No other exacerbating or alleviating factors. Denies feeling dizzy or lightheaded prior to fall. Denies abdominal pain, or other associated symptoms.       The history is provided by the patient. No  was used.   Review of patient's allergies indicates:   Allergen Reactions    Penicillins Rash     Past Medical History:   Diagnosis Date    Anticoagulant long-term use     Cancer     Coronary artery disease     GERD (gastroesophageal reflux disease)     Hypertension     PAD (peripheral artery disease)     s/p stent     Past Surgical History:   Procedure Laterality Date    CHOLECYSTECTOMY      HERNIA REPAIR      stents       No family history on file.  Social History     Tobacco Use    Smoking status: Former     Types: Cigarettes     Quit date: 1995     Years since quittin.5    Smokeless tobacco: Former    Tobacco comments:     pt reports stopped smoking 30 years ago   Substance Use Topics    Alcohol use: No    Drug use: No     Review of Systems   Constitutional: Negative.    Eyes: Negative.    Respiratory: Negative.     Cardiovascular: Negative.     Gastrointestinal: Negative.    Endocrine: Negative.    Genitourinary: Negative.    Musculoskeletal: Negative.    Skin:  Positive for wound (R eyebrow laceration with mild pain).   Psychiatric/Behavioral: Negative.       Physical Exam     Initial Vitals [06/25/23 1626]   BP Pulse Resp Temp SpO2   115/65 (!) 53 18 98.6 °F (37 °C) 99 %      MAP       --         Physical Exam    Nursing note and vitals reviewed.  Constitutional: He appears well-developed and well-nourished. He is not diaphoretic. No distress.   HENT:   Head: Normocephalic.   Nose: Nose normal.   Mouth/Throat: No oropharyngeal exudate.   Small 2 cm superficial abrasion noted to Left eyebrow   Eyes: EOM are normal. Pupils are equal, round, and reactive to light.   Neck: Neck supple. No tracheal deviation present. No JVD present.   Normal range of motion.  Cardiovascular:  Normal rate, regular rhythm, normal heart sounds and intact distal pulses.           Pulmonary/Chest: Breath sounds normal. No respiratory distress. He has no wheezes. He has no rhonchi. He has no rales.   Abdominal: Abdomen is soft. Bowel sounds are normal. He exhibits no distension. There is no abdominal tenderness. There is no rebound and no guarding.   Musculoskeletal:         General: No tenderness or edema. Normal range of motion.      Cervical back: Normal range of motion and neck supple.     Neurological: He is alert and oriented to person, place, and time. He has normal strength.   Skin: Skin is warm and dry. Capillary refill takes less than 2 seconds. No rash noted. No erythema.       ED Course   Lac Repair    Date/Time: 6/25/2023 5:10 PM  Performed by: Igor Wellington MD  Authorized by: Igor Wellington MD     Consent:     Consent obtained:  Verbal    Consent given by:  Patient    Risks discussed:  Infection and pain    Alternatives discussed:  Delayed treatment and no treatment  Universal protocol:     Patient identity confirmed:  Verbally with patient  Anesthesia:      Anesthesia method:  Local infiltration    Local anesthetic:  Lidocaine 1% WITH epi  Laceration details:     Location:  Face    Face location:  L eyebrow    Length (cm):  2  Pre-procedure details:     Preparation:  Patient was prepped and draped in usual sterile fashion  Exploration:     Hemostasis achieved with:  Direct pressure    Wound exploration: wound explored through full range of motion      Contaminated: no    Treatment:     Area cleansed with:  Saline    Amount of cleaning:  Standard    Irrigation solution:  Sterile saline    Irrigation volume:  100    Irrigation method:  Pressure wash    Visualized foreign bodies/material removed: no      Debridement:  None    Undermining:  None    Scar revision: no    Skin repair:     Repair method:  Tissue adhesive  Approximation:     Approximation:  Close  Repair type:     Repair type:  Simple  Post-procedure details:     Dressing:  Antibiotic ointment and adhesive bandage    Procedure completion:  Tolerated well, no immediate complications  Labs Reviewed   CBC W/ AUTO DIFFERENTIAL - Abnormal; Notable for the following components:       Result Value    RBC 3.74 (*)     Hemoglobin 11.5 (*)     Hematocrit 35.5 (*)     RDW 15.1 (*)     All other components within normal limits   COMPREHENSIVE METABOLIC PANEL - Abnormal; Notable for the following components:    Chloride 114 (*)     CO2 18 (*)     BUN 35 (*)     Creatinine 2.3 (*)     Albumin 3.2 (*)     eGFR 26 (*)     All other components within normal limits   TROPONIN I - Abnormal; Notable for the following components:    Troponin I 0.040 (*)     All other components within normal limits   B-TYPE NATRIURETIC PEPTIDE - Abnormal; Notable for the following components:     (*)     All other components within normal limits   URINALYSIS, REFLEX TO URINE CULTURE - Abnormal; Notable for the following components:    Protein, UA 1+ (*)     Leukocytes, UA Trace (*)     All other components within normal limits     Narrative:     Specimen Source->Urine   URINALYSIS MICROSCOPIC    Narrative:     Specimen Source->Urine          Imaging Results              CT Head Without Contrast (Final result)  Result time 06/25/23 17:22:51      Final result by Valeria Steiner MD (06/25/23 17:22:51)                   Impression:      No acute intracranial abnormality detected. Age-indeterminate left corona radiata and right cerebellar hemisphere infarcts.    New complete opacification of bilateral mastoid air cells.    No acute cervical fracture.  Spondylitic changes.      Electronically signed by: Valeria Steiner  Date:    06/25/2023  Time:    17:22               Narrative:    EXAMINATION:  CT OF THE HEAD WITHOUT AND CT CERVICAL SPINE    CLINICAL HISTORY:  Head trauma, minor (Age >= 65y);; Neck trauma (Age >= 65y);    TECHNIQUE:  5 mm unenhanced axial images were obtained from the skull base to the vertex.  1.25 mm axial images were obtained through the cervical spine.    COMPARISON:  12/28/2015    FINDINGS:  CT head: Moderate cerebral atrophy and chronic small vessel ischemic changes are present.  Age-indeterminate infarcts in the left corona radiata and the right cerebellar hemisphere are seen.  There is no acute intracranial hemorrhage, territorial infarct or mass effect, or midline shift. In the visualized paranasal sinuses and mastoid air cells, there is again near complete opacification of bilateral mastoid air cells.  Advanced vascular calcifications are seen involving the supraclinoid portions of the internal carotid arteries and to a lesser extent the vertebral arteries.    CT cervical spine: There is satisfactory alignment of the cervical spine>.  There is no acute fracture or subluxation.  There are significant intervertebral disc space narrowing, facet arthrosis and small marginal osteophytes from the mid to lower cervical spine.  The bones are osteopenic.  Sclerotic foci are seen in the C5 and C7 vertebral bodies.   Degenerative changes are seen at the right TMJ and sternoclavicular joints.  Pannus formation is seen at the odontoid.  Vascular calcifications are seen at the carotid bulbs and aortic arch.                                       CT Cervical Spine Without Contrast (Final result)  Result time 06/25/23 17:22:51      Final result by Valeria Steiner MD (06/25/23 17:22:51)                   Impression:      No acute intracranial abnormality detected. Age-indeterminate left corona radiata and right cerebellar hemisphere infarcts.    New complete opacification of bilateral mastoid air cells.    No acute cervical fracture.  Spondylitic changes.      Electronically signed by: Valeria Steiner  Date:    06/25/2023  Time:    17:22               Narrative:    EXAMINATION:  CT OF THE HEAD WITHOUT AND CT CERVICAL SPINE    CLINICAL HISTORY:  Head trauma, minor (Age >= 65y);; Neck trauma (Age >= 65y);    TECHNIQUE:  5 mm unenhanced axial images were obtained from the skull base to the vertex.  1.25 mm axial images were obtained through the cervical spine.    COMPARISON:  12/28/2015    FINDINGS:  CT head: Moderate cerebral atrophy and chronic small vessel ischemic changes are present.  Age-indeterminate infarcts in the left corona radiata and the right cerebellar hemisphere are seen.  There is no acute intracranial hemorrhage, territorial infarct or mass effect, or midline shift. In the visualized paranasal sinuses and mastoid air cells, there is again near complete opacification of bilateral mastoid air cells.  Advanced vascular calcifications are seen involving the supraclinoid portions of the internal carotid arteries and to a lesser extent the vertebral arteries.    CT cervical spine: There is satisfactory alignment of the cervical spine>.  There is no acute fracture or subluxation.  There are significant intervertebral disc space narrowing, facet arthrosis and small marginal osteophytes from the mid to lower cervical spine.   The bones are osteopenic.  Sclerotic foci are seen in the C5 and C7 vertebral bodies.  Degenerative changes are seen at the right TMJ and sternoclavicular joints.  Pannus formation is seen at the odontoid.  Vascular calcifications are seen at the carotid bulbs and aortic arch.                                       Medications - No data to display  Medical Decision Making:   History:   Old Medical Records: I decided to obtain old medical records.  Clinical Tests:   Lab Tests: Ordered and Reviewed  The following lab test(s) were unremarkable: CBC, BNP, CMP and Troponin  Radiological Study: Ordered and Reviewed       MDM:    89-year-old male with past medical history as noted above presenting after reported trip and fall, mechanical fall.  Physical exam as noted above.  ED workup notable for urinalysis unremarkable, CBC with hemoglobin 11.5, CMP with BUN 35, creatinine 2.3, bicarb 18, troponin 0.040, , CT head showing age-indeterminate cervical spine unremarkable.  EKG shows sinus bradycardia rate of 58 beats per minute, nonspecific ST and T-wave changes noted throughout, some changes when compared to prior EKG from 2017, no STEMI.  Patient presentation consistent with nonsyncopal fall, small laceration repaired as noted above.  Renal function appears similar to baseline, troponin is flat with no current evidence for ACS or further cardiac etiology. At this time given patient's history, physical exam, and ED workup do not suspect CVA/TIA, ICH, HTN emergency, ARF/ALEC, intoxication, MI/ACS, aortic dissection, or any further malignant cause. Discussed diagnosis and further treatment with patient, including f/u.  Return precautions given and all questions answered.  Patient in understanding of plan.  Pt discharged to home improved and stable.         Scribe Attestation:   Scribe #1: I performed the above scribed service and the documentation accurately describes the services I performed. I attest to the accuracy of  the note.                 I, Igor Wellington M.D., personally performed the services described in this documentation. All medical record entries made by the scribe were at my direction and in my presence.  I have reviewed the chart and agree that the record reflects my personal performance and is accurate and complete.    Clinical Impression:   Final diagnoses:  [W19.XXXA] Fall (Primary)  [S01.112A] Laceration of left eyebrow, initial encounter        ED Disposition Condition    Discharge Stable          ED Prescriptions    None       Follow-up Information       Follow up With Specialties Details Why Contact Info    [facility] Emergency Medicine Go to  If symptoms worsen [address]  [facility] [id number] [phone]    Shona Dunbar MD Infectious Diseases Go in 1 week As needed [address] [facility] [address]  [phone]               Igor Wellington MD  06/26/23 1002

## 2024-01-04 DIAGNOSIS — F03.90: Primary | ICD-10-CM
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
## 2024-04-08 ENCOUNTER — OFFICE VISIT (OUTPATIENT)
Dept: NEPHROLOGY | Facility: CLINIC | Age: 89
End: 2024-04-08
Payer: MEDICARE

## 2024-04-08 ENCOUNTER — LAB VISIT (OUTPATIENT)
Dept: LAB | Facility: HOSPITAL | Age: 89
End: 2024-04-08
Payer: MEDICARE

## 2024-04-08 VITALS
WEIGHT: 165.38 LBS | DIASTOLIC BLOOD PRESSURE: 91 MMHG | BODY MASS INDEX: 25.14 KG/M2 | SYSTOLIC BLOOD PRESSURE: 177 MMHG | OXYGEN SATURATION: 97 %

## 2024-04-08 DIAGNOSIS — N18.4 CKD (CHRONIC KIDNEY DISEASE) STAGE 4, GFR 15-29 ML/MIN: Primary | ICD-10-CM

## 2024-04-08 DIAGNOSIS — N18.4 CKD (CHRONIC KIDNEY DISEASE) STAGE 4, GFR 15-29 ML/MIN: ICD-10-CM

## 2024-04-08 DIAGNOSIS — N18.4 ANEMIA IN STAGE 4 CHRONIC KIDNEY DISEASE: ICD-10-CM

## 2024-04-08 DIAGNOSIS — N28.9 RENAL LESION: ICD-10-CM

## 2024-04-08 DIAGNOSIS — D63.1 ANEMIA IN STAGE 4 CHRONIC KIDNEY DISEASE: ICD-10-CM

## 2024-04-08 DIAGNOSIS — R80.9 PROTEINURIA, UNSPECIFIED TYPE: ICD-10-CM

## 2024-04-08 DIAGNOSIS — I10 ESSENTIAL HYPERTENSION: Chronic | ICD-10-CM

## 2024-04-08 LAB
25(OH)D3+25(OH)D2 SERPL-MCNC: 37 NG/ML (ref 30–96)
ALBUMIN SERPL BCP-MCNC: 3 G/DL (ref 3.5–5.2)
ANION GAP SERPL CALC-SCNC: 13 MMOL/L (ref 8–16)
BUN SERPL-MCNC: 27 MG/DL (ref 8–23)
CALCIUM SERPL-MCNC: 9.4 MG/DL (ref 8.7–10.5)
CHLORIDE SERPL-SCNC: 107 MMOL/L (ref 95–110)
CO2 SERPL-SCNC: 21 MMOL/L (ref 23–29)
CREAT SERPL-MCNC: 2.5 MG/DL (ref 0.5–1.4)
ERYTHROCYTE [DISTWIDTH] IN BLOOD BY AUTOMATED COUNT: 14.3 % (ref 11.5–14.5)
EST. GFR  (NO RACE VARIABLE): 23.8 ML/MIN/1.73 M^2
GLUCOSE SERPL-MCNC: 78 MG/DL (ref 70–110)
HCT VFR BLD AUTO: 37.9 % (ref 40–54)
HGB BLD-MCNC: 11.9 G/DL (ref 14–18)
MCH RBC QN AUTO: 29.5 PG (ref 27–31)
MCHC RBC AUTO-ENTMCNC: 31.4 G/DL (ref 32–36)
MCV RBC AUTO: 94 FL (ref 82–98)
PHOSPHATE SERPL-MCNC: 3.9 MG/DL (ref 2.7–4.5)
PLATELET # BLD AUTO: 182 K/UL (ref 150–450)
PMV BLD AUTO: 12.4 FL (ref 9.2–12.9)
POTASSIUM SERPL-SCNC: 4.1 MMOL/L (ref 3.5–5.1)
PTH-INTACT SERPL-MCNC: 577.9 PG/ML (ref 9–77)
RBC # BLD AUTO: 4.04 M/UL (ref 4.6–6.2)
SODIUM SERPL-SCNC: 141 MMOL/L (ref 136–145)
WBC # BLD AUTO: 4.63 K/UL (ref 3.9–12.7)

## 2024-04-08 PROCEDURE — 80069 RENAL FUNCTION PANEL: CPT | Performed by: NURSE PRACTITIONER

## 2024-04-08 PROCEDURE — 85027 COMPLETE CBC AUTOMATED: CPT | Performed by: NURSE PRACTITIONER

## 2024-04-08 PROCEDURE — 83970 ASSAY OF PARATHORMONE: CPT | Performed by: NURSE PRACTITIONER

## 2024-04-08 PROCEDURE — 36415 COLL VENOUS BLD VENIPUNCTURE: CPT | Performed by: NURSE PRACTITIONER

## 2024-04-08 PROCEDURE — 99204 OFFICE O/P NEW MOD 45 MIN: CPT | Mod: S$GLB,,, | Performed by: NURSE PRACTITIONER

## 2024-04-08 PROCEDURE — 82306 VITAMIN D 25 HYDROXY: CPT | Performed by: NURSE PRACTITIONER

## 2024-04-08 PROCEDURE — 99999 PR PBB SHADOW E&M-EST. PATIENT-LVL II: CPT | Mod: PBBFAC,,, | Performed by: NURSE PRACTITIONER

## 2024-04-08 PROCEDURE — 1101F PT FALLS ASSESS-DOCD LE1/YR: CPT | Mod: CPTII,S$GLB,, | Performed by: NURSE PRACTITIONER

## 2024-04-08 PROCEDURE — 3288F FALL RISK ASSESSMENT DOCD: CPT | Mod: CPTII,S$GLB,, | Performed by: NURSE PRACTITIONER

## 2024-04-08 PROCEDURE — 1126F AMNT PAIN NOTED NONE PRSNT: CPT | Mod: CPTII,S$GLB,, | Performed by: NURSE PRACTITIONER

## 2024-04-15 ENCOUNTER — CLINICAL SUPPORT (OUTPATIENT)
Dept: REHABILITATION | Facility: OTHER | Age: 89
End: 2024-04-15
Payer: MEDICARE
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
## 2024-04-15 DIAGNOSIS — R29.6 MULTIPLE FALLS: Primary | ICD-10-CM

## 2024-04-15 DIAGNOSIS — Z74.09 IMPAIRED FUNCTIONAL MOBILITY, BALANCE, GAIT, AND ENDURANCE: ICD-10-CM

## 2024-04-15 DIAGNOSIS — R26.89 BALANCE PROBLEM: ICD-10-CM

## 2024-04-15 PROCEDURE — 97530 THERAPEUTIC ACTIVITIES: CPT | Mod: PN | Performed by: PHYSICAL THERAPIST

## 2024-04-15 PROCEDURE — 97163 PT EVAL HIGH COMPLEX 45 MIN: CPT | Mod: PN | Performed by: PHYSICAL THERAPIST

## 2024-04-15 NOTE — PLAN OF CARE
[facility]   Physical Therapy Initial Evaluation      Name: Mandeep [facility] Number: [id number]    Therapy Diagnosis:   Encounter Diagnoses   Name Primary?    Fall     Multiple falls Yes    Balance problem     Impaired functional mobility, balance, gait, and endurance         Physician: Maria De Jesus Turner NP    Physician Orders: PT Eval and Treat   Medical Diagnosis from Referral: W19.XXXA (ICD-10-CM) - Fall  Evaluation Date: 4/15/2024  Authorization Period Expiration: 3/26/2025  Plan of Care Expiration: 7/12/2024  Progress Note Due: 5/15/2024  Date of Surgery: n/a  Visit # / Visits authorized: 1/ 1   FOTO: 1/ 3    Precautions: Standard, Fall, and cancer     Time In: 1000  Time Out: 1040  Total Billable Time: 10 minutes    Subjective     Date of onset: 1 month    History of current condition - Mandeep reports: he's had 3-4 falls in the past few months. He says he trips himself up, usually leaning too far to the side or back. 1 month ago he was walking on the sidewalk and heard a dog barking and fell to the R when he turned to look. He says he uses a cane for short distances, and RW for longer distances. At home he either uses a cane or no AD. He says he has some difficulty getting in and out of the tub to shower, daughter got him a step stool to enter.     Falls: last 1 month ago    Imaging: none:     Prior Therapy: none for c/c  Social History: Pt lives with their daughter in a SLH with no steps to enter  Occupation: retired  Prior Level of Function: I with ADL's, does not drive   Current Level of Function: mod I with ADL's,  has step stool to get in/out of bathtub to shower (no grab bars, daughter rents so would have to talk to landlord). Cane or RW for ambulation    Pain:  N/a, pt denies pain at this time    Patients goals: reduce falls     Medical History:   Past Medical History:   Diagnosis Date    Anticoagulant long-term use     Cancer     Coronary artery disease     GERD  (gastroesophageal reflux disease)     Hypertension     PAD (peripheral artery disease)     s/p stent       Surgical History:   Mandeep Guy  has a past surgical history that includes stents; Hernia repair; and Cholecystectomy.    Medications:   Mandeep has a current medication list which includes the following prescription(s): amlodipine, aspirin, carvedilol, cholecalciferol (vitamin d3), clopidogrel, ergocalciferol, fish oil-omega-3 fatty acids, hydrochlorothiazide, lactulose, lisinopril, melatonin, oxycodone-acetaminophen, pantoprazole, polyethylene glycol, pravastatin, and sildenafil.    Allergies:   Review of patient's allergies indicates:   Allergen Reactions    Penicillins Rash        Objective      Observation: Pt is alert and oriented, fair historian. Daughter is present to assist with hx and education as needed    Posture: marked forward head/rounded shoulder posture in sitting and standing, can correct with verbal cuing    Gait: RW, slightly increased WENDIE, poor foot clearance B with shuffling noted, forward flexed posture, decreased step length      Lower Extremity Strength  Right LE  Left LE    Ankle dorsiflexion: 5/5 Ankle dorsiflexion: 5/5   Knee extension: 5/5 Knee extension: 5/5   Knee flexion: 4+/5 Knee flexion: 4+/5   Hip flexion: 4+/5 Hip flexion: 4/5   Hip external rotation 4+/5 Hip external rotation 4+/5   Hip internal rotation 4/5 Hip internal rotation 4+/5         Function:    - SLS R: unable to attempt  - SLS L: unable to attempt  - Sit <--> Stand: with B UE use and increased time         Forde Assessment    1. Sitting to Standing   2 - able to stand using hands after several tries  2. Standing Unsupported   3 - able to stand 2 minutes with supervision  3. Sitting Unsupported   4 - able to sit safely and securely 2 minutes  4. Standing to Sitting   3 - controls descent by using hands  5. Pivot Transfer   3 - able to transfer safely with definite use of hands  6. Standing with Eyes Closed   3 -  "able to stand 10 seconds with supervision  7. Standing with Feet Together   2 - able to place feet together independently but unable to hold for 30 seconds  8. Reaching Forward with Outstretched Arm   2 - can reach forward 5 cm/2 inches safely  9. Retrieving Object from Floor   3 - able to pick slipper but needs supervision  10. Turning to Look Behind   3 - looks behind one side only, other side less weight shift  11. Turning 360 Degrees   2 - able to turn 360 safely but slowly  12. Placing Alternate Foot on Step   0 - needs assist to keep from falling/unable to try  13. Standing with One Foot in Front   0 - Looses balance while stepping or standing  14. Standing on One Foot   0 - unable to try or needs assistance to prevent fall  Total: 30  Maximum: 56        Tinetti Gait and Balance Assessment     Assistive Device  Normally Used: Yes. Type of Assistive Device: RW  Assistive Device During Testing: Yes      Balance Tests  1. Sitting Balance:          Steady; safe = 1  2. Arises :        Able, uses arms to help = 1  3. Attempts to arise :        Able, requirese > 1 attempt = 1  4. Immediate standing Balance :        Steady but uses walker or other support = 1  5. Standing balance:         Steady but wide stance (medal heel>4" apart) & uses cane or other support = 1  6. Nudged :        Staggers, grabs, catches self = 1  7. Eyes closed:         Unsteady = 0  8. Turning 360 degrees:         Discontinuous steps = 0 and Steady = 1    9. Sitting Down :         Uses arms or not a smooth motion = 1      Gait Tests:  10. Initiation of Gait:         Any hesitancy or multiple attempts to start = 0  11. Step length and height :        Right swing foot:  Does not pass left stance foot with step = 0 and Does not clear foot completely with step = 0 and Left swing foot:  Does not pass left stance foot with step = 0 and Does not clear foot completely with step = 0  12. Step symmetry         Right & Left step length appear equal = " "1  13. Step continuity :        Steps appear continuous = 1  14. Path :        Mild/moderate deviation or uses walking aid = 1  15. Trunk :          Marked sway or uses walking aid = 0  16. Walking stance width          Heels apart = 0    Balance Score: 8/16  Gait Score:   3/12  Balance + Gait Score: 11/28          Limitation/Restriction for FOTO Balance Survey    Therapist reviewed FOTO scores for Mandeep Guy on 4/15/2024   FOTO documents entered into [employer] - see Media section.    Intake Score: 42%    Goal: 51%         Treatment     Total Treatment time (time-based codes) separate from Evaluation: 10 minutes      Mandeep received the treatments listed below:        therapeutic activities to improve functional performance for 10  minutes, including:  Pt and family education regarding role of physical therapy and therapy plan of care. Development, demonstration, and review of home exercise program to include:   Chin tucks 5" x 20   Scap squeezes 5" x 20    Patient Education and Home Exercises     Education provided:   - therapy rationale and plan of care    Written Home Exercises Provided: yes. Exercises were reviewed and Mandeep was able to demonstrate them prior to the end of the session.  Mandeep demonstrated good  understanding of the education provided. See EMR under Patient Instructions for exercises provided during therapy sessions.    Assessment     Mandeep is a 89 y.o. male referred to outpatient Physical Therapy with a medical diagnosis of W19.XXXA (ICD-10-CM) - Fall. Patient presents with report of multiple falls, most recent 1 month ago. Pt demonstrates high risk for falls with Forde and Tinetti assessments today. No significant strength deficits with MMT as assessed today. Gait with poor step height, step length, increased WENDIE, forward flexed posture with RW.     Patient prognosis is Fair.   Patient will benefit from skilled outpatient Physical Therapy to address the deficits stated above and in the chart " below, provide patient /family education, and to maximize patientt's level of independence.     Plan of care discussed with patient: Yes  Patient's spiritual, cultural and educational needs considered and patient is agreeable to the plan of care and goals as stated below:     Anticipated Barriers for therapy: standard, transportation, hx prostate cancer    Medical Necessity is demonstrated by the following  History  Co-morbidities and personal factors that may impact the plan of care [] LOW: no personal factors / co-morbidities  [] MODERATE: 1-2 personal factors / co-morbidities  [x] HIGH: 3+ personal factors / co-morbidities    Moderate / High Support Documentation:   Co-morbidities affecting plan of care: blood-thinners, prostate CA, CAD, HTN, PAD    Personal Factors:   lifestyle     Examination  Body Structures and Functions, activity limitations and participation restrictions that may impact the plan of care [] LOW: addressing 1-2 elements  [] MODERATE: 3+ elements  [x] HIGH: 4+ elements (please support below)    Moderate / High Support Documentation: multiple falls, transfers, bathing, home and community ambulation      Clinical Presentation [] LOW: stable  [] MODERATE: Evolving  [x] HIGH: Unstable     Decision Making/ Complexity Score: high       Goals:  Short term (4 weeks):  1.Pt to ambulate with RW demonstrating upright posture with minimal cuing on level surface for >150' to improve safe function in home setting  2. Pt to demonstrate improved Forde score to >/= 38 to indicate decreased risk for falls  3. Pt to demonstrate safe technique with sit to stand to improve safe transfers in home and community  4. Pt to have a Tinetti  score of 15 or greater  for decreased risk for falls    Long term (12 weeks)  1. Pt to be independent with home exercise program for improved self management of condition  2. Pt to have decreased subjective report of disability as noted by >51% on FOTO questionnaire   3. Pt to report  no fall anxiety ambulating on level surface using appropriate AD.  4. Pt to ambulate with appropriate AD with safe technique, upright posture, and no LOB for 300' on level surface to improve mobility in community.  5.Tinetti score improved to >/= 46 to indicate improved static and dynamic balance and decreased fall risk to improve safety with home and community ambulation    Plan     Plan of care Certification: 4/15/2024 to 7/12/2024.    Outpatient Physical Therapy 2 times weekly for 12 weeks to include the following interventions: Gait Training, Neuromuscular Re-ed, Patient Education, Therapeutic Activities, and Therapeutic Exercise.     Carmen Pickens, MALENA         Physician's Signature: _________________________________________ Date: ________________

## 2024-04-24 ENCOUNTER — DOCUMENTATION ONLY (OUTPATIENT)
Dept: REHABILITATION | Facility: OTHER | Age: 89
End: 2024-04-24

## 2024-04-24 NOTE — PROGRESS NOTES
Patient was scheduled for a physical therapy treatment appointment at [facility] on 4/24/2024. Patient failed to appear for the appointment without prior notification today.     Olvin Clemente III, PTA

## 2024-05-01 ENCOUNTER — DOCUMENTATION ONLY (OUTPATIENT)
Dept: REHABILITATION | Facility: OTHER | Age: 89
End: 2024-05-01
Payer: MEDICARE

## 2024-05-01 DIAGNOSIS — R29.6 MULTIPLE FALLS: Primary | ICD-10-CM

## 2024-05-01 DIAGNOSIS — R26.89 BALANCE PROBLEM: ICD-10-CM

## 2024-05-01 DIAGNOSIS — Z74.09 IMPAIRED FUNCTIONAL MOBILITY, BALANCE, GAIT, AND ENDURANCE: ICD-10-CM

## 2024-05-01 NOTE — PROGRESS NOTES
Physical Therapy No Show Note       Patient was scheduled for physical therapy at [facility] for 5/1/2024. Pt failed to appear for appointment without prior notification for today, as well as 4/24/24.     Cancel: 0  No Show: 2    Per clinic policy, pt has been removed from schedule due to no shows. Pt will need to contact the clinic for future appointments.        Carmen Pickens, PT

## 2024-05-03 ENCOUNTER — TELEPHONE (OUTPATIENT)
Dept: NEPHROLOGY | Facility: CLINIC | Age: 89
End: 2024-05-03
Payer: MEDICARE

## 2024-05-20 ENCOUNTER — TELEPHONE (OUTPATIENT)
Dept: NEPHROLOGY | Facility: CLINIC | Age: 89
End: 2024-05-20
Payer: MEDICARE

## 2024-05-20 DIAGNOSIS — N18.4 CKD (CHRONIC KIDNEY DISEASE) STAGE 4, GFR 15-29 ML/MIN: Primary | ICD-10-CM

## 2024-05-22 ENCOUNTER — HOSPITAL ENCOUNTER (OUTPATIENT)
Dept: RADIOLOGY | Facility: HOSPITAL | Age: 89
Discharge: HOME | End: 2024-05-22
Attending: NURSE PRACTITIONER
Payer: MEDICARE

## 2024-05-22 ENCOUNTER — TELEPHONE (OUTPATIENT)
Dept: NEPHROLOGY | Facility: CLINIC | Age: 89
End: 2024-05-22
Payer: MEDICARE

## 2024-05-22 DIAGNOSIS — N18.4 CKD (CHRONIC KIDNEY DISEASE) STAGE 4, GFR 15-29 ML/MIN: ICD-10-CM

## 2024-05-22 PROCEDURE — 76770 US EXAM ABDO BACK WALL COMP: CPT | Mod: TC

## 2024-05-22 PROCEDURE — 76770 US EXAM ABDO BACK WALL COMP: CPT | Mod: 26,,, | Performed by: RADIOLOGY

## 2024-05-22 NOTE — TELEPHONE ENCOUNTER
Called pt on all numbers listed no answer l/m     ----- Message from Juliann Matthew NP sent at 5/22/2024  3:08 PM CDT -----  Pls call pt. His kidney function is a bit worse.  Ask how much water he's been drinking and if he's been having worse diarrhea. If yes, please ask him to hydrate better.    Thanks,  Juliann

## 2024-05-24 ENCOUNTER — TELEPHONE (OUTPATIENT)
Dept: NEPHROLOGY | Facility: CLINIC | Age: 89
End: 2024-05-24
Payer: MEDICARE

## 2024-07-01 ENCOUNTER — TELEPHONE (OUTPATIENT)
Dept: NEPHROLOGY | Facility: CLINIC | Age: 89
End: 2024-07-01
Payer: MEDICARE

## 2024-10-09 ENCOUNTER — TELEPHONE (OUTPATIENT)
Dept: NEPHROLOGY | Facility: CLINIC | Age: 89
End: 2024-10-09
Payer: MEDICARE

## 2024-10-28 DIAGNOSIS — R93.89 ABNORMAL FINDINGS ON DIAGNOSTIC IMAGING OF OTHER SPECIFIED BODY STRUCTURES: Primary | ICD-10-CM

## 2025-04-17 NOTE — SUBJECTIVE & OBJECTIVE
Review of Systems   Constitutional: Negative for appetite change and fever.   HENT: Negative for congestion and rhinorrhea.    Eyes: Negative for photophobia and visual disturbance.   Respiratory: Negative for cough and shortness of breath.    Cardiovascular: Negative for chest pain and leg swelling.   Gastrointestinal: Negative for abdominal pain, nausea and vomiting.   Musculoskeletal: Negative for gait problem and joint swelling.   Skin: Negative for pallor and rash.   Neurological: Negative for weakness and numbness.   Psychiatric/Behavioral: Negative for confusion and dysphoric mood.     Objective:     Vital Signs (Most Recent):  Temp: 98.4 °F (36.9 °C) (08/30/17 1152)  Pulse: 69 (08/30/17 1152)  Resp: 18 (08/30/17 1152)  BP: (!) 149/64 (08/30/17 1152)  SpO2: 100 % (08/30/17 1152) Vital Signs (24h Range):  Temp:  [97.3 °F (36.3 °C)-98.8 °F (37.1 °C)] 98.4 °F (36.9 °C)  Pulse:  [68-89] 69  Resp:  [18-28] 18  SpO2:  [97 %-100 %] 100 %  BP: (142-203)/() 149/64     Weight: 80.8 kg (178 lb 3.2 oz)  Body mass index is 27.1 kg/m².    Physical Exam   Constitutional: He is oriented to person, place, and time. He appears well-developed and well-nourished. No distress.   HENT:   Right Ear: External ear normal.   Left Ear: External ear normal.   Nose: Nose normal.   Eyes: EOM are normal. Pupils are equal, round, and reactive to light. No scleral icterus.   Neck: Normal range of motion. Neck supple. No thyromegaly present.   Cardiovascular: Normal rate and normal heart sounds.  Exam reveals no friction rub.    No murmur heard.  Pulmonary/Chest: Effort normal and breath sounds normal. No respiratory distress. He has no wheezes. He has no rales.   Abdominal: Soft. Bowel sounds are normal. He exhibits no distension and no mass. There is no tenderness.   Musculoskeletal: Normal range of motion. He exhibits no edema or deformity.   Neurological: He is alert and oriented to person, place, and time. No cranial nerve deficit.  Spoke to patient, informed per Dr. Alberto, order to general surgeon entered.   Dr. Conor Dumont- General Surgery  [address] [place] [id number]  Phone [phone]  Fax [phone]     Patient to call and schedule an appointment. Patient agrees and acknowledged an oral understanding.      Skin: Skin is warm and dry. No pallor.   Psychiatric: He has a normal mood and affect. His behavior is normal. Thought content normal.        Significant Labs: All pertinent labs within the past 24 hours have been reviewed.    Significant Imaging: I have reviewed all pertinent imaging results/findings within the past 24 hours.

## 2025-06-12 NOTE — PROGRESS NOTES
"Subjective:       Patient ID: Mandeep Guy is a 89 y.o.  male who presents for new evaluation of of renal dysfunction.    HPI     Mandeep Guy is a 89 year old male with HTN, CAD, PAD, DANIELLE, BPH, hydrocele, h/o biliary pancreatitis, h/o cholecystectomy that presents to clinic today with daughter for evaluation of CKD. Admission in January 2023 for obstructive uropathy and ALEC (sCr ~2 at peak improving to 1.5 at discharge). Noted rise in June 2023 to 2.3-->2.48 per labs in October 2023. No recent labs since. He reports history of HTN for many years. He stopped smoking cigarettes 2 years ago ( h/o 1 pack per day).   He was seen by Dr. Labadie  with [facility] for renal lesion and enlarged prostate,     "HPI: Patient with history of indeterminate renal lesion on noncontrast CT. Today with discuss the results of his recent contrast CT scan-renal protocol. The CT scan revealed a 1.8 cm complex renal cyst with internal enhancement suspicious for a renal cell carcinoma in the lower pole of the right kidney. Findings discussed with the patient in person and the daughter over the phone. We discussed the options of observation since the lesion is less than 3 cm, IR percutaneous ablation and partial nephrectomy. They have opted for observation.  Patient with history of an elevated PSA. MRI of the prostate revealed a 25 cc prostate with a single PI rads three lesion. We discussed the risk and benefits of a prostate biopsy at age 89. They declined a biopsy at this time."    BP is elevated today. Has been better controlled at appointments. Reports taking amlodipine 10, lasix 40 PRN, valsartan 160. The patient denies taking NSAIDs, herbal supplements, or new antibiotics, recreational drugs, recent episode of dehydration, diarrhea, nausea or vomiting, acute illness, hospitalization or exposure to IV radiocontrast.     Reports drinking about 1-2, 16 oz bottles of water per day. HE reports loose bowels (4-6 BMs per day), which " is not new for him.     Significant family hx includes: Son s/p nephrectomy (unclear etiology)    Last renal US: 1/20/23 , reviewed.  Indication: N28.1   Bilateral renal cysts. Indeterminate lesion seen on recent CT.   Prior study reviewed:  Abdominal CT January 9, 2023    Procedure: Real time sonography was performed on the kidneys and bladder.   Right kidney: Size:  10.8 x 4.2.  In the midpole of right kidney, there is a complex ill-defined rounded lesion measuring 1.9 cm which is partially cystic.  There is no stone or hydronephrosis.   Left Kidney: Size: 9.0 x  4.3.  There is a 2 cm simple cyst in the midpole of the left kidney.  On the prior exam, there is a hyperdense lesion on the lower pole.  On ultrasound, no discrete mass or cyst is seen in the lower pole.  There is no stone or hydronephrosis.       Review of Systems   Constitutional:  Positive for appetite change (reduced) and unexpected weight change. Negative for fever.   Respiratory:  Negative for shortness of breath.    Cardiovascular:  Positive for leg swelling. Negative for chest pain.   Gastrointestinal:  Negative for diarrhea, nausea and vomiting.        Loose bowels   Genitourinary:  Negative for difficulty urinating, dysuria and hematuria.       Objective:       Blood pressure (!) 177/91, weight 75 kg (165 lb 5.5 oz), SpO2 97 %.  Physical Exam  Vitals reviewed.   Constitutional:       General: He is not in acute distress.  HENT:      Head: Normocephalic and atraumatic.   Eyes:      General: No scleral icterus.  Cardiovascular:      Rate and Rhythm: Normal rate and regular rhythm.   Pulmonary:      Effort: Pulmonary effort is normal. No respiratory distress.      Breath sounds: No wheezing or rales.   Musculoskeletal:      Right lower leg: Edema present.      Left lower leg: Edema present.      Comments: Mild, R>L   Skin:     General: Skin is warm and dry.   Neurological:      Mental Status: He is alert.      Comments: Alert, answers questions  "appropriately, speech clear   Psychiatric:         Mood and Affect: Mood normal.         Behavior: Behavior normal.           Lab Results   Component Value Date    CREATININE 2.3 (H) 06/25/2023     No results found for: "UTPCR"  Lab Results   Component Value Date     06/25/2023    K 4.4 06/25/2023    CO2 18 (L) 06/25/2023     (H) 06/25/2023     Lab Results   Component Value Date    CALCIUM 9.5 06/25/2023    PHOS 2.5 (L) 09/24/2017     Lab Results   Component Value Date    HGB 11.5 (L) 06/25/2023    WBC 6.58 06/25/2023    HCT 35.5 (L) 06/25/2023      Lab Results   Component Value Date    HGBA1C 5.8 12/27/2015     06/25/2023    BUN 35 (H) 06/25/2023     No results found for: "LDLCALC"      Assessment:       1. CKD (chronic kidney disease) stage 4, GFR 15-29 ml/min    2. Proteinuria, unspecified type    3. Essential hypertension    4. Renal lesion    5. Anemia in stage 4 chronic kidney disease        Plan:   CKD stage IV c eGFR 26 mL/min -  - Baseline sCr ~1.5-1.7--> worsening to 2.3-2.48 on labs over the last year, suspect progression.  - CKD in setting of h/o obstructive uropathy? (peak sCr 2.0), longstanding HTN, tobacco abuse, vascular disease  - Reports frequent loose bowels--> possibly related to post cholecystectomy. Encouraged increased hydration and f/u with PCP vs GI.  - Avoid NSAIDs  - Repeat renal US    UPCR 1+ on last UA. Check UPCR. Continue ARB.    Acid-base Bicarb low, repeat. Maintained on sodium bicarb per primary.    Secondary hyperparathyroidism Ca and phos okay. Monitor PTH and Vit D.    Anemia Hgb at CKD goal   DM No history   Lipid Management On statin   ESRD planning Provided education about the stages of CKD, usual progression, and need to monitor for and treat CKD-related disease in an effort to delay progression of CKD.        HTN - Elevated today. Has been more controlled at previous appointments.  - Continue amlodipine, valsartan, lasix PRN.  - Monitor for now. Possible " up titration in the future if remains above goal.     Renal lesion - observation per Urology. Repeat US.     All questions patient had were answered.  Asked if further questions. None. F/u in clinic 8 weeks  with labs and urine prior to next visit or sooner if needed.  ER for emergency concerns.    Summary of Plan:  - RFP, CBC, UA, UPCR, PTH, Vit D today  - repeat renal US  - Encouraged increased hydration  - Monitor BP  - RTC 8 weeks with labs        TBA

## (undated) DEVICE — NDL 18GA X1 1/2 REG BEVEL

## (undated) DEVICE — CHLORAPREP W TINT 26ML APPL

## (undated) DEVICE — GLOVE SURG BIOGEL LATEX SZ 7.5

## (undated) DEVICE — TRAY FOLEY 16FR INFECTION CONT

## (undated) DEVICE — POSITIONER HEAD DONUT 9IN FOAM

## (undated) DEVICE — TROCAR ENDOPATH XCEL 11MM 10CM

## (undated) DEVICE — SEAL SCOPE WARMER 20/BX

## (undated) DEVICE — TUBING INSUFFLATION 10

## (undated) DEVICE — APPLIER CLIP ENDO LIGAMAX 5MM

## (undated) DEVICE — SEE MEDLINE ITEM 157117

## (undated) DEVICE — SOL SOD CHLORIDE 0.9% 10ML

## (undated) DEVICE — DISSECTOR BALLON ROUND BIT

## (undated) DEVICE — Device

## (undated) DEVICE — NDL HYPO REG 25G X 1 1/2

## (undated) DEVICE — SCISSOR CURVED ENDOPATH 5MM

## (undated) DEVICE — SEE MEDLINE ITEM 152622

## (undated) DEVICE — ADHESIVE DERMABOND ADVANCED

## (undated) DEVICE — SEE L#120831

## (undated) DEVICE — PACK ENDOSCOPY GENERAL

## (undated) DEVICE — ELECTRODE REM PLYHSV RETURN 9

## (undated) DEVICE — CLIPPER BLADE MOD 4406 (CAREF)

## (undated) DEVICE — SYR 10CC LUER LOCK

## (undated) DEVICE — KIT ANTIFOG

## (undated) DEVICE — BAG TISS RETRV MONARCH 10MM

## (undated) DEVICE — SOL NS 1000CC

## (undated) DEVICE — IRRIGATOR ENDOSCOPY DISP.

## (undated) DEVICE — SUT 0 VICRYL / UR6 (J603)

## (undated) DEVICE — BLADE SURG CARBON STEEL SZ11

## (undated) DEVICE — HEMOSTAT SURGICEL 4X8IN

## (undated) DEVICE — SUT MONOCRYL 4-0 PS-2

## (undated) DEVICE — DEVICE FIXATION ABSORBATACK 5M

## (undated) DEVICE — BLANKET UPPER BODY 78.7X29.9IN

## (undated) DEVICE — SUT VCRL ENDOLOOP 0 18 VIOL

## (undated) DEVICE — SUPPORT ULNA NERVE PROTECTOR

## (undated) DEVICE — NDL INSUF ULTRA VERESS 120MM

## (undated) DEVICE — TROCAR ENDOPATH XCEL 5X100MM

## (undated) DEVICE — CANISTER SUCTION 2 LTR